# Patient Record
Sex: MALE | Race: WHITE | NOT HISPANIC OR LATINO | Employment: OTHER | URBAN - METROPOLITAN AREA
[De-identification: names, ages, dates, MRNs, and addresses within clinical notes are randomized per-mention and may not be internally consistent; named-entity substitution may affect disease eponyms.]

---

## 2022-05-30 ENCOUNTER — ANESTHESIA EVENT (OUTPATIENT)
Dept: ENDOSCOPY | Facility: HOSPITAL | Age: 87
DRG: 378 | End: 2022-05-30
Payer: MEDICARE

## 2022-05-30 ENCOUNTER — HOSPITAL ENCOUNTER (EMERGENCY)
Facility: HOSPITAL | Age: 87
Discharge: ANOTHER HOSPITAL | End: 2022-05-30
Attending: STUDENT IN AN ORGANIZED HEALTH CARE EDUCATION/TRAINING PROGRAM
Payer: MEDICARE

## 2022-05-30 ENCOUNTER — HOSPITAL ENCOUNTER (INPATIENT)
Facility: HOSPITAL | Age: 87
LOS: 4 days | Discharge: HOME HEALTH | DRG: 378 | End: 2022-06-03
Attending: INTERNAL MEDICINE | Admitting: INTERNAL MEDICINE
Payer: MEDICARE

## 2022-05-30 VITALS
WEIGHT: 190 LBS | HEIGHT: 70 IN | RESPIRATION RATE: 20 BRPM | HEART RATE: 85 BPM | OXYGEN SATURATION: 99 % | SYSTOLIC BLOOD PRESSURE: 118 MMHG | DIASTOLIC BLOOD PRESSURE: 71 MMHG | BODY MASS INDEX: 27.2 KG/M2 | TEMPERATURE: 98 F

## 2022-05-30 DIAGNOSIS — K92.2 UGIB (UPPER GASTROINTESTINAL BLEED): Primary | ICD-10-CM

## 2022-05-30 DIAGNOSIS — R00.0 TACHYCARDIA, UNSPECIFIED: ICD-10-CM

## 2022-05-30 DIAGNOSIS — D62 ACUTE BLOOD LOSS ANEMIA: ICD-10-CM

## 2022-05-30 DIAGNOSIS — K92.2 ACUTE GI BLEEDING: Primary | ICD-10-CM

## 2022-05-30 DIAGNOSIS — R55 SYNCOPE: ICD-10-CM

## 2022-05-30 DIAGNOSIS — K92.2 GI BLEED: ICD-10-CM

## 2022-05-30 DIAGNOSIS — R11.10 VOMITING: ICD-10-CM

## 2022-05-30 DIAGNOSIS — R00.0 TACHYCARDIA: ICD-10-CM

## 2022-05-30 PROBLEM — I10 HTN (HYPERTENSION), BENIGN: Status: ACTIVE | Noted: 2022-05-30

## 2022-05-30 LAB
ABO + RH BLD: NORMAL
ABO + RH BLD: NORMAL
ALBUMIN SERPL BCP-MCNC: 2.4 G/DL (ref 3.5–5.2)
ALP SERPL-CCNC: 84 U/L (ref 55–135)
ALT SERPL W/O P-5'-P-CCNC: 13 U/L (ref 10–44)
ANION GAP SERPL CALC-SCNC: 12 MMOL/L (ref 8–16)
APTT BLDCRRT: <21 SEC (ref 21–32)
AST SERPL-CCNC: 11 U/L (ref 10–40)
BASOPHILS # BLD AUTO: 0.05 K/UL (ref 0–0.2)
BASOPHILS # BLD AUTO: 0.06 K/UL (ref 0–0.2)
BASOPHILS NFR BLD: 0.5 % (ref 0–1.9)
BASOPHILS NFR BLD: 0.5 % (ref 0–1.9)
BILIRUB SERPL-MCNC: 0.9 MG/DL (ref 0.1–1)
BLD GP AB SCN CELLS X3 SERPL QL: NORMAL
BLD GP AB SCN CELLS X3 SERPL QL: NORMAL
BLD PROD TYP BPU: NORMAL
BLD PROD TYP BPU: NORMAL
BLOOD UNIT EXPIRATION DATE: NORMAL
BLOOD UNIT EXPIRATION DATE: NORMAL
BLOOD UNIT TYPE CODE: 6200
BLOOD UNIT TYPE CODE: 6200
BLOOD UNIT TYPE: NORMAL
BLOOD UNIT TYPE: NORMAL
BUN SERPL-MCNC: 42 MG/DL (ref 8–23)
CALCIUM SERPL-MCNC: 7.3 MG/DL (ref 8.7–10.5)
CHLORIDE SERPL-SCNC: 111 MMOL/L (ref 95–110)
CO2 SERPL-SCNC: 20 MMOL/L (ref 23–29)
CODING SYSTEM: NORMAL
CODING SYSTEM: NORMAL
CREAT SERPL-MCNC: 1.3 MG/DL (ref 0.5–1.4)
CTP QC/QA: YES
DIFFERENTIAL METHOD: ABNORMAL
DIFFERENTIAL METHOD: ABNORMAL
DISPENSE STATUS: NORMAL
DISPENSE STATUS: NORMAL
EOSINOPHIL # BLD AUTO: 0 K/UL (ref 0–0.5)
EOSINOPHIL # BLD AUTO: 0.1 K/UL (ref 0–0.5)
EOSINOPHIL NFR BLD: 0.4 % (ref 0–8)
EOSINOPHIL NFR BLD: 0.5 % (ref 0–8)
ERYTHROCYTE [DISTWIDTH] IN BLOOD BY AUTOMATED COUNT: 17.7 % (ref 11.5–14.5)
ERYTHROCYTE [DISTWIDTH] IN BLOOD BY AUTOMATED COUNT: 18.4 % (ref 11.5–14.5)
EST. GFR  (AFRICAN AMERICAN): 57.9 ML/MIN/1.73 M^2
EST. GFR  (NON AFRICAN AMERICAN): 50.1 ML/MIN/1.73 M^2
GLUCOSE SERPL-MCNC: 179 MG/DL (ref 70–110)
HCT VFR BLD AUTO: 23.2 % (ref 40–54)
HCT VFR BLD AUTO: 29.6 % (ref 40–54)
HGB BLD-MCNC: 6.4 G/DL (ref 14–18)
HGB BLD-MCNC: 8.8 G/DL (ref 14–18)
IMM GRANULOCYTES # BLD AUTO: 0.04 K/UL (ref 0–0.04)
IMM GRANULOCYTES # BLD AUTO: 0.05 K/UL (ref 0–0.04)
IMM GRANULOCYTES NFR BLD AUTO: 0.4 % (ref 0–0.5)
IMM GRANULOCYTES NFR BLD AUTO: 0.4 % (ref 0–0.5)
INR PPP: 1.1 (ref 0.8–1.2)
LYMPHOCYTES # BLD AUTO: 0.8 K/UL (ref 1–4.8)
LYMPHOCYTES # BLD AUTO: 1.2 K/UL (ref 1–4.8)
LYMPHOCYTES NFR BLD: 10.3 % (ref 18–48)
LYMPHOCYTES NFR BLD: 9 % (ref 18–48)
MCH RBC QN AUTO: 21.3 PG (ref 27–31)
MCH RBC QN AUTO: 23.8 PG (ref 27–31)
MCHC RBC AUTO-ENTMCNC: 27.6 G/DL (ref 32–36)
MCHC RBC AUTO-ENTMCNC: 29.7 G/DL (ref 32–36)
MCV RBC AUTO: 77 FL (ref 82–98)
MCV RBC AUTO: 80 FL (ref 82–98)
MONOCYTES # BLD AUTO: 0.3 K/UL (ref 0.3–1)
MONOCYTES # BLD AUTO: 0.6 K/UL (ref 0.3–1)
MONOCYTES NFR BLD: 3.3 % (ref 4–15)
MONOCYTES NFR BLD: 4.9 % (ref 4–15)
NEUTROPHILS # BLD AUTO: 7.9 K/UL (ref 1.8–7.7)
NEUTROPHILS # BLD AUTO: 9.5 K/UL (ref 1.8–7.7)
NEUTROPHILS NFR BLD: 83.4 % (ref 38–73)
NEUTROPHILS NFR BLD: 86.4 % (ref 38–73)
NRBC BLD-RTO: 0 /100 WBC
NRBC BLD-RTO: 0 /100 WBC
NT-PROBNP SERPL-MCNC: 106 PG/ML (ref 5–1800)
NUM UNITS TRANS PACKED RBC: NORMAL
NUM UNITS TRANS PACKED RBC: NORMAL
OB PNL STL: POSITIVE
PLATELET # BLD AUTO: 268 K/UL (ref 150–450)
PLATELET # BLD AUTO: 317 K/UL (ref 150–450)
PMV BLD AUTO: 10.1 FL (ref 9.2–12.9)
PMV BLD AUTO: 9.9 FL (ref 9.2–12.9)
POTASSIUM SERPL-SCNC: 4.5 MMOL/L (ref 3.5–5.1)
PROT SERPL-MCNC: 5.3 G/DL (ref 6–8.4)
PROTHROMBIN TIME: 11.5 SEC (ref 9–12.5)
RBC # BLD AUTO: 3.01 M/UL (ref 4.6–6.2)
RBC # BLD AUTO: 3.69 M/UL (ref 4.6–6.2)
SARS-COV-2 RDRP RESP QL NAA+PROBE: NEGATIVE
SODIUM SERPL-SCNC: 143 MMOL/L (ref 136–145)
TROPONIN I SERPL DL<=0.01 NG/ML-MCNC: 8.6 PG/ML (ref 0–60)
WBC # BLD AUTO: 11.41 K/UL (ref 3.9–12.7)
WBC # BLD AUTO: 9.11 K/UL (ref 3.9–12.7)

## 2022-05-30 PROCEDURE — 99291 CRITICAL CARE FIRST HOUR: CPT | Mod: 25

## 2022-05-30 PROCEDURE — 93010 EKG 12-LEAD: ICD-10-PCS | Mod: ,,, | Performed by: INTERNAL MEDICINE

## 2022-05-30 PROCEDURE — C9113 INJ PANTOPRAZOLE SODIUM, VIA: HCPCS | Performed by: INTERNAL MEDICINE

## 2022-05-30 PROCEDURE — 96374 THER/PROPH/DIAG INJ IV PUSH: CPT

## 2022-05-30 PROCEDURE — 93005 ELECTROCARDIOGRAM TRACING: CPT

## 2022-05-30 PROCEDURE — 99222 1ST HOSP IP/OBS MODERATE 55: CPT | Mod: ,,, | Performed by: INTERNAL MEDICINE

## 2022-05-30 PROCEDURE — 93010 ELECTROCARDIOGRAM REPORT: CPT | Mod: ,,, | Performed by: INTERNAL MEDICINE

## 2022-05-30 PROCEDURE — 86901 BLOOD TYPING SEROLOGIC RH(D): CPT | Performed by: STUDENT IN AN ORGANIZED HEALTH CARE EDUCATION/TRAINING PROGRAM

## 2022-05-30 PROCEDURE — 86850 RBC ANTIBODY SCREEN: CPT | Mod: 91 | Performed by: INTERNAL MEDICINE

## 2022-05-30 PROCEDURE — 85730 THROMBOPLASTIN TIME PARTIAL: CPT | Performed by: STUDENT IN AN ORGANIZED HEALTH CARE EDUCATION/TRAINING PROGRAM

## 2022-05-30 PROCEDURE — P9016 RBC LEUKOCYTES REDUCED: HCPCS | Performed by: STUDENT IN AN ORGANIZED HEALTH CARE EDUCATION/TRAINING PROGRAM

## 2022-05-30 PROCEDURE — 85025 COMPLETE CBC W/AUTO DIFF WBC: CPT | Performed by: STUDENT IN AN ORGANIZED HEALTH CARE EDUCATION/TRAINING PROGRAM

## 2022-05-30 PROCEDURE — 83880 ASSAY OF NATRIURETIC PEPTIDE: CPT | Performed by: STUDENT IN AN ORGANIZED HEALTH CARE EDUCATION/TRAINING PROGRAM

## 2022-05-30 PROCEDURE — C9113 INJ PANTOPRAZOLE SODIUM, VIA: HCPCS | Performed by: STUDENT IN AN ORGANIZED HEALTH CARE EDUCATION/TRAINING PROGRAM

## 2022-05-30 PROCEDURE — 63600175 PHARM REV CODE 636 W HCPCS: Performed by: STUDENT IN AN ORGANIZED HEALTH CARE EDUCATION/TRAINING PROGRAM

## 2022-05-30 PROCEDURE — U0002 COVID-19 LAB TEST NON-CDC: HCPCS | Performed by: STUDENT IN AN ORGANIZED HEALTH CARE EDUCATION/TRAINING PROGRAM

## 2022-05-30 PROCEDURE — 99222 PR INITIAL HOSPITAL CARE,LEVL II: ICD-10-PCS | Mod: ,,, | Performed by: INTERNAL MEDICINE

## 2022-05-30 PROCEDURE — 96361 HYDRATE IV INFUSION ADD-ON: CPT

## 2022-05-30 PROCEDURE — 82272 OCCULT BLD FECES 1-3 TESTS: CPT | Performed by: STUDENT IN AN ORGANIZED HEALTH CARE EDUCATION/TRAINING PROGRAM

## 2022-05-30 PROCEDURE — 80053 COMPREHEN METABOLIC PANEL: CPT | Performed by: STUDENT IN AN ORGANIZED HEALTH CARE EDUCATION/TRAINING PROGRAM

## 2022-05-30 PROCEDURE — 25000003 PHARM REV CODE 250: Performed by: INTERNAL MEDICINE

## 2022-05-30 PROCEDURE — 86920 COMPATIBILITY TEST SPIN: CPT | Performed by: INTERNAL MEDICINE

## 2022-05-30 PROCEDURE — 85610 PROTHROMBIN TIME: CPT | Performed by: STUDENT IN AN ORGANIZED HEALTH CARE EDUCATION/TRAINING PROGRAM

## 2022-05-30 PROCEDURE — 36415 COLL VENOUS BLD VENIPUNCTURE: CPT | Performed by: INTERNAL MEDICINE

## 2022-05-30 PROCEDURE — 85025 COMPLETE CBC W/AUTO DIFF WBC: CPT | Mod: 91 | Performed by: INTERNAL MEDICINE

## 2022-05-30 PROCEDURE — 84484 ASSAY OF TROPONIN QUANT: CPT | Performed by: STUDENT IN AN ORGANIZED HEALTH CARE EDUCATION/TRAINING PROGRAM

## 2022-05-30 PROCEDURE — 86920 COMPATIBILITY TEST SPIN: CPT | Performed by: STUDENT IN AN ORGANIZED HEALTH CARE EDUCATION/TRAINING PROGRAM

## 2022-05-30 PROCEDURE — 21400001 HC TELEMETRY ROOM

## 2022-05-30 PROCEDURE — 63600175 PHARM REV CODE 636 W HCPCS: Performed by: INTERNAL MEDICINE

## 2022-05-30 PROCEDURE — 36430 TRANSFUSION BLD/BLD COMPNT: CPT

## 2022-05-30 RX ORDER — PANTOPRAZOLE SODIUM 40 MG/10ML
80 INJECTION, POWDER, LYOPHILIZED, FOR SOLUTION INTRAVENOUS
Status: COMPLETED | OUTPATIENT
Start: 2022-05-30 | End: 2022-05-30

## 2022-05-30 RX ORDER — PANTOPRAZOLE SODIUM 40 MG/10ML
40 INJECTION, POWDER, LYOPHILIZED, FOR SOLUTION INTRAVENOUS 2 TIMES DAILY
Status: DISCONTINUED | OUTPATIENT
Start: 2022-05-30 | End: 2022-05-30

## 2022-05-30 RX ORDER — HYDROCODONE BITARTRATE AND ACETAMINOPHEN 500; 5 MG/1; MG/1
TABLET ORAL
Status: DISCONTINUED | OUTPATIENT
Start: 2022-05-30 | End: 2022-05-30 | Stop reason: HOSPADM

## 2022-05-30 RX ORDER — LORAZEPAM 2 MG/ML
1 INJECTION INTRAMUSCULAR ONCE
Status: COMPLETED | OUTPATIENT
Start: 2022-05-31 | End: 2022-05-30

## 2022-05-30 RX ORDER — SODIUM CHLORIDE 9 MG/ML
INJECTION, SOLUTION INTRAVENOUS CONTINUOUS
Status: DISCONTINUED | OUTPATIENT
Start: 2022-05-30 | End: 2022-06-01

## 2022-05-30 RX ADMIN — LORAZEPAM 1 MG: 2 INJECTION INTRAMUSCULAR; INTRAVENOUS at 11:05

## 2022-05-30 RX ADMIN — PANTOPRAZOLE SODIUM 80 MG: 40 INJECTION, POWDER, FOR SOLUTION INTRAVENOUS at 06:05

## 2022-05-30 RX ADMIN — SODIUM CHLORIDE, SODIUM LACTATE, POTASSIUM CHLORIDE, AND CALCIUM CHLORIDE 500 ML: .6; .31; .03; .02 INJECTION, SOLUTION INTRAVENOUS at 06:05

## 2022-05-30 RX ADMIN — PANTOPRAZOLE SODIUM 8 MG/HR: 40 INJECTION, POWDER, FOR SOLUTION INTRAVENOUS at 08:05

## 2022-05-30 RX ADMIN — SODIUM CHLORIDE: 0.9 INJECTION, SOLUTION INTRAVENOUS at 07:05

## 2022-05-30 RX ADMIN — PANTOPRAZOLE SODIUM 8 MG/HR: 40 INJECTION, POWDER, FOR SOLUTION INTRAVENOUS at 11:05

## 2022-05-30 NOTE — HPI
Mr. Duran is a 85 yo M who presents with a CC of vomiting black material for 2 days.  Patient presented to an ED in [place]- and was transferred here for evaluation of a likely upper GI bleed.  Patient found to have a Hgb of 6.4.  Patient takes Alkaseltser several times a week. No abdominal pain. No history of GI bleed.  Patient received 2 units of blood. Otherwise- he has no complaints. He is on no medications.  Only med issue is hypertension- but he states he does not take any meds for this. He is non-ill appearing and joking around     - David Masterson MD

## 2022-05-30 NOTE — ED NOTES
Transfer Center reports [facility] Room #334. Number for report is [phone]. Accepting MD is Dr. Masterson. Transfer center reports setting up [facility] transportation, routine. Primary Nurse, Uyen notified.

## 2022-05-30 NOTE — ASSESSMENT & PLAN NOTE
Likely upper gi bleed from NSAID use. GI consulted. Protonix infusion. S/p 2 units of blood. GI pathway initiated - CBC every 8 hours

## 2022-05-30 NOTE — NURSING
Received patient from the ED awake, alert and oriented. Respirations even non-labored. Telemetry monitoring placed on pateint. H/l to the rt wrist d/c'd it is bleeding. He still has a #18 to the rt ac. Abdomen rounded he denies any pain. States he was throwing up blood for 2 days but doesn't remember blood in his stool. Luke. Pt being held NPO until seen by GI

## 2022-05-30 NOTE — CONSULTS
[facility] [place]  Gastroenterology  Consult Note    Patient Name: Rayo Duran Sr.  MRN: [id number]  Admission Date: 5/30/2022  Hospital Length of Stay: 0 days  Code Status: Full Code   Attending Provider: Dr Masterson  Consulting Provider: Mario Escalera MD  Primary Care Physician: Caroline Florez MD  Principal Problem:Acute blood loss anemia    Inpatient consult to Gastroenterology  Consult performed by: Mario Escalera MD  Consult ordered by: David Masterson MD  Reason for consult: Upper GI bleeding  Assessment/Recommendations: 86-year-old gentleman with significant anemia secondary to upper GI bleeding that as hematemesis and melena.  Likely secondary to peptic ulcer disease.  Hemodynamically stable this evening after 2 units of PRBCs.  Recommendation.  Plan EGD in the morning        Subjective:     HPI:  86-year-old gentleman who is generally quite healthy and says he avoids the doctor in general.  History obtained from the patient and I also had a conference with the referring physician.  According to the patient he 1st vomited blood about 7 days ago, that was a small amount of blood.  And then to and 3 days ago he vomited a larger amount of coffee-ground dark material.  Yesterday he may have passed a dark red to black stool.  And yesterday's when he started feeling weak and lightheaded.  He has never had ends of GI bleeding before.  He told the referring doctor that he took aspirin several times a week but he denies to me ever taking any aspirin.  He further denies any NSAID intake and denies any alcohol intake.    He denies any significant abdominal pain but he does say that frequently does take Tracie-Donnelly because of vague indigestion after large meals.    He admits to a remote cholecystectomy when I pointed to a large midline incision in his upper abdomen he commented that that was due to a complicated kidney stone removal.  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  He specifically denied that to me.    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 No pertinent surgical history.    Review of patient's allergies indicates:  No Known Allergies  Family History    None       Tobacco Use    Smoking status: Not on file    Smokeless tobacco: Not on file   Substance and Sexual Activity    Alcohol use: Not on file    Drug use: Not on file    Sexual activity: Not on file     Review of Systems   Constitutional: Positive for fatigue. Negative for activity change, appetite change, chills, diaphoresis, fever and unexpected weight change.   HENT: Negative for congestion, ear pain, mouth sores, rhinorrhea, sinus pressure, sneezing, sore throat, trouble swallowing and voice change.    Eyes: Negative for pain.   Respiratory: Positive for shortness of breath. Negative for cough.    Cardiovascular: Negative for chest pain, palpitations and leg swelling.   Genitourinary: Negative for difficulty urinating and flank pain.   Musculoskeletal: Negative for arthralgias, back pain, gait problem, joint swelling, myalgias and neck pain.   Skin: Negative for rash.   Neurological: Positive for syncope. Negative for dizziness, tremors, numbness and headaches.   Hematological: Negative for adenopathy. Does not bruise/bleed easily.   Psychiatric/Behavioral: Negative for agitation, behavioral problems, confusion, decreased concentration and dysphoric mood.     Objective:     Vital Signs (Most Recent):  Temp: 99.3 °F (37.4 °C) (05/30/22 1700)  Pulse: 97 (05/30/22 1700)  Resp: 20 (05/30/22 1700)  BP: (!) 143/85 (05/30/22 1700)  SpO2: 100 % (05/30/22 1700) Vital Signs (24h Range):  Temp:  [97.7 °F (36.5 °C)-99.3 °F (37.4 °C)] 99.3 °F (37.4 °C)  Pulse:  [] 97  Resp:  [18-22] 20  SpO2:  [95 %-100 %] 100 %  BP: ()/(53-85) 143/85         There is no height or weight on file to calculate BMI.      Intake/Output Summary (Last 24 hours) at 5/30/2022 1746  Last data filed at 5/30/2022 1600  Gross per 24 hour   Intake --   Output 250 ml   Net -250 ml       Lines/Drains/Airways     Peripheral Intravenous Line  Duration                Peripheral IV - Single Lumen 05/30/22 0614 18 G Right Antecubital <1 day                Physical Exam  Constitutional:       General: He is not in acute distress.     Appearance: Normal appearance. He is well-developed. He is not diaphoretic.   HENT:      Right Ear: External ear normal.      Left Ear: External ear normal.      Nose: Nose normal.      Mouth/Throat:      Pharynx: No oropharyngeal exudate.   Eyes:      General: No scleral icterus.     Conjunctiva/sclera: Conjunctivae normal.      Pupils: Pupils are equal, round, and reactive to light.   Neck:      Thyroid: No thyromegaly.   Cardiovascular:      Rate and Rhythm: Normal rate.      Heart sounds: Normal heart sounds. No murmur heard.    No gallop.   Pulmonary:      Effort: Pulmonary effort is normal.      Breath sounds: Normal breath sounds. No wheezing.   Abdominal:      General: Bowel sounds are normal. There is no distension.      Palpations: Abdomen is soft. Abdomen is not rigid. There is no fluid wave or mass.      Tenderness: There is no abdominal tenderness. There is no guarding or rebound.   Genitourinary:     Comments: recent  Musculoskeletal:         General: No tenderness. Normal range of motion.      Cervical back: Normal range of motion and neck supple.   Lymphadenopathy:      Cervical: No cervical adenopathy.   Skin:     General: Skin is warm.      Findings: No rash.   Neurological:      Mental Status: He is alert and oriented to person, place, and time.      Cranial Nerves: No cranial nerve deficit.      Deep Tendon Reflexes: Reflexes are normal and symmetric.   Psychiatric:         Behavior: Behavior normal.         Thought Content: Thought  content normal.         Significant Labs:  Blood Culture: No results for input(s): LABBLOO in the last 48 hours.  CBC:   Recent Labs   Lab 05/30/22  0609   WBC 9.11   HGB 6.4*   HCT 23.2*        CMP:   Recent Labs   Lab 05/30/22  0609   *   CALCIUM 7.3*   ALBUMIN 2.4*   PROT 5.3*      K 4.5   CO2 20*   *   BUN 42*   CREATININE 1.3   ALKPHOS 84   ALT 13   AST 11   BILITOT 0.9       Significant Imaging:      Assessment/Plan:     Active Diagnoses:    Diagnosis Date Noted POA    PRINCIPAL PROBLEM:  Acute blood loss anemia [D62] 05/30/2022 Yes    HTN (hypertension), benign [I10] 05/30/2022 Yes      Problems Resolved During this Admission:       Assessment.  1. 86-year-old gentleman with no significant past medical history who presents with an upper GI bleed and significant subsequent anemia.  He takes Tracie-Whitney for indigestion and this may well be an aspirin induced ulcer of the stomach.  There are a couple things that do concern me.  The issue of whether he has had a possible stomach surgery in the past and also his low albumin now may reflect an underlying chronic process that we are not aware of.  He has received 2 units of blood and is hemodynamically stable after presenting with hypotension and tachycardia.  Follow-up hemoglobin has been ordered and he may well require another unit of blood.  I do not think he is actively bleeding at this time.  He has been started on a PPI IV.  Should be kept NPO after midnight and we will plan on doing upper endoscopy in the morning.  Assuming he has appropriately received any additional transfusion.        Thank you for your consult. I will follow-up with patient. Please contact us if you have any additional questions.    Mario Escalera MD  Gastroenterology  [facility]

## 2022-05-30 NOTE — ED PROVIDER NOTES
Encounter Date: 5/30/2022       History     Chief Complaint   Patient presents with    Loss of Consciousness     Pt family called 911 this morning for a syncopal episode while going to the bathroom. Pt did not fall or hit the floor. Pt was caught by family. Pt was vomiting dark blood last night and has reported dark tarry stools.      86-year-old male with no significant past medical history presents with multiple episodes of black emesis and black tarry stool starting yesterday.  Family got concerned because patient had a syncopal earlier this morning but did not fall and hit the ground.  Family was able to catch patient.  Patient has never had similar episode before does admit to taking aspirin twice a week.  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 No pertinent surgical history.  History reviewed. No pertinent family history.     Review of Systems   Constitutional: Positive for fatigue.   HENT: Negative.    Respiratory: Negative.    Cardiovascular: Negative.    Gastrointestinal: Positive for blood in stool, nausea and vomiting.   Genitourinary: Negative.    Musculoskeletal: Negative.    Skin: Negative.    Neurological: Positive for syncope and weakness.   Hematological: Bruises/bleeds easily.   Psychiatric/Behavioral: Negative.    All other systems reviewed and are negative.      Physical Exam     Initial Vitals [05/30/22 0558]   BP Pulse Resp Temp SpO2   (!) 97/56 92 (!) 22 97.7 °F (36.5 °C) 97 %      MAP       --         Physical Exam    Nursing note and vitals reviewed.  HENT:   Head: Normocephalic and atraumatic.   Dry vomitus in mouth   Eyes: Conjunctivae are normal.   pallor   Neck:   Normal range of motion.  Cardiovascular: Normal rate,  regular rhythm, normal heart sounds and intact distal pulses.   Pulmonary/Chest: Breath sounds normal. No respiratory distress. He has no wheezes.   Abdominal: Abdomen is soft. Bowel sounds are normal. He exhibits no distension. There is no abdominal tenderness. There is no rebound.   Musculoskeletal:         General: Normal range of motion.      Cervical back: Normal range of motion.     Neurological: GCS score is 15. GCS eye subscore is 4. GCS verbal subscore is 5. GCS motor subscore is 6.   Skin: Capillary refill takes 2 to 3 seconds.         ED Course   Critical Care    Date/Time: 5/30/2022 6:51 AM  Performed by: Sim Bauer MD  Authorized by: Sim Bauer MD   Direct patient critical care time: 10 minutes  Additional history critical care time: 5 minutes  Ordering / reviewing critical care time: 5 minutes  Documentation critical care time: 5 minutes  Consulting other physicians critical care time: 5 minutes  Other critical care time: 10 minutes  Total critical care time (exclusive of procedural time) : 40 minutes  Critical care time was exclusive of separately billable procedures and treating other patients.  Critical care was necessary to treat or prevent imminent or life-threatening deterioration of the following conditions: cardiac failure and respiratory failure.  Critical care was time spent personally by me on the following activities: examination of patient, evaluation of patient's response to treatment, obtaining history from patient or surrogate, ordering and performing treatments and interventions, ordering and review of radiographic studies, ordering and review of laboratory studies, pulse oximetry and re-evaluation of patient's condition.        Labs Reviewed   CBC W/ AUTO DIFFERENTIAL - Abnormal; Notable for the following components:       Result Value    RBC 3.01 (*)     Hemoglobin 6.4 (*)     Hematocrit 23.2 (*)     MCV 77 (*)     MCH 21.3 (*)     MCHC 27.6 (*)     RDW 18.4 (*)     Gran #  (ANC) 7.9 (*)     Lymph # 0.8 (*)     Gran % 86.4 (*)     Lymph % 9.0 (*)     Mono % 3.3 (*)     All other components within normal limits   COMPREHENSIVE METABOLIC PANEL - Abnormal; Notable for the following components:    Chloride 111 (*)     CO2 20 (*)     Glucose 179 (*)     BUN 42 (*)     Calcium 7.3 (*)     Total Protein 5.3 (*)     Albumin 2.4 (*)     eGFR if  57.9 (*)     eGFR if non  50.1 (*)     All other components within normal limits   OCCULT BLOOD X 1, STOOL - Abnormal; Notable for the following components:    Occult Blood Positive (*)     All other components within normal limits   PROTIME-INR   APTT   TROPONIN I HIGH SENSITIVITY   NT-PRO NATRIURETIC PEPTIDE   SARS-COV-2 RDRP GENE   TYPE & SCREEN   PREPARE RBC SOFT     EKG Readings: (Independently Interpreted)   Initial Reading: No STEMI. Rhythm: Normal Sinus Rhythm. Conduction: Normal.     ECG Results          EKG 12-lead (Final result)  Result time 05/30/22 10:55:54    Final result by Interface, Lab In [facility] (05/30/22 10:55:54)                 Narrative:    Test Reason : R55,    Vent. Rate : 098 BPM     Atrial Rate : 098 BPM     P-R Int : 130 ms          QRS Dur : 080 ms      QT Int : 350 ms       P-R-T Axes : 006 061 079 degrees     QTc Int : 446 ms    Normal sinus rhythm  Normal ECG  No previous ECGs available  Confirmed by CONSTANTINE CAPPS MD ([id number]) on 5/30/2022 10:55:38 AM    Referred By: [email or web address]   SELF           Confirmed By:CONSTANTINE CAPPS MD                            Imaging Results          X-Ray Chest 1 View (Final result)  Result time 05/30/22 08:28:54    Final result by Marian Godinez MD (05/30/22 08:28:54)                 Impression:      No acute findings      Electronically signed by: Marian Godinez MD  Date:    05/30/2022  Time:    08:28             Narrative:    EXAMINATION:  XR CHEST 1 VIEW    CLINICAL HISTORY:  Vomiting, unspecified    COMPARISON:  06/01/2017    FINDINGS:  Clear lungs.  No  signs of CHF.  Unremarkable cardiomediastinal silhouette.  Suspected COPD.  No significant change since the prior exam                                 Medications   pantoprazole injection 80 mg (80 mg Intravenous Given 5/30/22 0605)   lactated ringers bolus 500 mL (0 mLs Intravenous Stopped 5/30/22 0708)     Medical Decision Making:   Initial Assessment:   86-year-old male with no significant past medical history presents with multiple episodes of black emesis and black tarry stool starting yesterday.  Afebrile vitals stable.  Patient otherwise appear well.  Not on any medication.  Will get labs and imaging to rule out demand ischemia, need for transfusion, arrhythmia.  Protonix.  Plan to transfer patient for GI evaluation.  Clinical Tests:   Lab Tests: Ordered and Reviewed  The following lab test(s) were unremarkable: CBC, CMP, Troponin, BNP, PT and PTT  Radiological Study: Reviewed and Ordered  Medical Tests: Ordered and Reviewed             ED Course as of 05/30/22 2777   Mon May 30, 2022   0649 Patient continues to mentate well.  Will give another 500 cc of fluid.  Continue to closely monitor.  Patient has not had and active emesis or bowel movement.  Will give blood.  Planning to transfer for definitive treatment [HD]      ED Course User Index  [HD] Sim Bauer MD             Clinical Impression:   Final diagnoses:  [R55] Syncope  [R11.10] Vomiting  [K92.2] UGIB (upper gastrointestinal bleed) (Primary)          ED Disposition Condition    Transfer to Another Facility Stable              Sim Bauer MD  05/30/22 3345

## 2022-05-30 NOTE — ANESTHESIA PREPROCEDURE EVALUATION
05/31/2022  Rayo Duran Sr. is a 86 y.o., male transferred for vomiting black material for past two days.  On admission pt was found to have hgb of 6.4.    Pre-op Assessment    I have reviewed the Patient Summary Reports.     I have reviewed the Nursing Notes.       Review of Systems  Anesthesia Hx:  No problems with previous Anesthesia  Denies Family Hx of Anesthesia complications.   Denies Personal Hx of Anesthesia complications.   Social:  No Alcohol Use    Hematology/Oncology:         -- Anemia: Hematology Comments: No reactions to blood transfusion    Hgb ~8 after transfusion  --  Cancer in past history:    Cardiovascular:   Hypertension    Pulmonary:  Pulmonary Normal    Hepatic/GI:   PUD, Acute GI bleed   Neurological:   Acute delirium; not following commands; in restraints and on Precedex drip    CT head with no acute process   Endocrine:  Endocrine Normal    Psych:   Psychiatric History          Physical Exam  General: Frail; does not follow commands; does wince to pain  Dental:  Edentulous    Chest/Lungs:  Tachypnea    Heart:  Rate: Normal       Lab Results   Component Value Date    WBC 14.55 (H) 05/31/2022    HGB 6.4 (L) 05/31/2022    HCT 21.9 (L) 05/31/2022    MCV 80 (L) 05/31/2022     05/31/2022         Chemistry        Component Value Date/Time     05/31/2022 0218    K 4.9 05/31/2022 0218     (H) 05/31/2022 0218    CO2 18 (L) 05/31/2022 0218    BUN 52 (H) 05/31/2022 0218    CREATININE 1.1 05/31/2022 0218     (H) 05/31/2022 0218        Component Value Date/Time    CALCIUM 7.8 (L) 05/31/2022 0218    ALKPHOS 67 05/31/2022 0218    AST 15 05/31/2022 0218    ALT 9 (L) 05/31/2022 0218    BILITOT 2.2 (H) 05/31/2022 0218    ESTGFRAFRICA >60 05/31/2022 0218    EGFRNONAA >60 05/31/2022 0218                Anesthesia Plan  Type of Anesthesia, risks & benefits  "discussed:    Anesthesia Type: Gen ETT  Intra-op Monitoring Plan: Standard ASA Monitors  Post Op Pain Control Plan: multimodal analgesia and IV/PO Opioids PRN  Induction:  IV  Informed Consent: Informed consent signed with the Patient representative and all parties understand the risks and agree with anesthesia plan.  All questions answered.   ASA Score: 3  Day of Surgery Review of History & Physical: H&P Update referred to the surgeon/provider.  Anesthesia Plan Notes: Patient had acute delirium overnight; currently on Precedex drip and not following commands. H&P obtained from chart review and patient's wife and son. D/w family that patient may be even more confused/delirious after procedure, and they understand. Informed consent obtained from patient's wife, Jania, but she is "almost blind" and cannot see to sign. She elected to have patient's son, Rayo LlamasJr dexter. sign the consent.     Ready For Surgery From Anesthesia Perspective.     .      "

## 2022-05-30 NOTE — ED NOTES
Lab verbalizes 30 more minutes before cross match is ready for blood administration. MD made aware. Pt's vitals stable.

## 2022-05-30 NOTE — SUBJECTIVE & OBJECTIVE
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 No pertinent surgical history.    Review of patient's allergies indicates:  No Known Allergies    Current Facility-Administered Medications on File Prior to Encounter   Medication    [COMPLETED] lactated ringers bolus 500 mL    [COMPLETED] pantoprazole injection 80 mg    [DISCONTINUED] 0.9%  NaCl infusion (for blood administration)     Current Outpatient Medications on File Prior to Encounter   Medication Sig    rivaroxaban (XARELTO) 10 mg Tab Take 1 tablet (10 mg total) by mouth daily with dinner or evening meal. for 14 days     Family History    None       Tobacco Use    Smoking status: Not on file    Smokeless tobacco: Not on file   Substance and Sexual Activity    Alcohol use: Not on file    Drug use: Not on file    Sexual activity: Not on file     Review of Systems   Constitutional:  Negative for activity change, appetite change, diaphoresis, fatigue and fever.   HENT:  Negative for congestion and dental problem.    Eyes:  Negative for photophobia, pain, discharge, redness and itching.   Respiratory:  Negative for cough, choking, chest tightness and shortness of breath.    Cardiovascular:  Negative for chest pain and leg swelling.   Gastrointestinal:  Positive for vomiting. Negative for abdominal pain.   Endocrine: Negative for cold intolerance.   Genitourinary:  Negative for difficulty urinating and dysuria.   Musculoskeletal:  Positive for arthralgias. Negative for back pain.   Neurological:  Negative for dizziness, facial asymmetry and headaches.   Psychiatric/Behavioral:  Negative for agitation and behavioral problems.    Objective:     Vital Signs (Most Recent):    Vital Signs (24h Range):  Temp:  [97.7 °F (36.5 °C)-98.2 °F (36.8 °C)] 98.2 °F (36.8 °C)  Pulse:  [] 85  Resp:  [18-22] 20  SpO2:  [95 %-100 %] 99 %  BP: ()/(53-71) 118/71        There is no height or  weight on file to calculate BMI.    Physical Exam  Vitals and nursing note reviewed.   Constitutional:       General: He is not in acute distress.     Appearance: Normal appearance. He is normal weight. He is not ill-appearing, toxic-appearing or diaphoretic.   HENT:      Head: Normocephalic and atraumatic.   Eyes:      Conjunctiva/sclera: Conjunctivae normal.   Cardiovascular:      Rate and Rhythm: Normal rate and regular rhythm.      Heart sounds: No murmur heard.  Pulmonary:      Effort: Pulmonary effort is normal. No respiratory distress.      Breath sounds: No wheezing or rales.   Abdominal:      General: There is no distension.      Tenderness: There is no abdominal tenderness. There is no guarding.   Skin:     General: Skin is warm and dry.   Neurological:      Mental Status: He is alert and oriented to person, place, and time.   Psychiatric:         Mood and Affect: Mood normal.         Behavior: Behavior normal.         Thought Content: Thought content normal.           Significant Labs: All pertinent labs within the past 24 hours have been reviewed.  BMP:   Recent Labs   Lab 05/30/22  0609   *      K 4.5   *   CO2 20*   BUN 42*   CREATININE 1.3   CALCIUM 7.3*     CBC:   Recent Labs   Lab 05/30/22  0609   WBC 9.11   HGB 6.4*   HCT 23.2*          Significant Imaging:

## 2022-05-30 NOTE — PROVIDER TRANSFER
Outside Transfer Acceptance Note / [facility] Referral Center    Referring facility: [facility]   Referring provider: GENESIS LANG  Accepting facility: OTHER  Accepting provider: YASMANI TODD  Admitting provider: CONSTANTINE DON  Reason for transfer:  Need Gastroenterology  Transfer diagnosis: GI bleed  Transfer specialty requested: Hospital Medicine  Transfer specialty notified: yes  Transfer level: NUMBER 1-5: 2  Bed type requested: telemetry  Isolation status: No active isolations   Admission class or status: IP- Inpatient      Narrative     86-year-old male with history of hypertension (record mentions PUD and prior gastric tumor removal) presented to [facility] on May 30 with multiple episodes of emesis of black material along with black tarry stools that started the day prior to presentation.  Patient had a syncopal episode on the morning of presentation.  Family was able to catch him, and he did not hit the ground.  He has no NSAID use and no anticoagulation use, but he did report taking aspirin twice a week.  No emesis or rectal bleeding in the emergency department so far.  In the emergency department he received volume resuscitation and IV Protonix.  Packed red blood cells are ordered (on his 1st unit currently). He has two IVs in place.  Patient told the ED provider his last endoscopy was over 20 years ago.  Referring provider spoke with Gastroenterology at [facility].  Requesting transfer to Hospital Medicine for further treatment.    COVID negative, NT-proBNP 106, INR 1.1, troponin high sensitivity 8.6 (normal range 0-60), sodium 143, potassium 4.5, chloride 111, CO2 20, BUN 42, creatinine 1.3, glucose 179, AST 11, ALT 13, stool for occult blood positive, white blood cells 9.11, hemoglobin 6.4, hematocrit 23.2, platelets 317    Chest x-ray had no acute findings.    Objective     Vitals: Temp: 98.2 °F (36.8 °C) (05/30/22 1120)  Pulse: 87  (05/30/22 1120)  Resp: 20 (05/30/22 1120)  BP: 124/61 (05/30/22 1120)  SpO2: 100 % (05/30/22 1120)  Recent Labs:   CBC:   Recent Labs   Lab 05/30/22  0609   WBC 9.11   HGB 6.4*   HCT 23.2*        CMP:   Recent Labs   Lab 05/30/22  0609      K 4.5   *   CO2 20*   *   BUN 42*   CREATININE 1.3   CALCIUM 7.3*   PROT 5.3*   ALBUMIN 2.4*   BILITOT 0.9   ALKPHOS 84   AST 11   ALT 13   ANIONGAP 12   EGFRNONAA 50.1*     Recent imaging: see above   IV access:        Peripheral IV - Single Lumen 05/30/22 0545 18 G Anterior;Right Wrist (Active)   Site Assessment Clean;Dry;Intact 05/30/22 0557   Extremity Assessment Distal to IV No redness;No swelling 05/30/22 0557   Line Status Blood return noted 05/30/22 0557   Dressing Status Clean;Dry;Intact 05/30/22 0557   Dressing Intervention First dressing 05/30/22 0557            Peripheral IV - Single Lumen 05/30/22 0614 18 G Right Antecubital (Active)     Allergies: Review of patient's allergies indicates:  No Known Allergies   NPO: No      Anticoagulation:   Anticoagulants     None           Instructions    1. Admit to Hospital Medicine  2. Consult Gastroenterology    Upon patient arrival to floor, please contact Hospital Medicine on call.     RENALDO Murry MD  Hospital Medicine Staff  Cell: [phone]

## 2022-05-30 NOTE — H&P
[facility] Medicine  History & Physical    Patient Name: Rayo Duran Sr.  MRN: [id number]  Patient Class: IP- Inpatient  Admission Date: 5/30/2022  Attending Physician: David Masterson MD   Primary Care Provider: Caroline Florez MD         Patient information was obtained from patient and ER records.     Subjective:     Principal Problem:Acute blood loss anemia    Chief Complaint: No chief complaint on file.       HPI: Mr. Duran is a 87 yo M who presents with a CC of vomiting black material for 2 days.  Patient presented to an ED in [place]- and was transferred here for evaluation of a likely upper GI bleed.  Patient found to have a Hgb of 6.4.  Patient takes Alkaseltser several times a week. No abdominal pain. No history of GI bleed.  Patient received 2 units of blood. Otherwise- he has no complaints. He is on no medications.  Only med issue is hypertension- but he states he does not take any meds for this. 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 No pertinent surgical history.    Review of patient's allergies indicates:  No Known Allergies    Current Facility-Administered Medications on File Prior to Encounter   Medication    [COMPLETED] lactated ringers bolus 500 mL    [COMPLETED] pantoprazole injection 80 mg    [DISCONTINUED] 0.9%  NaCl infusion (for blood administration)     Current Outpatient Medications on File Prior to Encounter   Medication Sig    rivaroxaban (XARELTO) 10 mg Tab Take 1 tablet (10 mg total) by mouth daily with dinner or evening meal. for 14 days     Family History    None       Tobacco Use    Smoking status: Not on file    Smokeless tobacco: Not on file   Substance and Sexual Activity    Alcohol use: Not on file    Drug use: Not on file    Sexual activity: Not on file     Review of Systems   Constitutional:   Negative for activity change, appetite change, diaphoresis, fatigue and fever.   HENT:  Negative for congestion and dental problem.    Eyes:  Negative for photophobia, pain, discharge, redness and itching.   Respiratory:  Negative for cough, choking, chest tightness and shortness of breath.    Cardiovascular:  Negative for chest pain and leg swelling.   Gastrointestinal:  Positive for vomiting. Negative for abdominal pain.   Endocrine: Negative for cold intolerance.   Genitourinary:  Negative for difficulty urinating and dysuria.   Musculoskeletal:  Positive for arthralgias. Negative for back pain.   Neurological:  Negative for dizziness, facial asymmetry and headaches.   Psychiatric/Behavioral:  Negative for agitation and behavioral problems.    Objective:     Vital Signs (Most Recent):    Vital Signs (24h Range):  Temp:  [97.7 °F (36.5 °C)-98.2 °F (36.8 °C)] 98.2 °F (36.8 °C)  Pulse:  [] 85  Resp:  [18-22] 20  SpO2:  [95 %-100 %] 99 %  BP: ()/(53-71) 118/71        There is no height or weight on file to calculate BMI.    Physical Exam  Vitals and nursing note reviewed.   Constitutional:       General: He is not in acute distress.     Appearance: Normal appearance. He is normal weight. He is not ill-appearing, toxic-appearing or diaphoretic.   HENT:      Head: Normocephalic and atraumatic.   Eyes:      Conjunctiva/sclera: Conjunctivae normal.   Cardiovascular:      Rate and Rhythm: Normal rate and regular rhythm.      Heart sounds: No murmur heard.  Pulmonary:      Effort: Pulmonary effort is normal. No respiratory distress.      Breath sounds: No wheezing or rales.   Abdominal:      General: There is no distension.      Tenderness: There is no abdominal tenderness. There is no guarding.   Skin:     General: Skin is warm and dry.   Neurological:      Mental Status: He is alert and oriented to person, place, and time.   Psychiatric:         Mood and Affect: Mood normal.         Behavior: Behavior normal.          Thought Content: Thought content normal.           Significant Labs: All pertinent labs within the past 24 hours have been reviewed.  BMP:   Recent Labs   Lab 05/30/22  0609   *      K 4.5   *   CO2 20*   BUN 42*   CREATININE 1.3   CALCIUM 7.3*     CBC:   Recent Labs   Lab 05/30/22  0609   WBC 9.11   HGB 6.4*   HCT 23.2*          Significant Imaging:     Assessment/Plan:     * Acute blood loss anemia  Likely upper gi bleed from NSAID use. GI consulted. Protonix infusion. S/p 2 units of blood. GI pathway initiated - CBC every 8 hours       HTN (hypertension), benign  Does not take meds         VTE Risk Mitigation (From admission, onward)         Ordered     IP VTE HIGH RISK PATIENT  Once         05/30/22 1701     Place sequential compression device  Until discontinued         05/30/22 1701                   David Hamm MD  Department of Hospital Medicine   [facility] [place] - Telemetry

## 2022-05-31 ENCOUNTER — ANESTHESIA (OUTPATIENT)
Dept: ENDOSCOPY | Facility: HOSPITAL | Age: 87
DRG: 378 | End: 2022-05-31
Payer: MEDICARE

## 2022-05-31 PROBLEM — K92.2 GASTROINTESTINAL BLEED: Status: ACTIVE | Noted: 2022-05-31

## 2022-05-31 PROBLEM — R41.0 DELIRIUM: Status: ACTIVE | Noted: 2022-05-31

## 2022-05-31 PROBLEM — Z51.5 PALLIATIVE CARE ENCOUNTER: Status: ACTIVE | Noted: 2022-05-31

## 2022-05-31 LAB
ALBUMIN SERPL BCP-MCNC: 2.6 G/DL (ref 3.5–5.2)
ALLENS TEST: ABNORMAL
ALP SERPL-CCNC: 67 U/L (ref 55–135)
ALT SERPL W/O P-5'-P-CCNC: 9 U/L (ref 10–44)
AMMONIA PLAS-SCNC: 29 UMOL/L (ref 10–50)
ANION GAP SERPL CALC-SCNC: 7 MMOL/L (ref 8–16)
AST SERPL-CCNC: 15 U/L (ref 10–40)
BASOPHILS # BLD AUTO: 0.04 K/UL (ref 0–0.2)
BASOPHILS # BLD AUTO: 0.05 K/UL (ref 0–0.2)
BASOPHILS # BLD AUTO: 0.06 K/UL (ref 0–0.2)
BASOPHILS NFR BLD: 0.3 % (ref 0–1.9)
BASOPHILS NFR BLD: 0.4 % (ref 0–1.9)
BASOPHILS NFR BLD: 0.4 % (ref 0–1.9)
BILIRUB SERPL-MCNC: 2.2 MG/DL (ref 0.1–1)
BLD PROD TYP BPU: NORMAL
BLD PROD TYP BPU: NORMAL
BLOOD UNIT EXPIRATION DATE: NORMAL
BLOOD UNIT EXPIRATION DATE: NORMAL
BLOOD UNIT TYPE CODE: 6200
BLOOD UNIT TYPE CODE: 6200
BLOOD UNIT TYPE: NORMAL
BLOOD UNIT TYPE: NORMAL
BUN SERPL-MCNC: 52 MG/DL (ref 8–23)
CALCIUM SERPL-MCNC: 7.8 MG/DL (ref 8.7–10.5)
CHLORIDE SERPL-SCNC: 116 MMOL/L (ref 95–110)
CO2 SERPL-SCNC: 18 MMOL/L (ref 23–29)
CODING SYSTEM: NORMAL
CODING SYSTEM: NORMAL
CREAT SERPL-MCNC: 1.1 MG/DL (ref 0.5–1.4)
DIFFERENTIAL METHOD: ABNORMAL
DISPENSE STATUS: NORMAL
DISPENSE STATUS: NORMAL
EOSINOPHIL # BLD AUTO: 0 K/UL (ref 0–0.5)
EOSINOPHIL # BLD AUTO: 0 K/UL (ref 0–0.5)
EOSINOPHIL # BLD AUTO: 0.1 K/UL (ref 0–0.5)
EOSINOPHIL NFR BLD: 0.1 % (ref 0–8)
EOSINOPHIL NFR BLD: 0.3 % (ref 0–8)
EOSINOPHIL NFR BLD: 0.7 % (ref 0–8)
ERYTHROCYTE [DISTWIDTH] IN BLOOD BY AUTOMATED COUNT: 17.2 % (ref 11.5–14.5)
ERYTHROCYTE [DISTWIDTH] IN BLOOD BY AUTOMATED COUNT: 17.9 % (ref 11.5–14.5)
ERYTHROCYTE [DISTWIDTH] IN BLOOD BY AUTOMATED COUNT: 18.3 % (ref 11.5–14.5)
EST. GFR  (AFRICAN AMERICAN): >60 ML/MIN/1.73 M^2
EST. GFR  (NON AFRICAN AMERICAN): >60 ML/MIN/1.73 M^2
GLUCOSE SERPL-MCNC: 122 MG/DL (ref 70–110)
HCO3 UR-SCNC: 17.3 MMOL/L (ref 24–28)
HCT VFR BLD AUTO: 21.9 % (ref 40–54)
HCT VFR BLD AUTO: 23.2 % (ref 40–54)
HCT VFR BLD AUTO: 25.2 % (ref 40–54)
HGB BLD-MCNC: 6.4 G/DL (ref 14–18)
HGB BLD-MCNC: 6.9 G/DL (ref 14–18)
HGB BLD-MCNC: 7.8 G/DL (ref 14–18)
IMM GRANULOCYTES # BLD AUTO: 0.05 K/UL (ref 0–0.04)
IMM GRANULOCYTES # BLD AUTO: 0.1 K/UL (ref 0–0.04)
IMM GRANULOCYTES # BLD AUTO: 0.1 K/UL (ref 0–0.04)
IMM GRANULOCYTES NFR BLD AUTO: 0.5 % (ref 0–0.5)
IMM GRANULOCYTES NFR BLD AUTO: 0.7 % (ref 0–0.5)
IMM GRANULOCYTES NFR BLD AUTO: 0.7 % (ref 0–0.5)
LYMPHOCYTES # BLD AUTO: 0.6 K/UL (ref 1–4.8)
LYMPHOCYTES # BLD AUTO: 1.3 K/UL (ref 1–4.8)
LYMPHOCYTES # BLD AUTO: 1.3 K/UL (ref 1–4.8)
LYMPHOCYTES NFR BLD: 12.4 % (ref 18–48)
LYMPHOCYTES NFR BLD: 4.3 % (ref 18–48)
LYMPHOCYTES NFR BLD: 9 % (ref 18–48)
MCH RBC QN AUTO: 23.4 PG (ref 27–31)
MCH RBC QN AUTO: 23.9 PG (ref 27–31)
MCH RBC QN AUTO: 25.7 PG (ref 27–31)
MCHC RBC AUTO-ENTMCNC: 29.2 G/DL (ref 32–36)
MCHC RBC AUTO-ENTMCNC: 29.7 G/DL (ref 32–36)
MCHC RBC AUTO-ENTMCNC: 31 G/DL (ref 32–36)
MCV RBC AUTO: 80 FL (ref 82–98)
MCV RBC AUTO: 80 FL (ref 82–98)
MCV RBC AUTO: 83 FL (ref 82–98)
MONOCYTES # BLD AUTO: 0.7 K/UL (ref 0.3–1)
MONOCYTES # BLD AUTO: 0.8 K/UL (ref 0.3–1)
MONOCYTES # BLD AUTO: 0.9 K/UL (ref 0.3–1)
MONOCYTES NFR BLD: 4.7 % (ref 4–15)
MONOCYTES NFR BLD: 6.2 % (ref 4–15)
MONOCYTES NFR BLD: 7.9 % (ref 4–15)
NEUTROPHILS # BLD AUTO: 12.3 K/UL (ref 1.8–7.7)
NEUTROPHILS # BLD AUTO: 13.1 K/UL (ref 1.8–7.7)
NEUTROPHILS # BLD AUTO: 8.1 K/UL (ref 1.8–7.7)
NEUTROPHILS NFR BLD: 78.1 % (ref 38–73)
NEUTROPHILS NFR BLD: 83.4 % (ref 38–73)
NEUTROPHILS NFR BLD: 89.9 % (ref 38–73)
NRBC BLD-RTO: 0 /100 WBC
NUM UNITS TRANS PACKED RBC: NORMAL
NUM UNITS TRANS PACKED RBC: NORMAL
PCO2 BLDA: 26.8 MMHG (ref 35–45)
PH SMN: 7.42 [PH] (ref 7.35–7.45)
PLATELET # BLD AUTO: 183 K/UL (ref 150–450)
PLATELET # BLD AUTO: 272 K/UL (ref 150–450)
PLATELET # BLD AUTO: 274 K/UL (ref 150–450)
PMV BLD AUTO: 10.2 FL (ref 9.2–12.9)
PMV BLD AUTO: 9.9 FL (ref 9.2–12.9)
PMV BLD AUTO: 9.9 FL (ref 9.2–12.9)
PO2 BLDA: 75 MMHG (ref 80–100)
POC BE: -7 MMOL/L
POC SATURATED O2: 95 % (ref 95–100)
POC TCO2: 18 MMOL/L (ref 23–27)
POCT GLUCOSE: 114 MG/DL (ref 70–110)
POTASSIUM SERPL-SCNC: 4.9 MMOL/L (ref 3.5–5.1)
PROT SERPL-MCNC: 4.9 G/DL (ref 6–8.4)
RBC # BLD AUTO: 2.73 M/UL (ref 4.6–6.2)
RBC # BLD AUTO: 2.89 M/UL (ref 4.6–6.2)
RBC # BLD AUTO: 3.03 M/UL (ref 4.6–6.2)
SAMPLE: ABNORMAL
SITE: ABNORMAL
SODIUM SERPL-SCNC: 141 MMOL/L (ref 136–145)
WBC # BLD AUTO: 10.32 K/UL (ref 3.9–12.7)
WBC # BLD AUTO: 14.55 K/UL (ref 3.9–12.7)
WBC # BLD AUTO: 14.73 K/UL (ref 3.9–12.7)

## 2022-05-31 PROCEDURE — 43239 EGD BIOPSY SINGLE/MULTIPLE: CPT | Mod: ,,, | Performed by: INTERNAL MEDICINE

## 2022-05-31 PROCEDURE — 63600175 PHARM REV CODE 636 W HCPCS: Performed by: NURSE PRACTITIONER

## 2022-05-31 PROCEDURE — 27201012 HC FORCEPS, HOT/COLD, DISP: Performed by: INTERNAL MEDICINE

## 2022-05-31 PROCEDURE — 88341 PR IHC OR ICC EACH ADD'L SINGLE ANTIBODY  STAINPR: ICD-10-PCS | Mod: 26,,, | Performed by: PATHOLOGY

## 2022-05-31 PROCEDURE — 37000008 HC ANESTHESIA 1ST 15 MINUTES: Performed by: INTERNAL MEDICINE

## 2022-05-31 PROCEDURE — D9220A PRA ANESTHESIA: Mod: CRNA,,, | Performed by: NURSE ANESTHETIST, CERTIFIED REGISTERED

## 2022-05-31 PROCEDURE — 93010 ELECTROCARDIOGRAM REPORT: CPT | Mod: 76,,, | Performed by: INTERNAL MEDICINE

## 2022-05-31 PROCEDURE — C9113 INJ PANTOPRAZOLE SODIUM, VIA: HCPCS | Performed by: EMERGENCY MEDICINE

## 2022-05-31 PROCEDURE — 20000000 HC ICU ROOM

## 2022-05-31 PROCEDURE — 93005 ELECTROCARDIOGRAM TRACING: CPT

## 2022-05-31 PROCEDURE — 80053 COMPREHEN METABOLIC PANEL: CPT | Performed by: EMERGENCY MEDICINE

## 2022-05-31 PROCEDURE — 36430 TRANSFUSION BLD/BLD COMPNT: CPT

## 2022-05-31 PROCEDURE — 88305 TISSUE EXAM BY PATHOLOGIST: CPT | Performed by: PATHOLOGY

## 2022-05-31 PROCEDURE — D9220A PRA ANESTHESIA: ICD-10-PCS | Mod: CRNA,,, | Performed by: NURSE ANESTHETIST, CERTIFIED REGISTERED

## 2022-05-31 PROCEDURE — 43239 PR EGD, FLEX, W/BIOPSY, SGL/MULTI: ICD-10-PCS | Mod: ,,, | Performed by: INTERNAL MEDICINE

## 2022-05-31 PROCEDURE — 63600175 PHARM REV CODE 636 W HCPCS: Performed by: NURSE ANESTHETIST, CERTIFIED REGISTERED

## 2022-05-31 PROCEDURE — 36600 WITHDRAWAL OF ARTERIAL BLOOD: CPT

## 2022-05-31 PROCEDURE — 88342 IMHCHEM/IMCYTCHM 1ST ANTB: CPT | Mod: 26,,, | Performed by: PATHOLOGY

## 2022-05-31 PROCEDURE — 25000003 PHARM REV CODE 250: Performed by: NURSE ANESTHETIST, CERTIFIED REGISTERED

## 2022-05-31 PROCEDURE — 25000003 PHARM REV CODE 250: Performed by: EMERGENCY MEDICINE

## 2022-05-31 PROCEDURE — 99222 1ST HOSP IP/OBS MODERATE 55: CPT | Mod: ,,, | Performed by: NURSE PRACTITIONER

## 2022-05-31 PROCEDURE — 99222 PR INITIAL HOSPITAL CARE,LEVL II: ICD-10-PCS | Mod: ,,, | Performed by: NURSE PRACTITIONER

## 2022-05-31 PROCEDURE — 99231 SBSQ HOSP IP/OBS SF/LOW 25: CPT | Mod: 25,,, | Performed by: INTERNAL MEDICINE

## 2022-05-31 PROCEDURE — 99900035 HC TECH TIME PER 15 MIN (STAT)

## 2022-05-31 PROCEDURE — P9016 RBC LEUKOCYTES REDUCED: HCPCS | Performed by: INTERNAL MEDICINE

## 2022-05-31 PROCEDURE — 25000003 PHARM REV CODE 250: Performed by: INTERNAL MEDICINE

## 2022-05-31 PROCEDURE — 88341 IMHCHEM/IMCYTCHM EA ADD ANTB: CPT | Performed by: PATHOLOGY

## 2022-05-31 PROCEDURE — 93010 ELECTROCARDIOGRAM REPORT: CPT | Mod: ,,, | Performed by: INTERNAL MEDICINE

## 2022-05-31 PROCEDURE — 88342 CHG IMMUNOCYTOCHEMISTRY: ICD-10-PCS | Mod: 26,,, | Performed by: PATHOLOGY

## 2022-05-31 PROCEDURE — 43239 EGD BIOPSY SINGLE/MULTIPLE: CPT | Performed by: INTERNAL MEDICINE

## 2022-05-31 PROCEDURE — 88341 IMHCHEM/IMCYTCHM EA ADD ANTB: CPT | Mod: 26,,, | Performed by: PATHOLOGY

## 2022-05-31 PROCEDURE — 37000009 HC ANESTHESIA EA ADD 15 MINS: Performed by: INTERNAL MEDICINE

## 2022-05-31 PROCEDURE — 82803 BLOOD GASES ANY COMBINATION: CPT

## 2022-05-31 PROCEDURE — 27200997: Performed by: INTERNAL MEDICINE

## 2022-05-31 PROCEDURE — 88305 TISSUE EXAM BY PATHOLOGIST: CPT | Mod: 26,,, | Performed by: PATHOLOGY

## 2022-05-31 PROCEDURE — 85025 COMPLETE CBC W/AUTO DIFF WBC: CPT | Mod: 91 | Performed by: INTERNAL MEDICINE

## 2022-05-31 PROCEDURE — 43255 EGD CONTROL BLEEDING ANY: CPT | Mod: 59,,, | Performed by: INTERNAL MEDICINE

## 2022-05-31 PROCEDURE — 51702 INSERT TEMP BLADDER CATH: CPT

## 2022-05-31 PROCEDURE — D9220A PRA ANESTHESIA: ICD-10-PCS | Mod: ANES,,, | Performed by: ANESTHESIOLOGY

## 2022-05-31 PROCEDURE — 85025 COMPLETE CBC W/AUTO DIFF WBC: CPT | Performed by: EMERGENCY MEDICINE

## 2022-05-31 PROCEDURE — 88305 TISSUE EXAM BY PATHOLOGIST: ICD-10-PCS | Mod: 26,,, | Performed by: PATHOLOGY

## 2022-05-31 PROCEDURE — 43255 PR EGD, FLEX, W/CTRL BLEED, ANY METHOD: ICD-10-PCS | Mod: 59,,, | Performed by: INTERNAL MEDICINE

## 2022-05-31 PROCEDURE — 36415 COLL VENOUS BLD VENIPUNCTURE: CPT | Performed by: EMERGENCY MEDICINE

## 2022-05-31 PROCEDURE — 63600175 PHARM REV CODE 636 W HCPCS: Performed by: EMERGENCY MEDICINE

## 2022-05-31 PROCEDURE — 82140 ASSAY OF AMMONIA: CPT | Performed by: EMERGENCY MEDICINE

## 2022-05-31 PROCEDURE — 93010 EKG 12-LEAD: ICD-10-PCS | Mod: 76,,, | Performed by: INTERNAL MEDICINE

## 2022-05-31 PROCEDURE — 88342 IMHCHEM/IMCYTCHM 1ST ANTB: CPT | Performed by: PATHOLOGY

## 2022-05-31 PROCEDURE — 99231 PR SUBSEQUENT HOSPITAL CARE,LEVL I: ICD-10-PCS | Mod: 25,,, | Performed by: INTERNAL MEDICINE

## 2022-05-31 PROCEDURE — D9220A PRA ANESTHESIA: Mod: ANES,,, | Performed by: ANESTHESIOLOGY

## 2022-05-31 PROCEDURE — 43255 EGD CONTROL BLEEDING ANY: CPT | Mod: 59 | Performed by: INTERNAL MEDICINE

## 2022-05-31 PROCEDURE — S0166 INJ OLANZAPINE 2.5MG: HCPCS | Performed by: EMERGENCY MEDICINE

## 2022-05-31 RX ORDER — DEXMEDETOMIDINE HYDROCHLORIDE 4 UG/ML
0-1.4 INJECTION, SOLUTION INTRAVENOUS CONTINUOUS
Status: DISCONTINUED | OUTPATIENT
Start: 2022-05-31 | End: 2022-05-31

## 2022-05-31 RX ORDER — ETOMIDATE 2 MG/ML
INJECTION INTRAVENOUS
Status: DISCONTINUED | OUTPATIENT
Start: 2022-05-31 | End: 2022-05-31

## 2022-05-31 RX ORDER — HYDROCODONE BITARTRATE AND ACETAMINOPHEN 500; 5 MG/1; MG/1
TABLET ORAL
Status: DISCONTINUED | OUTPATIENT
Start: 2022-05-31 | End: 2022-06-03 | Stop reason: HOSPADM

## 2022-05-31 RX ORDER — DEXMEDETOMIDINE HYDROCHLORIDE 4 UG/ML
0-1.4 INJECTION, SOLUTION INTRAVENOUS CONTINUOUS
Status: DISCONTINUED | OUTPATIENT
Start: 2022-05-31 | End: 2022-06-01

## 2022-05-31 RX ORDER — ESMOLOL HYDROCHLORIDE 10 MG/ML
INJECTION INTRAVENOUS
Status: DISPENSED
Start: 2022-05-31 | End: 2022-06-01

## 2022-05-31 RX ORDER — DEXMEDETOMIDINE HYDROCHLORIDE 4 UG/ML
INJECTION, SOLUTION INTRAVENOUS
Status: DISPENSED
Start: 2022-05-31 | End: 2022-05-31

## 2022-05-31 RX ORDER — PROPOFOL 10 MG/ML
VIAL (ML) INTRAVENOUS
Status: DISCONTINUED | OUTPATIENT
Start: 2022-05-31 | End: 2022-05-31

## 2022-05-31 RX ORDER — MUPIROCIN 20 MG/G
OINTMENT TOPICAL 2 TIMES DAILY
Status: DISCONTINUED | OUTPATIENT
Start: 2022-05-31 | End: 2022-06-03 | Stop reason: HOSPADM

## 2022-05-31 RX ORDER — SUCCINYLCHOLINE CHLORIDE 20 MG/ML
INJECTION INTRAMUSCULAR; INTRAVENOUS
Status: DISCONTINUED | OUTPATIENT
Start: 2022-05-31 | End: 2022-05-31

## 2022-05-31 RX ORDER — ETOMIDATE 2 MG/ML
INJECTION INTRAVENOUS
Status: DISPENSED
Start: 2022-05-31 | End: 2022-06-01

## 2022-05-31 RX ORDER — SUCCINYLCHOLINE CHLORIDE 20 MG/ML
INJECTION INTRAMUSCULAR; INTRAVENOUS
Status: DISPENSED
Start: 2022-05-31 | End: 2022-06-01

## 2022-05-31 RX ORDER — PHENYLEPHRINE HCL IN 0.9% NACL 1 MG/10 ML
SYRINGE (ML) INTRAVENOUS
Status: DISPENSED
Start: 2022-05-31 | End: 2022-06-01

## 2022-05-31 RX ORDER — LIDOCAINE HYDROCHLORIDE 20 MG/ML
INJECTION INTRAVENOUS
Status: DISCONTINUED | OUTPATIENT
Start: 2022-05-31 | End: 2022-05-31

## 2022-05-31 RX ORDER — LIDOCAINE HYDROCHLORIDE 20 MG/ML
INJECTION, SOLUTION EPIDURAL; INFILTRATION; INTRACAUDAL; PERINEURAL
Status: DISPENSED
Start: 2022-05-31 | End: 2022-06-01

## 2022-05-31 RX ORDER — PROPOFOL 10 MG/ML
INJECTION, EMULSION INTRAVENOUS
Status: DISPENSED
Start: 2022-05-31 | End: 2022-06-01

## 2022-05-31 RX ORDER — PANTOPRAZOLE SODIUM 40 MG/10ML
40 INJECTION, POWDER, LYOPHILIZED, FOR SOLUTION INTRAVENOUS 2 TIMES DAILY
Status: DISCONTINUED | OUTPATIENT
Start: 2022-05-31 | End: 2022-06-03 | Stop reason: HOSPADM

## 2022-05-31 RX ORDER — OLANZAPINE 10 MG/2ML
5 INJECTION, POWDER, FOR SOLUTION INTRAMUSCULAR ONCE AS NEEDED
Status: COMPLETED | OUTPATIENT
Start: 2022-05-31 | End: 2022-05-31

## 2022-05-31 RX ORDER — PHENYLEPHRINE HYDROCHLORIDE 10 MG/ML
INJECTION INTRAVENOUS
Status: DISCONTINUED | OUTPATIENT
Start: 2022-05-31 | End: 2022-05-31

## 2022-05-31 RX ADMIN — OLANZAPINE 5 MG: 10 INJECTION, POWDER, FOR SOLUTION INTRAMUSCULAR at 02:05

## 2022-05-31 RX ADMIN — SUCCINYLCHOLINE CHLORIDE 100 MG: 20 INJECTION, SOLUTION INTRAMUSCULAR; INTRAVENOUS at 03:05

## 2022-05-31 RX ADMIN — ESMOLOL HYDROCHLORIDE 10 MG: 10 INJECTION INTRAVENOUS at 03:05

## 2022-05-31 RX ADMIN — LIDOCAINE HYDROCHLORIDE 100 MG: 20 INJECTION, SOLUTION INTRAVENOUS at 03:05

## 2022-05-31 RX ADMIN — DEXMEDETOMIDINE HYDROCHLORIDE 0.3 MCG/KG/HR: 4 INJECTION, SOLUTION INTRAVENOUS at 04:05

## 2022-05-31 RX ADMIN — MUPIROCIN: 20 OINTMENT TOPICAL at 08:05

## 2022-05-31 RX ADMIN — SODIUM CHLORIDE: 0.9 INJECTION, SOLUTION INTRAVENOUS at 08:05

## 2022-05-31 RX ADMIN — DEXMEDETOMIDINE HYDROCHLORIDE 0.3 MCG/KG/HR: 4 INJECTION, SOLUTION INTRAVENOUS at 08:05

## 2022-05-31 RX ADMIN — MUPIROCIN: 20 OINTMENT TOPICAL at 10:05

## 2022-05-31 RX ADMIN — PROPOFOL 20 MG: 10 INJECTION, EMULSION INTRAVENOUS at 03:05

## 2022-05-31 RX ADMIN — PHENYLEPHRINE HYDROCHLORIDE 100 MCG: 10 INJECTION INTRAVENOUS at 03:05

## 2022-05-31 RX ADMIN — PANTOPRAZOLE SODIUM 40 MG: 40 INJECTION, POWDER, FOR SOLUTION INTRAVENOUS at 08:05

## 2022-05-31 RX ADMIN — PANTOPRAZOLE SODIUM 40 MG: 40 INJECTION, POWDER, FOR SOLUTION INTRAVENOUS at 03:05

## 2022-05-31 RX ADMIN — SODIUM CHLORIDE 1000 ML: 0.9 INJECTION, SOLUTION INTRAVENOUS at 08:05

## 2022-05-31 RX ADMIN — SODIUM CHLORIDE: 0.9 INJECTION, SOLUTION INTRAVENOUS at 02:05

## 2022-05-31 RX ADMIN — ETOMIDATE 10 MG: 2 INJECTION, SOLUTION INTRAVENOUS at 03:05

## 2022-05-31 NOTE — PROGRESS NOTES
[facility] Medicine  Progress Note    Patient Name: Rayo Duran Sr.  MRN: [id number]  Patient Class: IP- Inpatient   Admission Date: 5/30/2022  Length of Stay: 1 days  Attending Physician: Tonie Rivera MD  Primary Care Provider: Caroline Florez MD        Subjective:     Principal Problem:Acute blood loss anemia        HPI:  Mr. Duran is a 85 yo M who presents with a CC of vomiting black material for 2 days.  Patient presented to an ED in [place]- and was transferred here for evaluation of a likely upper GI bleed.  Patient found to have a Hgb of 6.4.  Patient takes Alkaseltser several times a week. No abdominal pain. No history of GI bleed.  Patient received 2 units of blood. Otherwise- he has no complaints. He is on no medications.  Only med issue is hypertension- but he states he does not take any meds for this. He is non-ill appearing and joking around     - David Masterson MD      Overview/Hospital Course:  Mr Duran presented with acute blood loss anemia secondary to GI bleed. Developed hyperactive delirium. Received a dose of lorazepam overnight with paradoxical effect. Very delirious and hyperactive. Is in 4 point restraints. Also with bright red blood per rectum, per nursing report. Two units of blood ordered. Transferred to ICU for precedex infusion      Interval History: very delirious and hyperactive. Got 1 mg of lorazepam last night. Per family, he has not slept and has been very delirious. With active GI bleed. BP borderline low. On fluids.     Review of Systems   Unable to perform ROS: Mental status change   Objective:     Vital Signs (Most Recent):  Temp: 98 °F (36.7 °C) (05/31/22 1215)  Pulse: 86 (05/31/22 1215)  Resp: (!) 21 (05/31/22 1215)  BP: 103/61 (05/31/22 1215)  SpO2: 100 % (05/31/22 1215)   Vital Signs (24h Range):  Temp:  [97.6 °F (36.4 °C)-99.3 °F (37.4 °C)] 98 °F (36.7 °C)  Pulse:  [] 86  Resp:  [18-39] 21  SpO2:  [96 %-100 %] 100 %  BP:  (102-143)/(57-85) 103/61     Weight: 77.5 kg (170 lb 13.7 oz)  Body mass index is 24.52 kg/m².    Intake/Output Summary (Last 24 hours) at 5/31/2022 1222  Last data filed at 5/31/2022 1150  Gross per 24 hour   Intake 254.17 ml   Output 250 ml   Net 4.17 ml      Physical Exam  Vitals and nursing note reviewed.   Constitutional:       Appearance: He is ill-appearing.   Cardiovascular:      Rate and Rhythm: Regular rhythm. Tachycardia present.   Pulmonary:      Effort: Pulmonary effort is normal.      Breath sounds: No wheezing or rales.   Abdominal:      General: Bowel sounds are normal. There is no distension.      Palpations: Abdomen is soft.      Tenderness: There is no abdominal tenderness.   Neurological:      Mental Status: He is alert. He is disoriented.   Psychiatric:         Attention and Perception: He is inattentive.         Mood and Affect: Mood is anxious.         Behavior: Behavior is hyperactive.         Cognition and Memory: Cognition is impaired. Memory is impaired.       Significant Labs: All pertinent labs within the past 24 hours have been reviewed.    Significant Imaging: I have reviewed all pertinent imaging results/findings within the past 24 hours.  I have reviewed and interpreted all pertinent imaging results/findings within the past 24 hours.      Assessment/Plan:      * Acute blood loss anemia  Wife and son report he is not on anticoagulants or antiplatelets   Has rivaroxaban listed in his home med list. Not sure what for  Needs blood transfusion today. Two units ordered. Will recheck Hgb level after unit complete.  Does not need vasopressor support at this time  Agree with pantoprazole and IVFs.   Gastroenterology consulted.      Delirium  At high risk for falls and self injury  CT brain showed generalized volume loss but acute pathology or mass  Will transfer to ICU for precedex        Gastrointestinal bleed  As above  Hold off on antiplatelets and anticoagulants       HTN (hypertension),  benign  BP not high. Hold off on antihypertensives      VTE Risk Mitigation (From admission, onward)         Ordered     IP VTE HIGH RISK PATIENT  Once         05/30/22 1701     Place sequential compression device  Until discontinued         05/30/22 1701                Discharge Planning   PHILLIP:      Code Status: Full Code   Is the patient medically ready for discharge?:     Reason for patient still in hospital (select all that apply): Treatment         Discussed with son and wife at bedside.       Critical care time spent on the evaluation and treatment of severe organ dysfunction, review of pertinent labs and imaging studies, discussions with consulting providers and discussions with patient/family: > 35 minutes.      Tonie Van MD  Department of Hospital Medicine   [facility] - [place], [place] - Intensive Care

## 2022-05-31 NOTE — CARE UPDATE
Denies known Latex allergy or symptoms of Latex sensitivity.  Medications verified, no changes.  History   Smoking Status   • Former Smoker   Smokeless Tobacco   • Never Used     Comment:   quit 10 years ago     Drew Guerrero is a 78 year old male presenting with c/o urinary frequency and incontinence     RAPID RESPONSE NURSE PROACTIVE ROUNDING NOTE       Time of Visit: 730    Admit Date: 2022  LOS: 1  Code Status: Full Code   Date of Visit: 2022  : 1935  Age: 86 y.o.  Sex: male  Race: White  Bed: W267/W267 A:   MRN: [id number]  Was the patient discharged from an ICU this admission? Yes   Was the patient discharged from a PACU within last 24 hours? No   Did the patient receive conscious sedation/general anesthesia in last 24 hours? No   Was the patient in the ED within the past 24 hours? No   Was the patient on NIPPV within the past 24 hours? No   Attending Physician: Tonie Rivera MD  Primary Service: Hospitalist   Time spent at the bedside: 45 - 60 min    SITUATION    Notified by Epic patient alert  Reason for alert: -140s    Diagnosis: Acute blood loss anemia   has a past medical history of Hypertension, Stomach ulcer, and Tumor.    Last Vitals:  Temp: 98.3 °F (36.8 °C) (959)  Pulse: 115 (959)  Resp: 31 (959)  BP: 133/75 (959)  SpO2: 100 % (959)    24 Hour Vitals Range:  Temp:  [97.9 °F (36.6 °C)-99.3 °F (37.4 °C)]   Pulse:  []   Resp:  [18-39]   BP: (115-143)/(61-85)   SpO2:  [96 %-100 %]     Clinical Issues: Circulatory  ASSESSMENT/INTERVENTIONS    Patient HR noted 130-140s throughout the night on the telemetry monitor  and MEWS 4, up on assessment and 12 lead EKG HR remains 120-130s ST, -130s,Patient very restless and pleasantly confused  but easily redirectable, according to his wife pt vomited blood X 1 and haven't see him voiding, also his wife stated pt has restless leg syndrome, CBC collected H &  H 6.4 and 24,  notified , got order for NS 1L bolus, and PRBC 2 unit.      RECOMMENDATIONS  Hydration, blood transfusion    Discussed plan of care with Charge RNGeneva/ Marie     PROVIDER ESCALATION    Physician escalation: Yes    Orders received and case discussed with Dr. Van .    Disposition:Tx in ICU bed 267    FOLLOW  UP

## 2022-05-31 NOTE — HPI
HPI:  Per chart review Mr. Duran is a 87 yo M who presents with a CC of vomiting black material for 2 days.  Patient presented to an ED in [place]- and was transferred here for evaluation of a likely upper GI bleed.  Patient found to have a Hgb of 6.4.  Patient takes Alkaseltser several times a week. No abdominal pain. No history of GI bleed.  Patient received 2 units of blood. Otherwise- he has no complaints. He is on no medications.  Only med issue is hypertension- but he states he does not take any meds for this. He is non-ill appearing and joking around            Overview/Hospital Course:  Mr Duran presented with acute blood loss anemia secondary to GI bleed. Developed hyperactive delirium. Received a dose of lorazepam overnight with paradoxical effect. Very delirious and hyperactive. Is in 4 point restraints. Also with bright red blood per rectum, per nursing report. Two units of blood ordered. Transferred to ICU for precedex infusion        Interval History: very delirious and hyperactive. Got 1 mg of lorazepam last night. With active GI bleed. BP borderline low. On fluids.

## 2022-05-31 NOTE — PLAN OF CARE
Problem: Adult Inpatient Plan of Care  Goal: Plan of Care Review  Outcome: Ongoing, Progressing  Goal: Absence of Hospital-Acquired Illness or Injury  Outcome: Ongoing, Progressing     Problem: Fall Injury Risk  Goal: Absence of Fall and Fall-Related Injury  Outcome: Ongoing, Progressing     Problem: Adult Inpatient Plan of Care  Goal: Patient-Specific Goal (Individualized)  Outcome: Ongoing, Not Progressing  Goal: Optimal Comfort and Wellbeing  Outcome: Ongoing, Not Progressing  Goal: Readiness for Transition of Care  Outcome: Ongoing, Not Progressing     Problem: Adjustment to Illness (Gastrointestinal Bleeding)  Goal: Optimal Coping with Acute Illness  Outcome: Ongoing, Not Progressing     Problem: Bleeding (Gastrointestinal Bleeding)  Goal: Hemostasis  Outcome: Ongoing, Not Progressing     Problem: Restraint, Nonbehavioral (Nonviolent)  Goal: Absence of Harm or Injury  Outcome: Ongoing, Not Progressing    Pt continues to be confused along with hallucinations.

## 2022-05-31 NOTE — ANESTHESIA POSTPROCEDURE EVALUATION
Anesthesia Post Evaluation    Patient: Rayo Duran     Procedure(s) Performed: Procedure(s) (LRB):  EGD (ESOPHAGOGASTRODUODENOSCOPY) (N/A)    Final Anesthesia Type: general      Patient location during evaluation: ICU  Patient participation: No - Unable to Participate, Other Reason (see comments) (pt with altered mental status)  Level of consciousness: confused  Post-procedure vital signs: reviewed and stable  Pain management: adequate  Airway patency: patent    PONV status at discharge: No PONV  Anesthetic complications: no      Cardiovascular status: hemodynamically stable  Respiratory status: unassisted and spontaneous ventilation  Hydration status: euvolemic  Follow-up not needed.          Vitals Value Taken Time   /69 05/31/22 1631   Temp 36.5 °C (97.7 °F) 05/31/22 1615   Pulse 85 05/31/22 1726   Resp 21 05/31/22 1726   SpO2 100 % 05/31/22 1726   Vitals shown include unvalidated device data.      No case tracking events are documented in the log.      Pain/Olga Score: No data recorded

## 2022-05-31 NOTE — NURSING
Report given to nurse Anjel who will assume the patient care.he is resting comfortably. Completed chart check.

## 2022-05-31 NOTE — PROGRESS NOTES
[facility] [place] [facility]  Gastroenterology  Progress Note    Patient Name: Rayo Duran Sr.  MRN: [id number]  Admission Date: 5/30/2022  Hospital Length of Stay: 1 days  Code Status: Full Code   Attending Provider: Tonie Rivera MD  Consulting Provider: Mario Escalera MD  Primary Care Physician: Caroline Florez MD  Principal Problem: Acute blood loss anemia    Subjective:     Interval History:  Patient seen last night for GI bleeding.  At that time he had hemodynamic stability and was not having any signs of active or ongoing bleeding.  Ppi was started for presumed upper GI bleed.    Over the night he had 1 episode of hematemesis as reported by the wife and nursing staff.  He has had no melena.  He did have agitation.  He has had stable blood pressure but tachycardia which could be either due to the agitation or the blood loss.  Since admission his hemoglobin did fall to 6.4.  He is unable to give any history now because of the altered mental status and agitation.  Review of Systems  Objective:     Vital Signs (Most Recent):  Temp: 98 °F (36.7 °C) (05/31/22 0914)  Pulse: (!) 130 (05/31/22 0915)  Resp: (!) 24 (05/31/22 0915)  BP: 133/75 (05/31/22 0915)  SpO2: 99 % (05/31/22 0915) Vital Signs (24h Range):  Temp:  [97.9 °F (36.6 °C)-99.3 °F (37.4 °C)] 98 °F (36.7 °C)  Pulse:  [] 130  Resp:  [18-24] 24  SpO2:  [96 %-100 %] 99 %  BP: (115-143)/(61-85) 133/75     Weight: 86.2 kg (190 lb 0.6 oz) (05/31/22 0914)  Body mass index is 27.27 kg/m².      Intake/Output Summary (Last 24 hours) at 5/31/2022 0932  Last data filed at 5/30/2022 1600  Gross per 24 hour   Intake --   Output 250 ml   Net -250 ml       Lines/Drains/Airways     Peripheral Intravenous Line  Duration                Peripheral IV - Single Lumen 05/30/22 0614 18 G Right Antecubital 1 day                Physical Exam    Significant Labs:  CBC:   Recent Labs   Lab 05/30/22 1754 05/31/22  0218 05/31/22  0754   WBC 11.41 14.73* 14.55*   HGB 8.8*  6.9* 6.4*   HCT 29.6* 23.2* 21.9*    272 274         Significant Imaging:      Assessment/Plan:     Active Diagnoses:    Diagnosis Date Noted POA    PRINCIPAL PROBLEM:  Acute blood loss anemia [D62] 05/30/2022 Yes    HTN (hypertension), benign [I10] 05/30/2022 Yes      Problems Resolved During this Admission:       Assessment.  Upper GI bleeding, evidence of continued bleeding and fall in hemoglobin.  Will plan on EGD today after the initiation of blood transfusion.  Will have a conference with anesthesia regarding possible intubation for the procedure.  We will now need to get consent from the wife as I no longer think he is able to give consent    Thank you for your consult. I will follow-up with patient. Please contact us if you have any additional questions.    Mario Escalera MD  Gastroenterology  [facility] - Intensive Care

## 2022-05-31 NOTE — NURSING
Received report from nurse Anjel who states the patient did not sleep on last night. He was all over the bed after receiving Ativan ivp pt had to be put in 2 points restraints. Upon arriving this am patient was awake and oriented to self he was still restless and in 2 point restraints.

## 2022-05-31 NOTE — HOSPITAL COURSE
Mr Duran presented with acute blood loss anemia secondary to GI bleed. Developed hyperactive delirium. Received a dose of lorazepam overnight with paradoxical effect. Very delirious and hyperactive. Is in 4 point restraints. Also with bright red blood per rectum, per nursing report. Two units of blood ordered. Transferred to ICU for precedex infusion. Transfused 2 U of PRBCs. Underwent EGD:    - Normal esophagus.   - A Billroth II anastomosis was found, characterized by ulceration.   - Spurting gastric ulcer with a visible vessel. Clips were placed. Ulcerated mass as the bleeding cource. Biopsied.   - Clotted blood in the gastric body. Fluid aspiration performed.     Weaned off precedex and was AAO x 3. Able to participate in exam and felt well. BP on low end of normal. Got extra IVFs. PICC to be placed in case vasopressor support required. Started clear liquid diet. Did very well overall and no bleeding. Did not require vasopressors. Diet advanced. Stepped down to floor. Continued stable and requested discharge. Cleared by GI. Home health for PT/OT to be set up as recommended. Will f/u with GI for repeat EGD. Rx for pantoprazole 40 mg BID sent to pharmacy. Discussed with patient and son at bedside.

## 2022-05-31 NOTE — NURSING
Dr. Patiño notified via phone regarding Mr. Duran's attempting to get out of bed, becoming combative with staff, having hallucinations, and confusion post 1mg of Ativan per Carline Christiansen, MARILEE. Pt also threw a cup of soda and the pitcher of water on the floor. This nurse and other staff remained in the patient's room to clean up and monitor until Dr. Patiño came to evaluate. Dr. BARRIENTOS arrived and evaluated the patient. After reviewing the patient's chart, she ordered non-violent restraints. Pt has bilateral soft wrist restraints in place as of 0120. Pt is continuing to have restless moments along with kicking the side rails and hallucinating/confusion. This nurse is sitting at the door of the patient as an additional  safety measure. Pt wife remains at bedside.

## 2022-05-31 NOTE — SUBJECTIVE & OBJECTIVE
Interval History: very delirious and hyperactive. Got 1 mg of lorazepam last night. Per family, he has not slept and has been very delirious. With active GI bleed. BP borderline low. On fluids.     Review of Systems   Unable to perform ROS: Mental status change   Objective:     Vital Signs (Most Recent):  Temp: 98 °F (36.7 °C) (05/31/22 1215)  Pulse: 86 (05/31/22 1215)  Resp: (!) 21 (05/31/22 1215)  BP: 103/61 (05/31/22 1215)  SpO2: 100 % (05/31/22 1215)   Vital Signs (24h Range):  Temp:  [97.6 °F (36.4 °C)-99.3 °F (37.4 °C)] 98 °F (36.7 °C)  Pulse:  [] 86  Resp:  [18-39] 21  SpO2:  [96 %-100 %] 100 %  BP: (102-143)/(57-85) 103/61     Weight: 77.5 kg (170 lb 13.7 oz)  Body mass index is 24.52 kg/m².    Intake/Output Summary (Last 24 hours) at 5/31/2022 1222  Last data filed at 5/31/2022 1150  Gross per 24 hour   Intake 254.17 ml   Output 250 ml   Net 4.17 ml      Physical Exam  Vitals and nursing note reviewed.   Constitutional:       Appearance: He is ill-appearing.   Cardiovascular:      Rate and Rhythm: Regular rhythm. Tachycardia present.   Pulmonary:      Effort: Pulmonary effort is normal.      Breath sounds: No wheezing or rales.   Abdominal:      General: Bowel sounds are normal. There is no distension.      Palpations: Abdomen is soft.      Tenderness: There is no abdominal tenderness.   Neurological:      Mental Status: He is alert. He is disoriented.   Psychiatric:         Attention and Perception: He is inattentive.         Mood and Affect: Mood is anxious.         Behavior: Behavior is hyperactive.         Cognition and Memory: Cognition is impaired. Memory is impaired.       Significant Labs: All pertinent labs within the past 24 hours have been reviewed.    Significant Imaging: I have reviewed all pertinent imaging results/findings within the past 24 hours.  I have reviewed and interpreted all pertinent imaging results/findings within the past 24 hours.

## 2022-05-31 NOTE — PROGRESS NOTES
[facility] Progress NOTE      Admit Date: 2022  LOS: 1  Code Status: Full Code   Date of Consult: 2022  : 1935  Age: 86 y.o.  Weight:   Wt Readings from Last 1 Encounters:   22 86.2 kg (190 lb)     Sex: male  Bed: [place] [address] A:   MRN: [id number]  Attending Physician: David Masterson MD  Primary Service: Networked reference to record PCT              Called by RN for agitation.  Patient apparently turning in bed in a manner that his head is where his feet are and vice versa.  He had an episode of hematemesis. He will not keep telemetry monitor on.  Family states patient was angry with her earlier in the evening because she did not bring his tobacco.  Per wife, it is unusual for him to act this way.     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   Constitutional:       Appearance: He is not ill-appearing, toxic-appearing or diaphoretic.   Cardiovascular:      Rate and Rhythm: Regular rhythm. Tachycardia present.   Pulmonary:      Effort: Tachypnea present. No bradypnea, accessory muscle usage, prolonged expiration or retractions.      Breath sounds: Normal breath sounds. No decreased air movement or transmitted upper airway sounds. No decreased breath sounds, wheezing or rhonchi.   Neurological:      Mental Status: He is alert. He is  confused.      GCS: GCS eye subscore is 2. GCS verbal subscore is 4. GCS motor subscore is 5.      Motor: No weakness, tremor or atrophy.         Tests      Latest Reference Range & Units 12/11/20 05:00 05/30/22 06:09 05/30/22 17:54   Hemoglobin 14.0 - 18.0 g/dL 11.1 (L) 6.4 (L) 8.8 (L)        Latest Reference Range & Units 12/11/20 05:00 05/30/22 06:09   Sodium 136 - 145 mmol/L 136 143   BUN 8 - 23 mg/dL 19 42 (H)   Creatinine 0.5 - 1.4 mg/dL 1.20 1.3   Glucose 70 - 110 mg/dL 111 (H) 179 (H)      Latest Reference Range & Units 05/30/22 06:04   Occult Blood Negative  Positive !     EKG  No STEMI  NSR with HR 98  Nl conduction, nl intervals  Nl ST and T wave   No ectopy, Nl axis      ASSESSMENT:   1. AMS secondary to UTI, worsening dementia, sundowning, hypercapnia vs hyperammonemia. Unlikely ICH or medication side effect  2. GIB    PLAN:   Wife to stay with patient as much as possible. I spoke in a calm/quiet voice, active listening, attention to feelings and concerns, redirection/re-orientation, one-on-one verbal communication, decrease stimuli, provide the diversion, review medications, frequent observation and position changes for comfort, and relaxation techniques.  Above alternatives attempted prior to administering medication and restraint.    Check VBG, ammonia, CMP, UA.       GI consulted. Serial H/H  Provision of supplemental oxygen by nasal cannula, NPO, IV access  Nasogastric lavage is NOT a routine part of the management of acute upper GI bleeding.   - IV proton pump inhibitor (changed to BID vs gtt secondary to patient pulling on lines)    Initiate blood transfusion if the hemoglobin is <7 g/dL    Patient placed on continuous cardiac monitor, automatic blood pressure cuff and continuous pulse oximeter.    Will continue frequent clinical reassessment including vital signs

## 2022-05-31 NOTE — AI DETERIORATION ALERT
Artificial Intelligence Notification  [facility]  [place] [id number]  Phone: [phone]     This documentation was triggered by an Artificial Intelligence Notification.     Admit Date: 2022   LOS: 1  Code Status: Full Code  : 1935  Age: 86 y.o.  Weight:   Wt Readings from Last 1 Encounters:   22 86.2 kg (190 lb)        Sex: male  Bed: [address]  MRN: [id number]  Attending Physician: Tonie Rivera MD     Date of Alert: 2022  With hyperactive delirium. Will move to ICU for precedex.              Vitals:    22 0828   BP: 131/66   Pulse: (!) 132   Resp: 18   Temp: 98.1 °F (36.7 °C)             Patient Condition: critical

## 2022-05-31 NOTE — ASSESSMENT & PLAN NOTE
At high risk for falls and self injury  CT brain showed generalized volume loss but acute pathology or mass  Will transfer to ICU for precedex

## 2022-05-31 NOTE — ASSESSMENT & PLAN NOTE
Wife and son report he is not on anticoagulants or antiplatelets   Has rivaroxaban listed in his home med list. Not sure what for  Needs blood transfusion today. Two units ordered. Will recheck Hgb level after unit complete.  Does not need vasopressor support at this time  Agree with pantoprazole and IVFs.   Gastroenterology consulted.

## 2022-05-31 NOTE — NURSING
Rapid response nurse at the bedside patient's heart rate in the 130's. EKG done sinus tachycardia. Pt in 2 point restraints and still restless. o2 3l per n/c started for o2 sat 97%

## 2022-05-31 NOTE — TRANSFER OF CARE
"Anesthesia Transfer of Care Note    Patient: Rayo Duran Sr.    Procedure(s) Performed: Procedure(s) (LRB):  EGD (ESOPHAGOGASTRODUODENOSCOPY) (N/A)    Patient location: ICU    Anesthesia Type: general    Transport from OR: Transported from OR on 100% O2 by closed face mask with adequate spontaneous ventilation    Post pain: adequate analgesia    Post assessment: no apparent anesthetic complications and tolerated procedure well    Post vital signs: stable    Level of consciousness: awake and alert    Nausea/Vomiting: no nausea/vomiting    Complications: none    Transfer of care protocol was followed      Last vitals:   Visit Vitals  /63 (BP Location: Left arm, Patient Position: Lying)   Pulse 96   Temp 36.5 °C (97.7 °F) (Skin)   Resp 17   Ht 5' 10" (1.778 m)   Wt 77.5 kg (170 lb 13.7 oz)   SpO2 99%   BMI 24.52 kg/m²     "

## 2022-05-31 NOTE — NURSING
Notified Dr. Van concerning the patient's restlessness and trying to climb out of bed. He is in 2 point restraints he may need 4 pts.

## 2022-05-31 NOTE — ANESTHESIA PROCEDURE NOTES
Intubation    Date/Time: 5/31/2022 3:34 PM  Performed by: Kierra Beltre CRNA  Authorized by: Kierra Beltre CRNA     Intubation:     Induction:  Rapid sequence induction    Intubated:  Postinduction    Attempts:  1    Attempted By:  CRNA    Blade:  Olivo 2    Laryngeal View Grade: Grade I - full view of cords      Difficult Airway Encountered?: No      Complications:  None    Airway Device:  Oral endotracheal tube    Airway Device Size:  7.0    Style/Cuff Inflation:  Cuffed    Inflation Amount (mL):  5    Tube secured:  21    Secured at:  The lips    Placement Verified By:  Capnometry    Complicating Factors:  None    Findings Post-Intubation:  BS equal bilateral (edentureless)

## 2022-05-31 NOTE — CONSULTS
[facility] - Intensive Care  Palliative Medicine  Consult Note    Patient Name: Rayo Duran Sr.  MRN: [id number]  Admission Date: 5/30/2022  Hospital Length of Stay: 1 days  Code Status: Full Code   Attending Provider: Tonie Rivera MD  Consulting Provider: Janette Fitzpatrick NP  Primary Care Physician: Caroline Florez MD  Principal Problem:Acute blood loss anemia    Patient information was obtained from relative(s), ER records and primary team.      Inpatient consult to Palliative Care  Consult performed by: Janette Fitzpatrick NP  Consult ordered by: Tonie Rivera MD  Reason for consult: GOC        Assessment/Plan:   Palliative encounter:    -Palliative consult for GOC and to start conversations on a 85 y/o patient being transferred to ICU for mental status change and GI bleed requiring transfusion  -chart reviewed in detail  -At time of consult patient in ICU. He is restrained and very agitated moving all extremities. Precedex recently started after patient has not been able to calm or rest in last 24 hours.   Patient son in room. He states his mother stayed the night and did not rest so she is gone to sleep. He reports at baseline patient is alert and oriented, independent and still mows his grass.   -update given  -Engaged son in a conversation about ACP and he states he does not know what his father's wishes would be concerning life supportive measures and that we will need to address with his mother, or patient if he returns to baseline.   -Addressed all questions and concerns  -Emotional support provided  -I will f/u with spouse tomorrow as she has gone home to sleep.      Acute blood loss anemia  No anticoag therapy hx  Has rivaroxaban listed in his home med list.   Does not need vasopressor support at this time  Transfusions PRN  GI following      Delirium  At high risk for falls and self injury  CT brain showed generalized volume loss but acute pathology or mass  Transferred to ICU for  precedex  Remains in restraints and agitated;precedex recently started     Gastrointestinal bleed  As above  Hold off on antiplatelets and anticoagulants   GI following  Primary mgmt     HTN (hypertension), benign  Noted    Thank you for your consult. I will follow-up with patient. Please contact us if you have any additional questions.    Subjective:       HPI:  Per chart review Mr. Duran is a 85 yo M who presents with a CC of vomiting black material for 2 days.  Patient presented to an ED in [place]- and was transferred here for evaluation of a likely upper GI bleed.  Patient found to have a Hgb of 6.4.  Patient takes Alkaseltser several times a week. No abdominal pain. No history of GI bleed.  Patient received 2 units of blood. Otherwise- he has no complaints. He is on no medications.  Only med issue is hypertension- but he states he does not take any meds for this. He is non-ill appearing and joking around            Overview/Hospital Course:  Mr Duran presented with acute blood loss anemia secondary to GI bleed. Developed hyperactive delirium. Received a dose of lorazepam overnight with paradoxical effect. Very delirious and hyperactive. Is in 4 point restraints. Also with bright red blood per rectum, per nursing report. Two units of blood ordered. Transferred to ICU for precedex infusion        Interval History: very delirious and hyperactive. Got 1 mg of lorazepam last night. With active GI bleed. BP borderline low. On fluids.       Hospital Course:  No notes on file    Interval History: patient just transferred to ICU earlier today. Continues to be agitated, in restraints. 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 No pertinent surgical history.    Review of patient's allergies indicates:  No Known Allergies    Medications:  Continuous Infusions:   sodium chloride 0.9% 100 mL/hr at 05/30/22  1925    dexmedetomidine (PRECEDEX) infusion       Scheduled Meds:   mupirocin   Nasal BID    pantoprazole  40 mg Intravenous BID    sodium chloride 0.9%  1,000 mL Intravenous Once     PRN Meds:sodium chloride    Family History    None       Tobacco Use    Smoking status: Not on file    Smokeless tobacco: Not on file   Substance and Sexual Activity    Alcohol use: Not on file    Drug use: Not on file    Sexual activity: Not on file       Review of Systems   Unable to perform ROS: Acuity of condition   Objective:     Vital Signs (Most Recent):  Temp: 98 °F (36.7 °C) (05/31/22 1215)  Pulse: 86 (05/31/22 1215)  Resp: (!) 21 (05/31/22 1215)  BP: 103/61 (05/31/22 1215)  SpO2: 100 % (05/31/22 1215)   Vital Signs (24h Range):  Temp:  [97.6 °F (36.4 °C)-99.3 °F (37.4 °C)] 98 °F (36.7 °C)  Pulse:  [] 86  Resp:  [18-39] 21  SpO2:  [96 %-100 %] 100 %  BP: (102-143)/(57-85) 103/61     Weight: 77.5 kg (170 lb 13.7 oz)  Body mass index is 24.52 kg/m².    Physical Exam  Vitals and nursing note reviewed.   Constitutional:       General: He is in acute distress.      Appearance: He is ill-appearing. He is not toxic-appearing or diaphoretic.      Comments: In restraints, agitated   HENT:      Mouth/Throat:      Mouth: Mucous membranes are dry.   Cardiovascular:      Rate and Rhythm: Tachycardia present.   Pulmonary:      Effort: Pulmonary effort is normal.   Abdominal:      General: Abdomen is flat. Bowel sounds are normal. There is no distension.      Palpations: Abdomen is soft.   Musculoskeletal:      Right lower leg: No edema.      Left lower leg: No edema.   Skin:     General: Skin is warm and dry.   Neurological:      Comments: On sedation but remains agitated and moving extremities    Psychiatric:      Comments: Unable to assess due to mental status change and sedation        Review of Symptoms      Symptom Assessment (ESAS 0-10 Scale)  Unable to complete assessment due to Acuity of condition       Pain  Assessment in Advanced Demential Scale (PAINAD)   Breathing - Independent of vocalization:  0  Negative vocalization:  2  Facial expression:  2  Body language:  2  Consolability:  2  Total:  8    Advance Care Planning   Advance Care Planning       Significant Labs: All pertinent labs within the past 24 hours have been reviewed.  CBC:   Recent Labs   Lab 05/31/22  0754   WBC 14.55*   HGB 6.4*   HCT 21.9*   MCV 80*        BMP:  Recent Labs   Lab 05/31/22  0218   *      K 4.9   *   CO2 18*   BUN 52*   CREATININE 1.1   CALCIUM 7.8*     LFT:  Lab Results   Component Value Date    AST 15 05/31/2022    ALKPHOS 67 05/31/2022    BILITOT 2.2 (H) 05/31/2022     Albumin:   Albumin   Date Value Ref Range Status   05/31/2022 2.6 (L) 3.5 - 5.2 g/dL Final     Protein:   Total Protein   Date Value Ref Range Status   05/31/2022 4.9 (L) 6.0 - 8.4 g/dL Final     Lactic acid:   No results found for: LACTATE    Significant Imaging: I have reviewed all pertinent imaging results/findings within the past 24 hours. CT head        > 50% of 50 min visit spent in chart review, face to face discussion of goals of care,  symptom assessment, coordination of care and emotional support.    Janette Fitzpatrick NP  Palliative Medicine  [facility] - Intensive Care

## 2022-05-31 NOTE — SUBJECTIVE & OBJECTIVE
Interval History: patient just transferred to ICU earlier today. Continues to be agitated, in restraints. 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 No pertinent surgical history.    Review of patient's allergies indicates:  No Known Allergies    Medications:  Continuous Infusions:   sodium chloride 0.9% 100 mL/hr at 05/30/22 1925    dexmedetomidine (PRECEDEX) infusion       Scheduled Meds:   mupirocin   Nasal BID    pantoprazole  40 mg Intravenous BID    sodium chloride 0.9%  1,000 mL Intravenous Once     PRN Meds:sodium chloride    Family History    None       Tobacco Use    Smoking status: Not on file    Smokeless tobacco: Not on file   Substance and Sexual Activity    Alcohol use: Not on file    Drug use: Not on file    Sexual activity: Not on file       Review of Systems   Unable to perform ROS: Acuity of condition   Objective:     Vital Signs (Most Recent):  Temp: 98 °F (36.7 °C) (05/31/22 1215)  Pulse: 86 (05/31/22 1215)  Resp: (!) 21 (05/31/22 1215)  BP: 103/61 (05/31/22 1215)  SpO2: 100 % (05/31/22 1215)   Vital Signs (24h Range):  Temp:  [97.6 °F (36.4 °C)-99.3 °F (37.4 °C)] 98 °F (36.7 °C)  Pulse:  [] 86  Resp:  [18-39] 21  SpO2:  [96 %-100 %] 100 %  BP: (102-143)/(57-85) 103/61     Weight: 77.5 kg (170 lb 13.7 oz)  Body mass index is 24.52 kg/m².    Physical Exam  Vitals and nursing note reviewed.   Constitutional:       General: He is in acute distress.      Appearance: He is ill-appearing. He is not toxic-appearing or diaphoretic.      Comments: In restraints, agitated   HENT:      Mouth/Throat:      Mouth: Mucous membranes are dry.   Cardiovascular:      Rate and Rhythm: Tachycardia present.   Pulmonary:      Effort: Pulmonary effort is normal.   Abdominal:      General: Abdomen is flat. Bowel sounds are normal. There is no distension.      Palpations: Abdomen is soft.   Musculoskeletal:       Right lower leg: No edema.      Left lower leg: No edema.   Skin:     General: Skin is warm and dry.   Neurological:      Comments: On sedation but remains agitated and moving extremities    Psychiatric:      Comments: Unable to assess due to mental status change and sedation        Review of Symptoms      Symptom Assessment (ESAS 0-10 Scale)  Unable to complete assessment due to Acuity of condition       Pain Assessment in Advanced Demential Scale (PAINAD)   Breathing - Independent of vocalization:  0  Negative vocalization:  2  Facial expression:  2  Body language:  2  Consolability:  2  Total:  8    Advance Care Planning   Advance Care Planning       Significant Labs: All pertinent labs within the past 24 hours have been reviewed.  CBC:   Recent Labs   Lab 05/31/22  0754   WBC 14.55*   HGB 6.4*   HCT 21.9*   MCV 80*        BMP:  Recent Labs   Lab 05/31/22  0218   *      K 4.9   *   CO2 18*   BUN 52*   CREATININE 1.1   CALCIUM 7.8*     LFT:  Lab Results   Component Value Date    AST 15 05/31/2022    ALKPHOS 67 05/31/2022    BILITOT 2.2 (H) 05/31/2022     Albumin:   Albumin   Date Value Ref Range Status   05/31/2022 2.6 (L) 3.5 - 5.2 g/dL Final     Protein:   Total Protein   Date Value Ref Range Status   05/31/2022 4.9 (L) 6.0 - 8.4 g/dL Final     Lactic acid:   No results found for: LACTATE    Significant Imaging: I have reviewed all pertinent imaging results/findings within the past 24 hours. CT head

## 2022-05-31 NOTE — PROVATION PATIENT INSTRUCTIONS
Discharge Summary/Instructions after an Endoscopic Procedure  Patient Name: Rayo Duran  Patient MRN: [id number]  Patient YOB: 1935  Tuesday, May 31, 2022  Mario Esclaera MD  Dear patient,  As a result of recent federal legislation (The Federal Cures Act), you may   receive lab or pathology results from your procedure in your [facility]   account before your physician is able to contact you. Your physician or   their representative will relay the results to you with their   recommendations at their soonest availability.  Thank you,  RESTRICTIONS:  During your procedure today, you received medications for sedation.  These   medications may affect your judgment, balance and coordination.  Therefore,   for 24 hours, you have the following restrictions:   - DO NOT drive a car, operate machinery, make legal/financial decisions,   sign important papers or drink alcohol.    ACTIVITY:  Today: no heavy lifting, straining or running due to procedural   sedation/anesthesia.  The following day: return to full activity including work.  DIET:  Eat and drink normally unless instructed otherwise.     TREATMENT FOR COMMON SIDE EFFECTS:  - Mild abdominal pain, nausea, belching, bloating or excessive gas:  rest,   eat lightly and use a heating pad.  - Sore Throat: treat with throat lozenges and/or gargle with warm salt   water.  - Because air was used during the procedure, expelling large amounts of air   from your rectum or belching is normal.  - If a bowel prep was taken, you may not have a bowel movement for 1-3 days.    This is normal.  SYMPTOMS TO WATCH FOR AND REPORT TO YOUR PHYSICIAN:  1. Abdominal pain or bloating, other than gas cramps.  2. Chest pain.  3. Back pain.  4. Signs of infection such as: chills or fever occurring within 24 hours   after the procedure.  5. Rectal bleeding, which would show as bright red, maroon, or black stools.   (A tablespoon of blood from the rectum is not serious, especially if    hemorrhoids are present.)  6. Vomiting.  7. Weakness or dizziness.  GO DIRECTLY TO THE NEAREST EMERGENCY ROOM IF YOU HAVE ANY OF THE FOLLOWING:      Difficulty breathing              Chills and/or fever over 101 F   Persistent vomiting and/or vomiting blood   Severe abdominal pain   Severe chest pain   Black, tarry stools   Bleeding- more than one tablespoon   Any other symptom or condition that you feel may need urgent attention  Your doctor recommends these additional instructions:  If any biopsies were taken, your doctors clinic will contact you in 1 to 2   weeks with any results.  - Return patient to hospital leong for ongoing care.   - Clear liquid diet.   - Continue present medications.   - Await pathology results.   - Repeat upper endoscopy in 4 weeks to check healing.  For questions, problems or results please call your physician - Mario Escalera MD at Work:  [phone].  [facility] can be reached at [phone]     IF A COMPLICATION OR EMERGENCY SITUATION ARISES AND YOU ARE UNABLE TO REACH   YOUR PHYSICIAN - GO DIRECTLY TO THE EMERGENCY ROOM.  Mario Escalera MD  5/31/2022 3:57:39 PM  This report has been verified and signed electronically.  Dear patient,  As a result of recent federal legislation (The Federal Cures Act), you may   receive lab or pathology results from your procedure in your [facility]   account before your physician is able to contact you. Your physician or   their representative will relay the results to you with their   recommendations at their soonest availability.  Thank you,  PROVATION

## 2022-05-31 NOTE — NURSING
Patients heart rate noted to be 130-145 on monitor. Rapid response nurseRonnie at bedside to access patient. Stat EKG obtained. Orders received by Dr. Van for 1 L NS bolus. Bolus initiated. Patient restless, in 2 point restraints. Placed patient on 3L N/C for comfort. Calming voice utilized to attempt to re-direct patient. Family at bedside. Patient continued to kick feet, hit foot on the foot of the bed and attempt to exit the bed despite myself, rapid nurse and family at bedside. Patient placed in 4 point restraints to prevent injury to self.     New orders received to transfer patient to ICU. Patient prepared for transport.

## 2022-05-31 NOTE — PLAN OF CARE
Pt transported to ICU via bed with TRAM Lozada. Monitors attached, pt on Non-rebreather no distress noted.  Bedside report given to Jolie ICU nurse. Pts family at bedside.

## 2022-05-31 NOTE — RESPIRATORY THERAPY
Latest Reference Range & Units 05/31/22 03:22   POC PH 7.35 - 7.45  7.417   POC PCO2 35 - 45 mmHg 26.8 (LL)   POC PO2 80 - 100 mmHg 75 (L)   POC BE -2 to 2 mmol/L -7   POC HCO3 24 - 28 mmol/L 17.3 (L)   POC SATURATED O2 95 - 100 % 95   POC TCO2 23 - 27 mmol/L 18 (L)   Sample  ARTERIAL   Allens Test  Pass   Site  RR   (L): Data is abnormally low  (LL): Data is critically low      Results reported to Dr Patiño.

## 2022-06-01 PROBLEM — Z98.890 HISTORY OF ABDOMINAL SURGERY: Status: ACTIVE | Noted: 2022-06-01

## 2022-06-01 LAB
ALBUMIN SERPL BCP-MCNC: 2.3 G/DL (ref 3.5–5.2)
ALP SERPL-CCNC: 61 U/L (ref 55–135)
ALT SERPL W/O P-5'-P-CCNC: 15 U/L (ref 10–44)
ANION GAP SERPL CALC-SCNC: 5 MMOL/L (ref 8–16)
AST SERPL-CCNC: 31 U/L (ref 10–40)
BACTERIA #/AREA URNS HPF: ABNORMAL /HPF
BILIRUB SERPL-MCNC: 2.4 MG/DL (ref 0.1–1)
BILIRUB UR QL STRIP: NEGATIVE
BUN SERPL-MCNC: 48 MG/DL (ref 8–23)
CALCIUM SERPL-MCNC: 7.5 MG/DL (ref 8.7–10.5)
CHLORIDE SERPL-SCNC: 121 MMOL/L (ref 95–110)
CLARITY UR: CLEAR
CO2 SERPL-SCNC: 20 MMOL/L (ref 23–29)
COLOR UR: YELLOW
CREAT SERPL-MCNC: 1 MG/DL (ref 0.5–1.4)
ERYTHROCYTE [DISTWIDTH] IN BLOOD BY AUTOMATED COUNT: 17.2 % (ref 11.5–14.5)
EST. GFR  (AFRICAN AMERICAN): >60 ML/MIN/1.73 M^2
EST. GFR  (NON AFRICAN AMERICAN): >60 ML/MIN/1.73 M^2
GLUCOSE SERPL-MCNC: 87 MG/DL (ref 70–110)
GLUCOSE UR QL STRIP: NEGATIVE
HCT VFR BLD AUTO: 26 % (ref 40–54)
HGB BLD-MCNC: 7.7 G/DL (ref 14–18)
HGB UR QL STRIP: ABNORMAL
KETONES UR QL STRIP: NEGATIVE
LEUKOCYTE ESTERASE UR QL STRIP: ABNORMAL
MAGNESIUM SERPL-MCNC: 2 MG/DL (ref 1.6–2.6)
MCH RBC QN AUTO: 25.5 PG (ref 27–31)
MCHC RBC AUTO-ENTMCNC: 29.6 G/DL (ref 32–36)
MCV RBC AUTO: 86 FL (ref 82–98)
MICROSCOPIC COMMENT: ABNORMAL
NITRITE UR QL STRIP: NEGATIVE
PH UR STRIP: 5 [PH] (ref 5–8)
PHOSPHATE SERPL-MCNC: 2.4 MG/DL (ref 2.7–4.5)
PLATELET # BLD AUTO: 185 K/UL (ref 150–450)
PMV BLD AUTO: 10.7 FL (ref 9.2–12.9)
POCT GLUCOSE: 101 MG/DL (ref 70–110)
POCT GLUCOSE: 116 MG/DL (ref 70–110)
POCT GLUCOSE: 86 MG/DL (ref 70–110)
POTASSIUM SERPL-SCNC: 4.1 MMOL/L (ref 3.5–5.1)
PROT SERPL-MCNC: 4.4 G/DL (ref 6–8.4)
PROT UR QL STRIP: NEGATIVE
RBC # BLD AUTO: 3.02 M/UL (ref 4.6–6.2)
RBC #/AREA URNS HPF: 14 /HPF (ref 0–4)
SODIUM SERPL-SCNC: 146 MMOL/L (ref 136–145)
SP GR UR STRIP: 1.02 (ref 1–1.03)
URN SPEC COLLECT METH UR: ABNORMAL
UROBILINOGEN UR STRIP-ACNC: NEGATIVE EU/DL
WBC # BLD AUTO: 8.45 K/UL (ref 3.9–12.7)
WBC #/AREA URNS HPF: 2 /HPF (ref 0–5)

## 2022-06-01 PROCEDURE — 80053 COMPREHEN METABOLIC PANEL: CPT | Performed by: INTERNAL MEDICINE

## 2022-06-01 PROCEDURE — 83735 ASSAY OF MAGNESIUM: CPT | Performed by: INTERNAL MEDICINE

## 2022-06-01 PROCEDURE — 99233 PR SUBSEQUENT HOSPITAL CARE,LEVL III: ICD-10-PCS | Mod: ,,, | Performed by: NURSE PRACTITIONER

## 2022-06-01 PROCEDURE — 85027 COMPLETE CBC AUTOMATED: CPT | Performed by: SURGERY

## 2022-06-01 PROCEDURE — 25000003 PHARM REV CODE 250: Performed by: INTERNAL MEDICINE

## 2022-06-01 PROCEDURE — 25000003 PHARM REV CODE 250: Performed by: SURGERY

## 2022-06-01 PROCEDURE — 63600175 PHARM REV CODE 636 W HCPCS: Performed by: EMERGENCY MEDICINE

## 2022-06-01 PROCEDURE — 99233 SBSQ HOSP IP/OBS HIGH 50: CPT | Mod: ,,, | Performed by: NURSE PRACTITIONER

## 2022-06-01 PROCEDURE — 99232 PR SUBSEQUENT HOSPITAL CARE,LEVL II: ICD-10-PCS | Mod: ,,, | Performed by: INTERNAL MEDICINE

## 2022-06-01 PROCEDURE — C9113 INJ PANTOPRAZOLE SODIUM, VIA: HCPCS | Performed by: EMERGENCY MEDICINE

## 2022-06-01 PROCEDURE — 36415 COLL VENOUS BLD VENIPUNCTURE: CPT | Performed by: INTERNAL MEDICINE

## 2022-06-01 PROCEDURE — 20000000 HC ICU ROOM

## 2022-06-01 PROCEDURE — 99232 SBSQ HOSP IP/OBS MODERATE 35: CPT | Mod: ,,, | Performed by: INTERNAL MEDICINE

## 2022-06-01 PROCEDURE — 84100 ASSAY OF PHOSPHORUS: CPT | Performed by: INTERNAL MEDICINE

## 2022-06-01 PROCEDURE — 99497 ADVNCD CARE PLAN 30 MIN: CPT | Mod: ,,, | Performed by: NURSE PRACTITIONER

## 2022-06-01 PROCEDURE — 81000 URINALYSIS NONAUTO W/SCOPE: CPT | Performed by: EMERGENCY MEDICINE

## 2022-06-01 PROCEDURE — 99497 PR ADVNCD CARE PLAN 30 MIN: ICD-10-PCS | Mod: ,,, | Performed by: NURSE PRACTITIONER

## 2022-06-01 PROCEDURE — 94761 N-INVAS EAR/PLS OXIMETRY MLT: CPT

## 2022-06-01 RX ORDER — NOREPINEPHRINE BITARTRATE/D5W 4MG/250ML
0-3 PLASTIC BAG, INJECTION (ML) INTRAVENOUS
Status: DISCONTINUED | OUTPATIENT
Start: 2022-06-01 | End: 2022-06-02

## 2022-06-01 RX ORDER — DEXTROSE MONOHYDRATE 50 MG/ML
INJECTION, SOLUTION INTRAVENOUS CONTINUOUS
Status: DISCONTINUED | OUTPATIENT
Start: 2022-06-01 | End: 2022-06-01

## 2022-06-01 RX ORDER — DEXTROSE MONOHYDRATE 50 MG/ML
INJECTION, SOLUTION INTRAVENOUS CONTINUOUS
Status: ACTIVE | OUTPATIENT
Start: 2022-06-01 | End: 2022-06-01

## 2022-06-01 RX ADMIN — PANTOPRAZOLE SODIUM 40 MG: 40 INJECTION, POWDER, FOR SOLUTION INTRAVENOUS at 09:06

## 2022-06-01 RX ADMIN — DEXTROSE: 5 SOLUTION INTRAVENOUS at 09:06

## 2022-06-01 RX ADMIN — MUPIROCIN: 20 OINTMENT TOPICAL at 09:06

## 2022-06-01 RX ADMIN — DEXTROSE: 5 SOLUTION INTRAVENOUS at 07:06

## 2022-06-01 RX ADMIN — SODIUM CHLORIDE 500 ML: 0.9 INJECTION, SOLUTION INTRAVENOUS at 05:06

## 2022-06-01 NOTE — PLAN OF CARE
Problem: Adult Inpatient Plan of Care  Goal: Plan of Care Review  Outcome: Ongoing, Not Progressing  Goal: Patient-Specific Goal (Individualized)  Outcome: Ongoing, Not Progressing  Goal: Absence of Hospital-Acquired Illness or Injury  Outcome: Ongoing, Not Progressing  Goal: Optimal Comfort and Wellbeing  Outcome: Ongoing, Not Progressing  Goal: Readiness for Transition of Care  Outcome: Ongoing, Not Progressing     Problem: Fall Injury Risk  Goal: Absence of Fall and Fall-Related Injury  Outcome: Ongoing, Not Progressing     Problem: Adjustment to Illness (Gastrointestinal Bleeding)  Goal: Optimal Coping with Acute Illness  Outcome: Ongoing, Not Progressing     Problem: Bleeding (Gastrointestinal Bleeding)  Goal: Hemostasis  Outcome: Ongoing, Not Progressing     Problem: Restraint, Nonbehavioral (Nonviolent)  Goal: Absence of Harm or Injury  Outcome: Ongoing, Not Progressing     Problem: Infection  Goal: Absence of Infection Signs and Symptoms  Outcome: Ongoing, Not Progressing     Problem: Coping Ineffective  Goal: Effective Coping  Outcome: Ongoing, Not Progressing     Problem: Skin Injury Risk Increased  Goal: Skin Health and Integrity  Outcome: Ongoing, Not Progressing

## 2022-06-01 NOTE — ASSESSMENT & PLAN NOTE
Resolved. Delirium precautions.   I opened curtains. Family is at bedside.  PT/OT in AM if no need for vasopressors. Will remove morrow

## 2022-06-01 NOTE — PLAN OF CARE
[facility]ensive [facility]  Initial Discharge Assessment       Primary Care Provider: Caroline Florez MD    Admission Diagnosis: GI bleed [K92.2]    Admission Date: 5/30/2022  Expected Discharge Date:     Discharge Barriers Identified: None    Payor: MEDICARE / Plan: MEDICARE PART A & B / Product Type: Government /     Extended Emergency Contact Information  Primary Emergency Contact: JANIA REYNA  Mobile Phone: [phone]  Relation: Spouse  Preferred language: English    Discharge Plan A: Home Health  Discharge Plan B: Home with family      [address] - [address] [facility]  [address] [facility] [address]  Phone: [phone] Fax: [phone]      Initial Assessment (most recent)       Adult Discharge Assessment - 05/31/22 1942          Discharge Assessment    Assessment Type Discharge Planning Assessment     Confirmed/corrected address, phone number and insurance Yes     Confirmed Demographics Correct on Facesheet     Source of Information family     If unable to respond/provide information was family/caregiver contacted? Yes     Contact Name/Number Jania Duran;  spouse;  [phone]     When was your last doctors appointment? 03/10/22     Communicated PHILLIP with patient/caregiver Date not available/Unable to determine     Reason For Admission GI Bleed     Lives With spouse     Facility Arrived From: Transferred from [facility] in [place]     Do you expect to return to your current living situation? Yes     Do you have help at home or someone to help you manage your care at home? Yes     Who are your caregiver(s) and their phone number(s)? wife; Jania Duran  [phone]     Prior to hospitilization cognitive status: Alert/Oriented     Current cognitive status: Unable to Assess   Asleep    Walking or Climbing Stairs Difficulty none     Dressing/Bathing Difficulty none     Equipment Currently Used at Home none     Readmission within 30 days? No     Patient currently  being followed by outpatient case management? No     Do you currently have service(s) that help you manage your care at home? No     Do you take prescription medications? Yes     Do you have prescription coverage? Yes     Coverage Medicare     Do you have any problems affording any of your prescribed medications? No     Is the patient taking medications as prescribed? yes     Who is going to help you get home at discharge? TBD     How do you get to doctors appointments? car, drives self     Are you on dialysis? No     Do you take coumadin? No     Discharge Plan A Home Health     Discharge Plan B Home with family     DME Needed Upon Discharge  other (see comments)   TBD    Discharge Plan discussed with: Spouse/sig other     Name(s) and Number(s) Jania Duran; spouse;  [phone]     Discharge Barriers Identified None        Relationship/Environment    Name(s) of Who Lives With Patient spouse;  Jania Duran                     Lives in [place], [place].  Lives with spouse and was independent prior to admit.  No DME or OP services.  Will need PCP fu.  Might benefit from home health.

## 2022-06-01 NOTE — EICU
eICU Physician Virtual/Remote Brief Evaluation Note      Message from RN  Blood pressure trending down.  88/51 (64).  Precedex drip off since 20/3 100  Chart reviewed, patient observed, discussed with RN  /58 (74), P 73, RR 25, O2 sat 96  No CBC drawn this morning  Also phos 2.4  Lungs clear  Bolus 500 mL normal saline  Does not meet criteria for phosphate replacement  Stat CBC      JACEY Stahl MD  [facility] Attending  [phone]    This report has been created through the use of M-Interview[employer] dictation software. Typographical and content errors may occur with this process. While efforts are made to detect and correct such errors, in some cases errors will persist. For this reason, wording in this document should be considered in the proper context and not strictly verbatim

## 2022-06-01 NOTE — CARE UPDATE
[facility] Intensive [facility]  ICU Shift Summary  Date: 5/31/2022      Prehospitalization: Home  Admit Date / LOS : 5/30/2022/ 1 days    Diagnosis: Acute blood loss anemia    Consults:        Active: GI       Needed: N/A     Code Status: Full Code   Advanced Directive: <no information>    LDA:  Lines/Drains/Airways     Drain  Duration                Urethral Catheter 05/31/22 1012 16 Fr. <1 day          Peripheral Intravenous Line  Duration                Peripheral IV - Single Lumen 05/30/22 0614 18 G Right Antecubital 1 day         Peripheral IV - Single Lumen 05/31/22 1010 20 G Left;Posterior Hand <1 day         Peripheral IV - Single Lumen 05/31/22 1011 20 G Anterior;Left Forearm <1 day              Central Lines/Site/Justification:Multiple GTTS  Urinary Cath/Order/Justification:Critically Ill in the ICU and requiring intensive monitoring    Vasopressors/Infusions:    sodium chloride 0.9% 100 mL/hr at 05/31/22 1741    dexmedetomidine (PRECEDEX) infusion 0.3 mcg/kg/hr (05/31/22 1624)          GOALS: Volume/ Hemodynamic: N/A                     RASS: +4 combative, violent, danger to staff    Pain Management: none       Pain Controlled: not applicable     Rhythm: NSR    Respiratory Device: Nasal Cannula                  Most Recent SBT/ SAT: N/A       MOVE Screen: PASS  ICU Liberation: not applicable    VTE Prophylaxis: Mechanical  Mobility: Bedrest  Stress Ulcer Prophylaxis: No    Isolation: No active isolations    Dietary:   Current Diet Order   Procedures    Diet NPO      Tolerance: not applicable  Advancement: no    I & O (24h):    Intake/Output Summary (Last 24 hours) at 5/31/2022 1918  Last data filed at 5/31/2022 1800  Gross per 24 hour   Intake 2666.1 ml   Output 1100 ml   Net 1566.1 ml        Restraints: Yes    Significant Dates:  Post Op Date: N/A  Rescue Date: N/A  Imaging/ Diagnostics: N/A    Noteworthy Labs:  none    COVID Test: (--)  CBC/Anemia Labs: Coags:    Recent Labs   Lab 05/30/22  0604  05/30/22  0609 05/31/22  0754 05/31/22  1456   WBC  --    < > 14.55* 10.32   HGB  --    < > 6.4* 7.8*   HCT  --    < > 21.9* 25.2*   PLT  --    < > 274 183   MCV  --    < > 80* 83   RDW  --    < > 18.3* 17.2*   OCCULTBLOOD Positive*  --   --   --     < > = values in this interval not displayed.    Recent Labs   Lab 05/30/22  0610   INR 1.1   APTT <21.0        Chemistries:   Recent Labs   Lab 05/30/22  0609 05/31/22  0218    141   K 4.5 4.9   * 116*   CO2 20* 18*   BUN 42* 52*   CREATININE 1.3 1.1   CALCIUM 7.3* 7.8*   PROT 5.3* 4.9*   BILITOT 0.9 2.2*   ALKPHOS 84 67   ALT 13 9*   AST 11 15        Cardiac Enzymes: Ejection Fractions:    No results for input(s): CPK, CPKMB, MB, TROPONINI in the last 72 hours. No results found for: EF     POCT Glucose: HbA1c:    Recent Labs   Lab 05/31/22  0005   POCTGLUCOSE 114*    No results found for: HGBA1C        ICU LOS 10h  Level of Care: Critical Care    Chart Check: 12 HR Done  Shift Summary/Plan for the shift:     Pt remains in ICU on Precedex and 3L NC. VS WNL. Wife at bedside and  updated on plan of care. No falls, injuries, or further skin breakdown.

## 2022-06-01 NOTE — NURSING
[facility] Intensive [facility]  ICU Shift Summary  Date: 6/1/2022      Prehospitalization: Home  Admit Date / LOS : 5/30/2022/ 2 days    Diagnosis: Acute blood loss anemia    Consults:        Active: GI and Palliative       Needed: N/A     Code Status: Full Code   Advanced Directive: <no information>    LDA:  Lines/Drains/Airways     Peripheral Intravenous Line  Duration                Peripheral IV - Single Lumen 05/30/22 0614 18 G Right Antecubital 2 days         Peripheral IV - Single Lumen 05/31/22 1010 20 G Left;Posterior Hand 1 day         Peripheral IV - Single Lumen 05/31/22 1011 20 G Anterior;Left Forearm 1 day              Central Lines/Site/Justification:Patient Does Not Have Central Line  Urinary Cath/Order/Justification:Patient Does Not Have Urinary Catheter    Vasopressors/Infusions:    dextrose 5 % 100 mL/hr at 06/01/22 1600          GOALS: Volume/ Hemodynamic: N/A                     RASS: 0  alert and calm    Pain Management: none       Pain Controlled: not applicable     Rhythm: NSR    Respiratory Device: Room Air                  Most Recent SBT/ SAT: N/A       MOVE Screen: PASS  ICU Liberation: not applicable    VTE Prophylaxis: Pharm  Mobility: Bedrest  Stress Ulcer Prophylaxis: Yes    Isolation: No active isolations    Dietary:   Current Diet Order   Procedures    Diet Adult Regular (IDDSI Level 7)      Tolerance: not applicable  Advancement: no    I & O (24h):    Intake/Output Summary (Last 24 hours) at 6/1/2022 1654  Last data filed at 6/1/2022 1600  Gross per 24 hour   Intake 2748.54 ml   Output 1375 ml   Net 1373.54 ml        Restraints: No    Significant Dates:  Post Op Date: N/A  Rescue Date: N/A  Imaging/ Diagnostics: N/A    Noteworthy Labs:  See labs    COVID Test: (--)  CBC/Anemia Labs: Coags:    Recent Labs   Lab 05/30/22  0604 05/30/22  0609 05/31/22  1456 06/01/22  0413   WBC  --    < > 10.32 8.45   HGB  --    < > 7.8* 7.7*   HCT  --    < > 25.2* 26.0*   PLT  --    < > 183 185   MCV   --    < > 83 86   RDW  --    < > 17.2* 17.2*   OCCULTBLOOD Positive*  --   --   --     < > = values in this interval not displayed.    Recent Labs   Lab 05/30/22  0610   INR 1.1   APTT <21.0        Chemistries:   Recent Labs   Lab 05/31/22  0218 06/01/22  0231    146*   K 4.9 4.1   * 121*   CO2 18* 20*   BUN 52* 48*   CREATININE 1.1 1.0   CALCIUM 7.8* 7.5*   PROT 4.9* 4.4*   BILITOT 2.2* 2.4*   ALKPHOS 67 61   ALT 9* 15   AST 15 31   MG  --  2.0   PHOS  --  2.4*        Cardiac Enzymes: Ejection Fractions:    No results for input(s): CPK, CPKMB, MB, TROPONINI in the last 72 hours. No results found for: EF     POCT Glucose: HbA1c:    Recent Labs   Lab 05/31/22  0005 06/01/22  0741 06/01/22  1137   POCTGLUCOSE 114* 86 101    No results found for: HGBA1C        ICU LOS 1d 7h  Level of Care: OK to Transfer    Chart Check: 24 HR Done  Shift Summary/Plan for the shift: see care plan note

## 2022-06-01 NOTE — PROGRESS NOTES
[facility] Medicine  Progress Note    Patient Name: Rayo Duran Sr.  MRN: [id number]  Patient Class: IP- Inpatient   Admission Date: 5/30/2022  Length of Stay: 2 days  Attending Physician: Tonie Rivera MD  Primary Care Provider: Caroline Florez MD        Subjective:     Principal Problem:Acute blood loss anemia        HPI:  Mr. Duran is a 85 yo M who presents with a CC of vomiting black material for 2 days.  Patient presented to an ED in [place]- and was transferred here for evaluation of a likely upper GI bleed.  Patient found to have a Hgb of 6.4.  Patient takes Alkaseltser several times a week. No abdominal pain. No history of GI bleed.  Patient received 2 units of blood. Otherwise- he has no complaints. He is on no medications.  Only med issue is hypertension- but he states he does not take any meds for this. He is non-ill appearing and joking around     - David Masterson MD      Overview/Hospital Course:  Mr Duran presented with acute blood loss anemia secondary to GI bleed. Developed hyperactive delirium. Received a dose of lorazepam overnight with paradoxical effect. Very delirious and hyperactive. Is in 4 point restraints. Also with bright red blood per rectum, per nursing report. Two units of blood ordered. Transferred to ICU for precedex infusion. Transfused 2 U of PRBCs. Underwent EGD:    - Normal esophagus.   - A Billroth II anastomosis was found, characterized by ulceration.   - Spurting gastric ulcer with a visible vessel. Clips were placed. Ulcerated mass as the bleeding cource. Biopsied.   - Clotted blood in the gastric body. Fluid aspiration performed.     Weaned off precedex and was AAO x 3. Able to participate in exam and felt well. BP on low end of normal. Got extra IVFs. PICC to be placed in case vasopressor support required. Started clear liquid diet.       Interval History: is AAO x 3 today. Hgb has been stable in the last two draws. BP lower side.  Improved with fluids but still on lower end of normal. Patient has no complaints currently    Review of Systems   Constitutional: Negative.    Respiratory: Negative.     Cardiovascular: Negative.    Gastrointestinal: Negative.    Objective:     Vital Signs (Most Recent):  Temp: 96.4 °F (35.8 °C) (06/01/22 0745)  Pulse: 82 (06/01/22 0800)  Resp: 19 (06/01/22 0800)  BP: 111/60 (06/01/22 0800)  SpO2: 99 % (06/01/22 0800) Vital Signs (24h Range):  Temp:  [96.4 °F (35.8 °C)-98.9 °F (37.2 °C)] 96.4 °F (35.8 °C)  Pulse:  [] 82  Resp:  [16-39] 19  SpO2:  [70 %-100 %] 99 %  BP: ()/(50-75) 111/60     Weight: 77.5 kg (170 lb 13.7 oz)  Body mass index is 24.52 kg/m².    Intake/Output Summary (Last 24 hours) at 6/1/2022 0848  Last data filed at 6/1/2022 0800  Gross per 24 hour   Intake 4615.95 ml   Output 1750 ml   Net 2865.95 ml      Physical Exam  Vitals and nursing note reviewed.   Constitutional:       General: He is not in acute distress.     Appearance: He is not toxic-appearing.   Cardiovascular:      Rate and Rhythm: Normal rate and regular rhythm.   Pulmonary:      Effort: Pulmonary effort is normal.      Breath sounds: No wheezing or rales.   Abdominal:      General: Bowel sounds are normal. There is no distension.      Palpations: Abdomen is soft.      Tenderness: There is no abdominal tenderness.   Musculoskeletal:      Right lower leg: No edema.      Left lower leg: No edema.   Skin:     Capillary Refill: Capillary refill takes less than 2 seconds.      Comments: Healed midline scar to abdomen    Neurological:      Mental Status: He is alert and oriented to person, place, and time.   Psychiatric:         Mood and Affect: Mood normal.         Behavior: Behavior normal.       Significant Labs: All pertinent labs within the past 24 hours have been reviewed.    Significant Imaging: I have reviewed all pertinent imaging results/findings within the past 24 hours.  I have reviewed and interpreted all pertinent  "imaging results/findings within the past 24 hours.      Assessment/Plan:      * Acute blood loss anemia  Wife and son report he is not on anticoagulants or antiplatelets   Has rivaroxaban listed in his home med list. Not sure what for  Patient clarified that he had "stomach surgery" 40 yrs ago for a mass.  S/p 2 U of blood yest. Hgb low but stable and does not require a transfusion now  Will recheck every 8 hours for now. Give extra IVFs for low BP. Vasopressor support PRN.   PICC consulted for central access  Pantoprazole IV BID for gastric ulcer with bleeding and ulcerated mass seen on EGD.   Gastroenterology following. Start clear liquid diet      History of abdominal surgery  States he has "stomach surgery" about 40 yrs ago for a mass      Delirium  Resolved. Delirium precautions.   I opened curtains. Family is at bedside.  PT/OT in AM if no need for vasopressors. Will remove morrow        Gastrointestinal bleed  As above  Hold off on antiplatelets and anticoagulants       HTN (hypertension), benign  BP not high. Hold off on antihypertensives      VTE Risk Mitigation (From admission, onward)         Ordered     IP VTE HIGH RISK PATIENT  Once         05/30/22 1701     Place sequential compression device  Until discontinued         05/30/22 1701                Discharge Planning   PHILLIP:      Code Status: Full Code   Is the patient medically ready for discharge?:     Reason for patient still in hospital (select all that apply): Treatment  Discharge Plan A: Home Health        Discussed with patient and son at bedside. Questions answered.     Critical care time spent on the evaluation and treatment of severe organ dysfunction, review of pertinent labs and imaging studies, discussions with consulting providers and discussions with patient/family: > 35 minutes.      Tonie Van MD  Department of Hospital Medicine   [facility] - Intensive Care    "

## 2022-06-01 NOTE — NURSING
Non-violent restraints discontinued safely. Pt and wife verbalized understanding of removal of restraints.

## 2022-06-01 NOTE — SUBJECTIVE & OBJECTIVE
Interval History: is AAO x 3 today. Hgb has been stable in the last two draws. BP lower side. Improved with fluids but still on lower end of normal. Patient has no complaints currently    Review of Systems   Constitutional: Negative.    Respiratory: Negative.     Cardiovascular: Negative.    Gastrointestinal: Negative.    Objective:     Vital Signs (Most Recent):  Temp: 96.4 °F (35.8 °C) (06/01/22 0745)  Pulse: 82 (06/01/22 0800)  Resp: 19 (06/01/22 0800)  BP: 111/60 (06/01/22 0800)  SpO2: 99 % (06/01/22 0800) Vital Signs (24h Range):  Temp:  [96.4 °F (35.8 °C)-98.9 °F (37.2 °C)] 96.4 °F (35.8 °C)  Pulse:  [] 82  Resp:  [16-39] 19  SpO2:  [70 %-100 %] 99 %  BP: ()/(50-75) 111/60     Weight: 77.5 kg (170 lb 13.7 oz)  Body mass index is 24.52 kg/m².    Intake/Output Summary (Last 24 hours) at 6/1/2022 0848  Last data filed at 6/1/2022 0800  Gross per 24 hour   Intake 4615.95 ml   Output 1750 ml   Net 2865.95 ml      Physical Exam  Vitals and nursing note reviewed.   Constitutional:       General: He is not in acute distress.     Appearance: He is not toxic-appearing.   Cardiovascular:      Rate and Rhythm: Normal rate and regular rhythm.   Pulmonary:      Effort: Pulmonary effort is normal.      Breath sounds: No wheezing or rales.   Abdominal:      General: Bowel sounds are normal. There is no distension.      Palpations: Abdomen is soft.      Tenderness: There is no abdominal tenderness.   Musculoskeletal:      Right lower leg: No edema.      Left lower leg: No edema.   Skin:     Capillary Refill: Capillary refill takes less than 2 seconds.      Comments: Healed midline scar to abdomen    Neurological:      Mental Status: He is alert and oriented to person, place, and time.   Psychiatric:         Mood and Affect: Mood normal.         Behavior: Behavior normal.       Significant Labs: All pertinent labs within the past 24 hours have been reviewed.    Significant Imaging: I have reviewed all pertinent  imaging results/findings within the past 24 hours.  I have reviewed and interpreted all pertinent imaging results/findings within the past 24 hours.

## 2022-06-01 NOTE — PLAN OF CARE
Pt AAO x4. BP low normal, MAP >65, vitals otherwise stable. No episodes of emesis or stools this shift. Tolerating clear liquid without difficulty. No fall, injury, or skin breakdown this shift.    Problem: Adult Inpatient Plan of Care  Goal: Plan of Care Review  Outcome: Ongoing, Progressing  Goal: Patient-Specific Goal (Individualized)  Outcome: Ongoing, Progressing  Goal: Absence of Hospital-Acquired Illness or Injury  Outcome: Ongoing, Progressing  Goal: Optimal Comfort and Wellbeing  Outcome: Ongoing, Progressing  Goal: Readiness for Transition of Care  Outcome: Ongoing, Progressing     Problem: Fall Injury Risk  Goal: Absence of Fall and Fall-Related Injury  Outcome: Ongoing, Progressing     Problem: Adjustment to Illness (Gastrointestinal Bleeding)  Goal: Optimal Coping with Acute Illness  Outcome: Ongoing, Progressing     Problem: Bleeding (Gastrointestinal Bleeding)  Goal: Hemostasis  Outcome: Ongoing, Progressing     Problem: Restraint, Nonbehavioral (Nonviolent)  Goal: Absence of Harm or Injury  Outcome: Ongoing, Progressing     Problem: Infection  Goal: Absence of Infection Signs and Symptoms  Outcome: Ongoing, Progressing     Problem: Coping Ineffective  Goal: Effective Coping  Outcome: Ongoing, Progressing     Problem: Skin Injury Risk Increased  Goal: Skin Health and Integrity  Outcome: Ongoing, Progressing

## 2022-06-01 NOTE — ASSESSMENT & PLAN NOTE
"Wife and son report he is not on anticoagulants or antiplatelets   Has rivaroxaban listed in his home med list. Not sure what for  Patient clarified that he had "stomach surgery" 40 yrs ago for a mass.  S/p 2 U of blood yest. Hgb low but stable and does not require a transfusion now  Will recheck every 8 hours for now. Give extra IVFs for low BP. Vasopressor support PRN.   PICC consulted for central access  Pantoprazole IV BID for gastric ulcer with bleeding and ulcerated mass seen on EGD.   Gastroenterology following. Start clear liquid diet    "

## 2022-06-01 NOTE — PROGRESS NOTES
[facility] [place] [facility]  Gastroenterology  Progress Note    Patient Name: Rayo Duran Sr.  MRN: [id number]  Admission Date: 5/30/2022  Hospital Length of Stay: 2 days  Code Status: Full Code   Attending Provider: Tonie Rivera MD  Consulting Provider: Mario Escalera MD  Primary Care Physician: Caroline Florez MD  Principal Problem: Acute blood loss anemia    Subjective:     Interval History:  86-year-old gentleman admitted yesterday with upper GI bleed.  EGD revealed active bleeding from an anastomotic ulcer.  Multiple clips were placed and there was initial hemostasis observed.    Overnight the patient has done well.  Last evening immediately when he arrived in the unit from the procedure he passed a bloody stool as anticipated.  He has had no further bowel movements since that time.  He tolerated clear liquid breakfast this morning without any difficulty.  He feels fine.  Denies any lightheadedness or weakness.  No nausea or vomiting.  And again no melena since perhaps 6:00 p.m.    Review of Systems  Objective:     Vital Signs (Most Recent):  Temp: 96.4 °F (35.8 °C) (06/01/22 0745)  Pulse: 82 (06/01/22 0800)  Resp: 19 (06/01/22 0800)  BP: 111/60 (06/01/22 0800)  SpO2: 99 % (06/01/22 0800) Vital Signs (24h Range):  Temp:  [96.4 °F (35.8 °C)-98.9 °F (37.2 °C)] 96.4 °F (35.8 °C)  Pulse:  [] 82  Resp:  [16-39] 19  SpO2:  [70 %-100 %] 99 %  BP: ()/(50-75) 111/60     Weight: 77.5 kg (170 lb 13.7 oz) (05/31/22 1005)  Body mass index is 24.52 kg/m².      Intake/Output Summary (Last 24 hours) at 6/1/2022 0858  Last data filed at 6/1/2022 0800  Gross per 24 hour   Intake 4615.95 ml   Output 1750 ml   Net 2865.95 ml       Lines/Drains/Airways     Drain  Duration                Urethral Catheter 05/31/22 1012 16 Fr. <1 day          Peripheral Intravenous Line  Duration                Peripheral IV - Single Lumen 05/30/22 0614 18 G Right Antecubital 2 days         Peripheral IV - Single Lumen 05/31/22  1010 20 G Left;Posterior Hand <1 day         Peripheral IV - Single Lumen 05/31/22 1011 20 G Anterior;Left Forearm <1 day                Physical Exam    Significant Labs:  CBC:   Recent Labs   Lab 05/31/22  0754 05/31/22  1456 06/01/22  0413   WBC 14.55* 10.32 8.45   HGB 6.4* 7.8* 7.7*   HCT 21.9* 25.2* 26.0*    183 185     Stool C. diff: No results for input(s): CDIFFICILEAN, CDIFFTOX in the last 48 hours.      Significant Imaging:      Assessment/Plan:     Active Diagnoses:    Diagnosis Date Noted POA    PRINCIPAL PROBLEM:  Acute blood loss anemia [D62] 05/30/2022 Yes    Gastrointestinal bleed [K92.2] 05/31/2022 Yes    Delirium [R41.0] 05/31/2022 No    Palliative care encounter [Z51.5] 05/31/2022 Not Applicable    HTN (hypertension), benign [I10] 05/30/2022 Yes      Problems Resolved During this Admission:       Assessment.  Major upper GI bleeding from anastomotic ulcer.  I explained to the patient and the son that it is my expectation that the procedure yesterday resulted in control of the bleeding.  I did explain that that is not always the case and bleeding can persist or recur.  I am pleased that the hemoglobin is stable this morning and that he feels well and there has been no overt sign of continued bleeding.  I also explained that I am concerned about the appearance of the ulcer and that there is at least the possibility that this is a neoplasm and he will need follow-up scope from more biopsies later on.    Recommendation.  I would keep in the unit this morning, give clear liquids only for lunch and then re-evaluate.  If he continues to do well then I think later on this afternoon we can advance his diet and hopefully move him out of the intensive care unit.  I did tell him that in general I would like to watch patients who had a major GI bleeding event for 3 days in the hospital for that is the time.  Where rebleeding can occur    Thank you for your consult. I will follow-up with patient.  Please contact us if you have any additional questions.    Mario Escalera MD  Gastroenterology  [facility] - [place] - Intensive Care

## 2022-06-01 NOTE — PROGRESS NOTES
"[facility] - Intensive Care  Palliative Medicine  Progress Note    Patient Name: Rayo Duran Sr.  MRN: [id number]  Admission Date: 2022  Hospital Length of Stay: 2 days  Code Status: Full Code   Attending Provider: Tonie Rivera MD  Consulting Provider: Janette Fitzpatrick NP  Primary Care Physician: Caroline Florez MD  Principal Problem:Acute blood loss anemia      Assessment/Plan:   Palliative encounter:    -Discussed in ICU IDT rounds this am  -Interval chart reviewed  -F/U with patient who is back to baseline. He is alert and oriented x 3. Son in room also. Patient states he feels good after the rest he had last night form being sedated. He is no longer agitated. He has not had another bloody BM since yesterday   -Updated on current clinical conditionI and let him know about the last 24 hours and his transfer to ICU and that there was some concern at the time he may have needed intubation for airway protection which led to our discussion of intubation, life supportive measures and CPR. Patient states he is glad he did not have to be on a machine. He states he  Doesn't think he would ever want this but he doesn't think he has not been in a situation where it was considered.  He states he is very active and he cuts the grass still and his QOL is good. He tells me he has been  for 63 years.   I asked him if he  while mowing his grass would he want CPR and he said "no let me go."   -We discussed CPR in more detail and his wishes should his heart and breathing stop and he does not think he would want CPR. I let him know he needs to have a discussion with his spouse to let her know his wishes.  Patient's son witnessed conversation  We are not going to change his code status today as he needs to have discussion with his spouse. He will remain full code for now  -Addressed all questions and concerns  -Emotional support provided    Acute blood loss anemia  No anticoag therapy hx  Has rivaroxaban " listed in his home med list.   Does not need vasopressor support at this time  Transfusions PRN  GI following          Delirium  At high risk for falls and self injury  CT brain showed generalized volume loss but acute pathology or mass  Transferred to ICU for precedex  Remains in restraints and agitated;precedex recently started  Patient is back to baseline; alert and oriented     Gastrointestinal bleed  As above  Hold off on antiplatelets and anticoagulants   GI following  Primary mgmt  No more bloody BMs     HTN (hypertension), benign  Noted         Palliative encounter 5/31/22    -Palliative consult for GOC and to start conversations on a 87 y/o patient being transferred to ICU for mental status change and GI bleed requiring transfusion  -chart reviewed in detail  -At time of consult patient in ICU. He is restrained and very agitated moving all extremities. Precedex recently started after patient has not been able to calm or rest in last 24 hours.   Patient son in room. He states his mother stayed the night and did not rest so she is gone to sleep. He reports at baseline patient is alert and oriented, independent and still mows his grass.   -update given  -Engaged son in a conversation about ACP and he states he does not know what his father's wishes would be concerning life supportive measures and that we will need to address with his mother, or patient if he returns to baseline.   -Addressed all questions and concerns  -Emotional support provided  -I will f/u with spouse tomorrow as she has gone home to sleep.             Subjective:         HPI:  Per chart review Mr. Duran is a 85 yo M who presents with a CC of vomiting black material for 2 days.  Patient presented to an ED in [place]- and was transferred here for evaluation of a likely upper GI bleed.  Patient found to have a Hgb of 6.4.  Patient takes Alkaseltser several times a week. No abdominal pain. No history of GI bleed.  Patient received 2 units  of blood. Otherwise- he has no complaints. He is on no medications.  Only med issue is hypertension- but he states he does not take any meds for this. He is non-ill appearing and joking around            Overview/Hospital Course:  Mr Duran presented with acute blood loss anemia secondary to GI bleed. Developed hyperactive delirium. Received a dose of lorazepam overnight with paradoxical effect. Very delirious and hyperactive. Is in 4 point restraints. Also with bright red blood per rectum, per nursing report. Two units of blood ordered. Transferred to ICU for precedex infusion        Interval History: very delirious and hyperactive. Got 1 mg of lorazepam last night. With active GI bleed. BP borderline low. On fluids.       Hospital Course:  No notes on file    Interval History: patient alert and oriented x 3. Back to baseline. States he feels better after being able to sleep last night    Medications:  Continuous Infusions:   dextrose 5 % 100 mL/hr at 06/01/22 0950     Scheduled Meds:   mupirocin   Nasal BID    pantoprazole  40 mg Intravenous BID    sodium chloride 0.9%  1,000 mL Intravenous Once     PRN Meds:sodium chloride, NORepinephrine bitartrate-D5W    Objective:     Vital Signs (Most Recent):  Temp: 96.4 °F (35.8 °C) (06/01/22 0745)  Pulse: 78 (06/01/22 0900)  Resp: 18 (06/01/22 0900)  BP: (!) 107/58 (06/01/22 0900)  SpO2: 98 % (06/01/22 0900)   Vital Signs (24h Range):  Temp:  [96.4 °F (35.8 °C)-98 °F (36.7 °C)] 96.4 °F (35.8 °C)  Pulse:  [] 78  Resp:  [16-35] 18  SpO2:  [70 %-100 %] 98 %  BP: ()/(50-69) 107/58     Weight: 77.5 kg (170 lb 13.7 oz)  Body mass index is 24.52 kg/m².    Physical Exam  Vitals and nursing note reviewed.   Constitutional:       General: He is not in acute distress.     Appearance: He is ill-appearing. He is not toxic-appearing or diaphoretic.   HENT:      Head: Normocephalic and atraumatic.      Right Ear: External ear normal.      Left Ear: External ear normal.       Nose: Nose normal.      Mouth/Throat:      Mouth: Mucous membranes are moist.   Eyes:      Extraocular Movements: Extraocular movements intact.      Pupils: Pupils are equal, round, and reactive to light.   Cardiovascular:      Rate and Rhythm: Normal rate and regular rhythm.   Pulmonary:      Effort: Pulmonary effort is normal.   Abdominal:      General: Abdomen is flat. Bowel sounds are normal. There is no distension.      Palpations: Abdomen is soft.   Musculoskeletal:      Right lower leg: No edema.      Left lower leg: No edema.   Skin:     General: Skin is warm and dry.   Neurological:      Mental Status: He is alert and oriented to person, place, and time. Mental status is at baseline.   Psychiatric:         Mood and Affect: Mood normal.         Behavior: Behavior normal.       Review of Symptoms      Symptom Assessment (ESAS 0-10 Scale)  Pain:  0  Dyspnea:  0  Anxiety:  0  Nausea:  0  Depression:  0  Anorexia:  0  Fatigue:  0  Insomnia:  0  Restlessness:  0  Agitation:  0         Advance Care Planning   Advance Directives:   Living Will: No    LaPOST: No    Do Not Resuscitate Status: No    Medical Power of : spouse by default.      Decision Making:  Patient answered questions       Significant Labs: All pertinent labs within the past 24 hours have been reviewed.  CBC:   Recent Labs   Lab 06/01/22  0413   WBC 8.45   HGB 7.7*   HCT 26.0*   MCV 86        BMP:  Recent Labs   Lab 06/01/22  0231   GLU 87   *   K 4.1   *   CO2 20*   BUN 48*   CREATININE 1.0   CALCIUM 7.5*   MG 2.0     LFT:  Lab Results   Component Value Date    AST 31 06/01/2022    ALKPHOS 61 06/01/2022    BILITOT 2.4 (H) 06/01/2022     Albumin:   Albumin   Date Value Ref Range Status   06/01/2022 2.3 (L) 3.5 - 5.2 g/dL Final     Protein:   Total Protein   Date Value Ref Range Status   06/01/2022 4.4 (L) 6.0 - 8.4 g/dL Final     Lactic acid:   No results found for: LACTATE    Significant Imaging: I have reviewed all  pertinent imaging results/findings within the past 24 hours. CT Head      Janette Fitzpatrick NP  Palliative Medicine  [facility] - [place] - Intensive Care       17 min ACP time spent in goals of care, emotional support, formulating and communicating prognosis, exploring burden/benefit of various approaches of treatment.      50% of 20 mins spent in chart review, face to face discussion, symptom assessment, coordination of care with other specialists and d/c planning.    Total time 37 mins

## 2022-06-01 NOTE — SUBJECTIVE & OBJECTIVE
Interval History: patient alert and oriented x 3. Back to baseline. States he feels better after being able to sleep last night    Medications:  Continuous Infusions:   dextrose 5 % 100 mL/hr at 06/01/22 0950     Scheduled Meds:   mupirocin   Nasal BID    pantoprazole  40 mg Intravenous BID    sodium chloride 0.9%  1,000 mL Intravenous Once     PRN Meds:sodium chloride, NORepinephrine bitartrate-D5W    Objective:     Vital Signs (Most Recent):  Temp: 96.4 °F (35.8 °C) (06/01/22 0745)  Pulse: 78 (06/01/22 0900)  Resp: 18 (06/01/22 0900)  BP: (!) 107/58 (06/01/22 0900)  SpO2: 98 % (06/01/22 0900)   Vital Signs (24h Range):  Temp:  [96.4 °F (35.8 °C)-98 °F (36.7 °C)] 96.4 °F (35.8 °C)  Pulse:  [] 78  Resp:  [16-35] 18  SpO2:  [70 %-100 %] 98 %  BP: ()/(50-69) 107/58     Weight: 77.5 kg (170 lb 13.7 oz)  Body mass index is 24.52 kg/m².    Physical Exam  Vitals and nursing note reviewed.   Constitutional:       General: He is not in acute distress.     Appearance: He is ill-appearing. He is not toxic-appearing or diaphoretic.   HENT:      Head: Normocephalic and atraumatic.      Right Ear: External ear normal.      Left Ear: External ear normal.      Nose: Nose normal.      Mouth/Throat:      Mouth: Mucous membranes are moist.   Eyes:      Extraocular Movements: Extraocular movements intact.      Pupils: Pupils are equal, round, and reactive to light.   Cardiovascular:      Rate and Rhythm: Normal rate and regular rhythm.   Pulmonary:      Effort: Pulmonary effort is normal.   Abdominal:      General: Abdomen is flat. Bowel sounds are normal. There is no distension.      Palpations: Abdomen is soft.   Musculoskeletal:      Right lower leg: No edema.      Left lower leg: No edema.   Skin:     General: Skin is warm and dry.   Neurological:      Mental Status: He is alert and oriented to person, place, and time. Mental status is at baseline.   Psychiatric:         Mood and Affect: Mood normal.         Behavior:  Behavior normal.       Review of Symptoms      Symptom Assessment (ESAS 0-10 Scale)  Pain:  0  Dyspnea:  0  Anxiety:  0  Nausea:  0  Depression:  0  Anorexia:  0  Fatigue:  0  Insomnia:  0  Restlessness:  0  Agitation:  0         Advance Care Planning   Advance Directives:   Living Will: No    LaPOST: No    Do Not Resuscitate Status: No    Medical Power of : spouse by default.      Decision Making:  Patient answered questions       Significant Labs: All pertinent labs within the past 24 hours have been reviewed.  CBC:   Recent Labs   Lab 06/01/22  0413   WBC 8.45   HGB 7.7*   HCT 26.0*   MCV 86        BMP:  Recent Labs   Lab 06/01/22  0231   GLU 87   *   K 4.1   *   CO2 20*   BUN 48*   CREATININE 1.0   CALCIUM 7.5*   MG 2.0     LFT:  Lab Results   Component Value Date    AST 31 06/01/2022    ALKPHOS 61 06/01/2022    BILITOT 2.4 (H) 06/01/2022     Albumin:   Albumin   Date Value Ref Range Status   06/01/2022 2.3 (L) 3.5 - 5.2 g/dL Final     Protein:   Total Protein   Date Value Ref Range Status   06/01/2022 4.4 (L) 6.0 - 8.4 g/dL Final     Lactic acid:   No results found for: LACTATE    Significant Imaging: I have reviewed all pertinent imaging results/findings within the past 24 hours. CT Head

## 2022-06-01 NOTE — NURSING
Bladder scan with 341 mL of urine after pt had not voided six hours after morrow removal. Encouraged to use urinal, 300 mL of urine output per urinal

## 2022-06-02 LAB
ANION GAP SERPL CALC-SCNC: 7 MMOL/L (ref 8–16)
BASOPHILS # BLD AUTO: 0.03 K/UL (ref 0–0.2)
BASOPHILS NFR BLD: 0.4 % (ref 0–1.9)
BUN SERPL-MCNC: 22 MG/DL (ref 8–23)
CALCIUM SERPL-MCNC: 7.5 MG/DL (ref 8.7–10.5)
CHLORIDE SERPL-SCNC: 113 MMOL/L (ref 95–110)
CO2 SERPL-SCNC: 20 MMOL/L (ref 23–29)
CREAT SERPL-MCNC: 0.8 MG/DL (ref 0.5–1.4)
DIFFERENTIAL METHOD: ABNORMAL
EOSINOPHIL # BLD AUTO: 0.3 K/UL (ref 0–0.5)
EOSINOPHIL NFR BLD: 4.8 % (ref 0–8)
ERYTHROCYTE [DISTWIDTH] IN BLOOD BY AUTOMATED COUNT: 18.2 % (ref 11.5–14.5)
EST. GFR  (AFRICAN AMERICAN): >60 ML/MIN/1.73 M^2
EST. GFR  (NON AFRICAN AMERICAN): >60 ML/MIN/1.73 M^2
GLUCOSE SERPL-MCNC: 92 MG/DL (ref 70–110)
HCT VFR BLD AUTO: 25.5 % (ref 40–54)
HGB BLD-MCNC: 7.8 G/DL (ref 14–18)
IMM GRANULOCYTES # BLD AUTO: 0.03 K/UL (ref 0–0.04)
IMM GRANULOCYTES NFR BLD AUTO: 0.4 % (ref 0–0.5)
LYMPHOCYTES # BLD AUTO: 1.1 K/UL (ref 1–4.8)
LYMPHOCYTES NFR BLD: 16.5 % (ref 18–48)
MAGNESIUM SERPL-MCNC: 1.9 MG/DL (ref 1.6–2.6)
MCH RBC QN AUTO: 25.9 PG (ref 27–31)
MCHC RBC AUTO-ENTMCNC: 30.6 G/DL (ref 32–36)
MCV RBC AUTO: 85 FL (ref 82–98)
MONOCYTES # BLD AUTO: 0.5 K/UL (ref 0.3–1)
MONOCYTES NFR BLD: 7.6 % (ref 4–15)
NEUTROPHILS # BLD AUTO: 4.8 K/UL (ref 1.8–7.7)
NEUTROPHILS NFR BLD: 70.3 % (ref 38–73)
NRBC BLD-RTO: 0 /100 WBC
PLATELET # BLD AUTO: 174 K/UL (ref 150–450)
PMV BLD AUTO: 9.7 FL (ref 9.2–12.9)
POTASSIUM SERPL-SCNC: 3.7 MMOL/L (ref 3.5–5.1)
RBC # BLD AUTO: 3.01 M/UL (ref 4.6–6.2)
SODIUM SERPL-SCNC: 140 MMOL/L (ref 136–145)
WBC # BLD AUTO: 6.86 K/UL (ref 3.9–12.7)

## 2022-06-02 PROCEDURE — 63600175 PHARM REV CODE 636 W HCPCS: Performed by: EMERGENCY MEDICINE

## 2022-06-02 PROCEDURE — 99231 SBSQ HOSP IP/OBS SF/LOW 25: CPT | Mod: ,,, | Performed by: INTERNAL MEDICINE

## 2022-06-02 PROCEDURE — 83735 ASSAY OF MAGNESIUM: CPT | Performed by: INTERNAL MEDICINE

## 2022-06-02 PROCEDURE — C1751 CATH, INF, PER/CENT/MIDLINE: HCPCS

## 2022-06-02 PROCEDURE — 97165 OT EVAL LOW COMPLEX 30 MIN: CPT

## 2022-06-02 PROCEDURE — 99231 PR SUBSEQUENT HOSPITAL CARE,LEVL I: ICD-10-PCS | Mod: ,,, | Performed by: INTERNAL MEDICINE

## 2022-06-02 PROCEDURE — C9113 INJ PANTOPRAZOLE SODIUM, VIA: HCPCS | Performed by: EMERGENCY MEDICINE

## 2022-06-02 PROCEDURE — 36415 COLL VENOUS BLD VENIPUNCTURE: CPT | Performed by: INTERNAL MEDICINE

## 2022-06-02 PROCEDURE — 94760 N-INVAS EAR/PLS OXIMETRY 1: CPT

## 2022-06-02 PROCEDURE — 36410 VNPNXR 3YR/> PHY/QHP DX/THER: CPT

## 2022-06-02 PROCEDURE — 80048 BASIC METABOLIC PNL TOTAL CA: CPT | Performed by: INTERNAL MEDICINE

## 2022-06-02 PROCEDURE — 21400001 HC TELEMETRY ROOM

## 2022-06-02 PROCEDURE — 85025 COMPLETE CBC W/AUTO DIFF WBC: CPT | Performed by: INTERNAL MEDICINE

## 2022-06-02 RX ADMIN — PANTOPRAZOLE SODIUM 40 MG: 40 INJECTION, POWDER, FOR SOLUTION INTRAVENOUS at 08:06

## 2022-06-02 RX ADMIN — MUPIROCIN: 20 OINTMENT TOPICAL at 08:06

## 2022-06-02 NOTE — SUBJECTIVE & OBJECTIVE
Interval History: very stable. No evidence of re-bleed  Review of Systems   Constitutional: Negative.    Respiratory: Negative.     Cardiovascular: Negative.    Gastrointestinal: Negative.    Objective:     Vital Signs (Most Recent):  Temp: 98.6 °F (37 °C) (06/02/22 1100)  Pulse: 85 (06/02/22 1400)  Resp: (!) 51 (06/02/22 1400)  BP: 126/83 (06/02/22 1400)  SpO2: (!) 93 % (06/02/22 1400) Vital Signs (24h Range):  Temp:  [96.4 °F (35.8 °C)-98.6 °F (37 °C)] 98.6 °F (37 °C)  Pulse:  [72-95] 85  Resp:  [18-51] 51  SpO2:  [91 %-99 %] 93 %  BP: ()/() 126/83     Weight: 77.5 kg (170 lb 13.7 oz)  Body mass index is 24.52 kg/m².    Intake/Output Summary (Last 24 hours) at 6/2/2022 1427  Last data filed at 6/2/2022 1400  Gross per 24 hour   Intake 845.37 ml   Output 950 ml   Net -104.63 ml        Physical Exam  Vitals and nursing note reviewed.   Constitutional:       General: He is not in acute distress.     Appearance: He is not toxic-appearing.   Cardiovascular:      Rate and Rhythm: Normal rate and regular rhythm.   Pulmonary:      Effort: Pulmonary effort is normal.      Breath sounds: No wheezing or rales.   Abdominal:      General: Bowel sounds are normal. There is no distension.      Palpations: Abdomen is soft.      Tenderness: There is no abdominal tenderness.   Musculoskeletal:      Right lower leg: No edema.      Left lower leg: No edema.   Skin:     Capillary Refill: Capillary refill takes less than 2 seconds.      Comments: Healed midline scar to abdomen    Neurological:      Mental Status: He is alert and oriented to person, place, and time.   Psychiatric:         Mood and Affect: Mood normal.         Behavior: Behavior normal.       Significant Labs: All pertinent labs within the past 24 hours have been reviewed.    Significant Imaging: I have reviewed all pertinent imaging results/findings within the past 24 hours.  I have reviewed and interpreted all pertinent imaging results/findings within the  past 24 hours.

## 2022-06-02 NOTE — ASSESSMENT & PLAN NOTE
Wife and son report he is not on anticoagulants or antiplatelets   Patient states he takes no medicines at home  Stable. No evidence of re-bleed thus far. Out of bed today  Pantoprazole IV BID for gastric ulcer with bleeding and ulcerated mass seen on EGD.   Gastroenterology following. Advance diet

## 2022-06-02 NOTE — PLAN OF CARE
Problem: Occupational Therapy  Goal: Occupational Therapy Goal  Description: Goals to be met by: 06/16/22     Patient will increase functional independence with ADLs by performing:    LE Dressing with Modified [place].  Grooming while standing at sink with Modified [place].  Toileting from toilet with Modified [place] for hygiene and clothing management.   Supine to sit with Modified [place].  Step transfer with Modified [place]  Toilet transfer to toilet with Modified [place].  Upper extremity exercise program x15 reps per handout, with independence.    Outcome: Ongoing, Progressing     OT rec HHOT with family supervision at d/c in order to increase safety and independence with ADLs and all aspects of functional mobility.

## 2022-06-02 NOTE — NURSING
Report called will transfer with belongings per wheelchair and cardiac monitoring   Nurse to travel with the patient   Report called to Shazia

## 2022-06-02 NOTE — NURSING
Both saline locks not functioning - attempted ultrasound guided iv and patient moving and hollering unsuccessful - MD notified - possible midline ?

## 2022-06-02 NOTE — PROGRESS NOTES
[facility] - [place] Intensive Care  Gastroenterology  Progress Note    Patient Name: Rayo Duran Sr.  MRN: [id number]  Admission Date: 5/30/2022  Hospital Length of Stay: 3 days  Code Status: Full Code   Attending Provider: Tonie Rivera MD  Consulting Provider: Mario Escalera MD  Primary Care Physician: Caroline Florez MD  Principal Problem: Acute blood loss anemia    Subjective:     Interval History: Pt doing well except for hip pain. Did have brief run of VT last night on the monitor, though no symptoms  HGB stable, no bm's in two days, tolerating diet    Review of Systems  Objective:     Vital Signs (Most Recent):  Temp: 98 °F (36.7 °C) (06/02/22 0700)  Pulse: 90 (06/02/22 0800)  Resp: (!) 35 (06/02/22 0800)  BP: 138/73 (06/02/22 0800)  SpO2: 98 % (06/02/22 0800) Vital Signs (24h Range):  Temp:  [96.4 °F (35.8 °C)-98 °F (36.7 °C)] 98 °F (36.7 °C)  Pulse:  [67-90] 90  Resp:  [18-35] 35  SpO2:  [95 %-99 %] 98 %  BP: ()/(46-74) 138/73     Weight: 77.5 kg (170 lb 13.7 oz) (05/31/22 1005)  Body mass index is 24.52 kg/m².      Intake/Output Summary (Last 24 hours) at 6/2/2022 0907  Last data filed at 6/2/2022 0800  Gross per 24 hour   Intake 1224.07 ml   Output 450 ml   Net 774.07 ml       Lines/Drains/Airways     Peripheral Intravenous Line  Duration                Peripheral IV - Single Lumen 05/31/22 1010 20 G Left;Posterior Hand 1 day         Peripheral IV - Single Lumen 05/31/22 1011 20 G Anterior;Left Forearm 1 day                Physical Exam    Significant Labs:  CBC:   Recent Labs   Lab 05/31/22  1456 06/01/22  0413 06/02/22  0400   WBC 10.32 8.45 6.86   HGB 7.8* 7.7* 7.8*   HCT 25.2* 26.0* 25.5*    185 174         Significant Imaging:      Assessment/Plan:     Active Diagnoses:    Diagnosis Date Noted POA    PRINCIPAL PROBLEM:  Acute blood loss anemia [D62] 05/30/2022 Yes    History of abdominal surgery [Z98.890] 06/01/2022 Not Applicable    Gastrointestinal bleed [K92.2] 05/31/2022 Yes     Delirium [R41.0] 05/31/2022 No    Palliative care encounter [Z51.5] 05/31/2022 Not Applicable    HTN (hypertension), benign [I10] 05/30/2022 Yes      Problems Resolved During this Admission:        Assessment.  Major GI bleeding from an anastomotic ulcer.  Still awaiting biopsy results as I do have some concern about the appearance of this ulcer.  But looks like bleeding control has been achieved with the clips that were placed.  From my perspective he can be moved to the floor to be watch for 1 more day.  And then hopefully home.  He will need follow-up EGD to document ulcer healing.  He can have a regular diet now    Thank you for your consult. I will follow-up with patient. Please contact us if you have any additional questions.    Mario Escalera MD  Gastroenterology  [facility] - Intensive Care

## 2022-06-02 NOTE — NURSING
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

## 2022-06-02 NOTE — PLAN OF CARE
Pt remains in icu. No acute events overnight. One episode of v-tach (6 beats). No s/s of sob or chest pain. Potassium was 3.7. h/H 7.8/ 25.5. Safety maintained. Call light in remains in reach. All needs met.   Problem: Adult Inpatient Plan of Care  Goal: Plan of Care Review  Outcome: Ongoing, Progressing  Goal: Patient-Specific Goal (Individualized)  Outcome: Ongoing, Progressing  Goal: Absence of Hospital-Acquired Illness or Injury  Outcome: Ongoing, Progressing  Goal: Optimal Comfort and Wellbeing  Outcome: Ongoing, Progressing  Goal: Readiness for Transition of Care  Outcome: Ongoing, Progressing     Problem: Coping Ineffective  Goal: Effective Coping  Outcome: Ongoing, Progressing     Problem: Skin Injury Risk Increased  Goal: Skin Health and Integrity  Outcome: Ongoing, Progressing

## 2022-06-02 NOTE — PROGRESS NOTES
[facility] Medicine  Progress Note    Patient Name: Rayo Duran Sr.  MRN: [id number]  Patient Class: IP- Inpatient   Admission Date: 5/30/2022  Length of Stay: 3 days  Attending Physician: Tonie Rivera MD  Primary Care Provider: Caroline Florez MD        Subjective:     Principal Problem:Acute blood loss anemia        HPI:  Mr. Duran is a 85 yo M who presents with a CC of vomiting black material for 2 days.  Patient presented to an ED in [place]- and was transferred here for evaluation of a likely upper GI bleed.  Patient found to have a Hgb of 6.4.  Patient takes Alkaseltser several times a week. No abdominal pain. No history of GI bleed.  Patient received 2 units of blood. Otherwise- he has no complaints. He is on no medications.  Only med issue is hypertension- but he states he does not take any meds for this. He is non-ill appearing and joking around     - David Masterson MD      Overview/Hospital Course:  Mr Duran presented with acute blood loss anemia secondary to GI bleed. Developed hyperactive delirium. Received a dose of lorazepam overnight with paradoxical effect. Very delirious and hyperactive. Is in 4 point restraints. Also with bright red blood per rectum, per nursing report. Two units of blood ordered. Transferred to ICU for precedex infusion. Transfused 2 U of PRBCs. Underwent EGD:    - Normal esophagus.   - A Billroth II anastomosis was found, characterized by ulceration.   - Spurting gastric ulcer with a visible vessel. Clips were placed. Ulcerated mass as the bleeding cource. Biopsied.   - Clotted blood in the gastric body. Fluid aspiration performed.     Weaned off precedex and was AAO x 3. Able to participate in exam and felt well. BP on low end of normal. Got extra IVFs. PICC to be placed in case vasopressor support required. Started clear liquid diet. Did very well overall and no bleeding. Did not require vasopressors. Diet advanced. Stepped down to  floor.       Interval History: very stable. No evidence of re-bleed  Review of Systems   Constitutional: Negative.    Respiratory: Negative.     Cardiovascular: Negative.    Gastrointestinal: Negative.    Objective:     Vital Signs (Most Recent):  Temp: 98.6 °F (37 °C) (06/02/22 1100)  Pulse: 85 (06/02/22 1400)  Resp: (!) 51 (06/02/22 1400)  BP: 126/83 (06/02/22 1400)  SpO2: (!) 93 % (06/02/22 1400) Vital Signs (24h Range):  Temp:  [96.4 °F (35.8 °C)-98.6 °F (37 °C)] 98.6 °F (37 °C)  Pulse:  [72-95] 85  Resp:  [18-51] 51  SpO2:  [91 %-99 %] 93 %  BP: ()/() 126/83     Weight: 77.5 kg (170 lb 13.7 oz)  Body mass index is 24.52 kg/m².    Intake/Output Summary (Last 24 hours) at 6/2/2022 1427  Last data filed at 6/2/2022 1400  Gross per 24 hour   Intake 845.37 ml   Output 950 ml   Net -104.63 ml        Physical Exam  Vitals and nursing note reviewed.   Constitutional:       General: He is not in acute distress.     Appearance: He is not toxic-appearing.   Cardiovascular:      Rate and Rhythm: Normal rate and regular rhythm.   Pulmonary:      Effort: Pulmonary effort is normal.      Breath sounds: No wheezing or rales.   Abdominal:      General: Bowel sounds are normal. There is no distension.      Palpations: Abdomen is soft.      Tenderness: There is no abdominal tenderness.   Musculoskeletal:      Right lower leg: No edema.      Left lower leg: No edema.   Skin:     Capillary Refill: Capillary refill takes less than 2 seconds.      Comments: Healed midline scar to abdomen    Neurological:      Mental Status: He is alert and oriented to person, place, and time.   Psychiatric:         Mood and Affect: Mood normal.         Behavior: Behavior normal.       Significant Labs: All pertinent labs within the past 24 hours have been reviewed.    Significant Imaging: I have reviewed all pertinent imaging results/findings within the past 24 hours.  I have reviewed and interpreted all pertinent imaging results/findings  "within the past 24 hours.      Assessment/Plan:      * Acute blood loss anemia  Wife and son report he is not on anticoagulants or antiplatelets   Patient states he takes no medicines at home  Stable. No evidence of re-bleed thus far. Out of bed today  Pantoprazole IV BID for gastric ulcer with bleeding and ulcerated mass seen on EGD.   Gastroenterology following. Advance diet      History of abdominal surgery  States he has "stomach surgery" about 40 yrs ago for a mass      Delirium  Resolved. Delirium precautions.   I opened curtains. Family is at bedside.          Gastrointestinal bleed  As above  Hold off on antiplatelets and anticoagulants       HTN (hypertension), benign  BP not high. Hold off on antihypertensives      VTE Risk Mitigation (From admission, onward)         Ordered     IP VTE HIGH RISK PATIENT  Once         05/30/22 1701     Place sequential compression device  Until discontinued         05/30/22 1701                Discharge Planning   PHILLIP:      Code Status: Full Code   Is the patient medically ready for discharge?:     Reason for patient still in hospital (select all that apply): Treatment  Discharge Plan A: Home Health        Discussed with patient, wife and son at bedside. PT/OT. Out of bed. Tentative discharge tomorrow if remains clinically stable    Critical care time spent on the evaluation and treatment of severe organ dysfunction, review of pertinent labs and imaging studies, discussions with consulting providers and discussions with patient/family: > 35 minutes.      Tonie Van MD  Department of Hospital Medicine   [facility] [place] - Intensive Care    "

## 2022-06-02 NOTE — NURSING
Notified Dr. Krause of pt having 6 beat run of v-tach. Pt is asymptomatic. No c/o of chest pain or sob. New orders to be placed by Dr. Krause.

## 2022-06-02 NOTE — NURSING
"Patient up to the bedside chair - independently and steady on his feet - did feel a " little dizzy " no change in vitals   Family at the bedside   In no acute distress   Tolerating diet no bowel movement today- abdomen is soft and non tender   Left upper forearm with noted redness - possible infiltrate although no fluids going through the site- Iv site removed from the left antecub - will elevate- radial pulses present - will continue to monitor   "

## 2022-06-02 NOTE — PT/OT/SLP EVAL
"Occupational Therapy   Evaluation    Name: Rayo Duran Sr.  MRN: [id number]  Admitting Diagnosis:  Acute blood loss anemia  Recent Surgery: Procedure(s) (LRB):  EGD (ESOPHAGOGASTRODUODENOSCOPY) (N/A) 2 Days Post-Op    Recommendations:     Discharge Recommendations: home health OT (with family assist/supervision)  Discharge Equipment Recommendations:  none  Barriers to discharge:  None    Assessment:     Rayo Duran Sr. is a 86 y.o. male with a medical diagnosis of Acute blood loss anemia. Performance deficits affecting function: weakness, impaired functional mobilty, impaired self care skills, impaired endurance, pain.      Mild dizziness with standing. BP seated: 140/73, 85 bpm. BP standing after 1 min: 142/64, 104 bpm. After ambulation (~84 ft): sO2 99%, 94 bpm.      Rehab Prognosis: Good; patient would benefit from acute skilled OT services to address these deficits and reach maximum level of function.       Plan:     Patient to be seen  (3-5x/week) to address the above listed problems via self-care/home management, therapeutic activities, therapeutic exercises  · Plan of Care Expires: 06/16/22  · Plan of Care Reviewed with: patient, spouse, son    Subjective     Chief Complaint: some hip pain in the bed  Patient/Family Comments/goals:  Declined sitting EOB after session; pt felt better as he was moving     Occupational Profile:  Living Environment: Pt lives with his wife in a 2 SH with no steps at entry and a chair lift to the second floor. Pt's bed/bath are on the first level: removable shower head, grab bars, and bath bench; grab bars by the toilet.   Previous level of function: independent   Roles and Routines: likes to bet on horse races, watch football, and mow the yard   Equipment Used at Home:  grab bar, bath bench  Assistance upon Discharge: wife, son - both present     Pain/Comfort:  · Pain Rating 1:  (B/L hip pain "comes and goes")  · Pain Addressed 1: Reposition    Patients cultural, spiritual, " Scientologist conflicts given the current situation: no    Objective:     Communicated with: nurseShazia, prior to session.  Patient found HOB elevated with peripheral IV, telemetry, PICC line upon OT entry to room.    General Precautions: Standard, fall, hearing impaired   Orthopedic Precautions:N/A   Braces: N/A  Respiratory Status: Room air    Occupational Performance:    Bed Mobility:    · Patient completed Scooting with supervision  · Patient completed Supine to Sit with supervision  · Patient completed Sit to Supine with supervision    Functional Mobility/Transfers:  · Patient completed Sit <> Stand Transfer with stand by assistance  with  no assistive device   · Functional Mobility: pt completed in-room functional mobility initially with CGA d/t mild dizziness then SBA as it subsided. After, pt completed x~88 ft with CGA-SBA using no AD. Edu on turning directions slower.     Activities of Daily Living:  · Upper Body Dressing: modified independence donning back gown and face mask while seated unsupported at EOB  · Lower Body Dressing: supervision seated EOB to don B/L socks   · Grooming: pt declined grooming at the sink at this time     Cognitive/Visual Perceptual:  Cognitive/Psychosocial Skills:     -       Follows Commands/attention:follows simple commands  -       Communication: clear/fluent  -       Memory: No Deficits noted  -       Safety awareness/insight to disability: mildly impaired   -       Mood/Affect/Coping skills/emotional control: Cooperative, Happy and Pleasant  Visual/Perceptual:      -Intact  R/L discrimination      Physical Exam:  Balance:    -       seated: MOD I; standing: SBA-CGA  Postural examination/scapula alignment:    -       Rounded shoulders  Skin integrity: Visible skin intact  Edema:  no BUE edema noted  Sensation:    -       Intact  light/touch BUE  Dominant hand:    -       Right  Upper Extremity Range of Motion:     -       Right Upper Extremity: WFL  -       Left Upper  Extremity: WFL  Upper Extremity Strength:    -       Right Upper Extremity: WFL  -       Left Upper Extremity: WFL   Strength:    -       Right Upper Extremity: WFL  -       Left Upper Extremity: WFL  Fine Motor Coordination:    -       Intact  Left hand, manipulation of objects and Right hand, manipulation of objects  Gross motor coordination:   WFL    AMPAC 6 Click ADL:  AMPAC Total Score: 22    Treatment & Education:  · Pt and family educated on OT role/POC.   · Importance of OOB activity with staff assistance. Encouraged OOB to the toilet/chair with nursing daily or to sit EOB for meals.   · Safety during functional t/f and mobility    · Multiple self-care tasks/functional mobility completed- assistance level noted above   · All questions/concerns answered within OT scope of practice     Education:    Patient left HOB elevated with all lines intact, call button in reach, bed alarm on, nurse, Shazia, notified, wife and son present and all needs met/within reach    GOALS:   Multidisciplinary Problems     Occupational Therapy Goals        Problem: Occupational Therapy    Goal Priority Disciplines Outcome Interventions   Occupational Therapy Goal     OT, PT/OT Ongoing, Progressing    Description: Goals to be met by: 06/16/22     Patient will increase functional independence with ADLs by performing:    LE Dressing with Modified [place].  Grooming while standing at sink with Modified [place].  Toileting from toilet with Modified [place] for hygiene and clothing management.   Supine to sit with Modified [place].  Step transfer with Modified [place]  Toilet transfer to toilet with Modified [place].  Upper extremity exercise program x15 reps per handout, with independence.                     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

## 2022-06-02 NOTE — PLAN OF CARE
06/02/22 1540   Discharge Reassessment   Assessment Type Discharge Planning Reassessment   Did the patient's condition or plan change since previous assessment? Yes   Discharge Plan discussed with: Spouse/sig other   Communicated PHILLIP with patient/caregiver Date not available/Unable to determine   Discharge Plan A Home Health   Discharge Plan B Home with family   DME Needed Upon Discharge    (tbd)   Why the patient remains in the hospital Requires continued medical care   Patient remain in the ICU.  DC plan on going.  CM to assist as needed.

## 2022-06-03 ENCOUNTER — PATIENT OUTREACH (OUTPATIENT)
Dept: ADMINISTRATIVE | Facility: CLINIC | Age: 87
End: 2022-06-03
Payer: MEDICARE

## 2022-06-03 ENCOUNTER — PATIENT MESSAGE (OUTPATIENT)
Dept: ADMINISTRATIVE | Facility: CLINIC | Age: 87
End: 2022-06-03
Payer: MEDICARE

## 2022-06-03 VITALS
TEMPERATURE: 98 F | BODY MASS INDEX: 24.46 KG/M2 | OXYGEN SATURATION: 96 % | SYSTOLIC BLOOD PRESSURE: 131 MMHG | RESPIRATION RATE: 18 BRPM | HEART RATE: 81 BPM | DIASTOLIC BLOOD PRESSURE: 71 MMHG | WEIGHT: 170.88 LBS | HEIGHT: 70 IN

## 2022-06-03 DIAGNOSIS — K28.9 ANASTOMOTIC ULCER: Primary | ICD-10-CM

## 2022-06-03 PROBLEM — D62 ACUTE BLOOD LOSS ANEMIA: Status: RESOLVED | Noted: 2022-05-30 | Resolved: 2022-06-03

## 2022-06-03 PROBLEM — R41.0 DELIRIUM: Status: RESOLVED | Noted: 2022-05-31 | Resolved: 2022-06-03

## 2022-06-03 PROBLEM — K92.2 GASTROINTESTINAL BLEED: Status: RESOLVED | Noted: 2022-05-31 | Resolved: 2022-06-03

## 2022-06-03 PROCEDURE — C9113 INJ PANTOPRAZOLE SODIUM, VIA: HCPCS | Performed by: EMERGENCY MEDICINE

## 2022-06-03 PROCEDURE — 97161 PT EVAL LOW COMPLEX 20 MIN: CPT

## 2022-06-03 PROCEDURE — 99232 PR SUBSEQUENT HOSPITAL CARE,LEVL II: ICD-10-PCS | Mod: 95,,, | Performed by: NURSE PRACTITIONER

## 2022-06-03 PROCEDURE — 63600175 PHARM REV CODE 636 W HCPCS: Performed by: EMERGENCY MEDICINE

## 2022-06-03 PROCEDURE — 99231 PR SUBSEQUENT HOSPITAL CARE,LEVL I: ICD-10-PCS | Mod: ,,, | Performed by: STUDENT IN AN ORGANIZED HEALTH CARE EDUCATION/TRAINING PROGRAM

## 2022-06-03 PROCEDURE — 99231 SBSQ HOSP IP/OBS SF/LOW 25: CPT | Mod: ,,, | Performed by: STUDENT IN AN ORGANIZED HEALTH CARE EDUCATION/TRAINING PROGRAM

## 2022-06-03 PROCEDURE — 99232 SBSQ HOSP IP/OBS MODERATE 35: CPT | Mod: 95,,, | Performed by: NURSE PRACTITIONER

## 2022-06-03 RX ORDER — PANTOPRAZOLE SODIUM 40 MG/1
40 TABLET, DELAYED RELEASE ORAL 2 TIMES DAILY
Qty: 60 TABLET | Refills: 2 | Status: SHIPPED | OUTPATIENT
Start: 2022-06-03 | End: 2022-07-06 | Stop reason: SDUPTHER

## 2022-06-03 RX ADMIN — PANTOPRAZOLE SODIUM 40 MG: 40 INJECTION, POWDER, FOR SOLUTION INTRAVENOUS at 08:06

## 2022-06-03 RX ADMIN — MUPIROCIN: 20 OINTMENT TOPICAL at 08:06

## 2022-06-03 NOTE — NURSING
Midline removed tip intact 2x2 gauze and tape applied.  Discharge instructions went over with the patient, patient verbalizes an understanding with no questions.  Wife and son at bedside during instructions. Waiting on transport

## 2022-06-03 NOTE — PLAN OF CARE
06/03/22 1406   Medicare Message   Important Message from Medicare regarding Discharge Appeal Rights Explained to patient/caregiver;Other (comments)   Date IMM was signed 06/03/22   Time IMM was signed 1024   CM called patient's wife this am no answer, 2nd attempt no answer.  Copy will be sent certified mail:  [phone].

## 2022-06-03 NOTE — PROGRESS NOTES
C3 nurse attempted to contact Rayo Duran Sr.  for a TCC post hospital discharge follow up call. No answer. Left voicemail with callback information. The patient does not have a scheduled HOSFU appointment. Non [facility] PCP

## 2022-06-03 NOTE — PLAN OF CARE
Problem: Fall Injury Risk  Goal: Absence of Fall and Fall-Related Injury  Outcome: Ongoing, Progressing     Problem: Adjustment to Illness (Gastrointestinal Bleeding)  Goal: Optimal Coping with Acute Illness  Outcome: Ongoing, Progressing     Problem: Restraint, Nonbehavioral (Nonviolent)  Goal: Absence of Harm or Injury  Outcome: Ongoing, Progressing

## 2022-06-03 NOTE — SUBJECTIVE & OBJECTIVE
Interval History: patient sitting on side of bed waiting for transport and d/c.  C/o weakness but no other complaints      Medications:  Continuous Infusions:  Scheduled Meds:   mupirocin   Nasal BID    pantoprazole  40 mg Intravenous BID    sodium chloride 0.9%  1,000 mL Intravenous Once     PRN Meds:sodium chloride    Objective:     Vital Signs (Most Recent):  Temp: 98 °F (36.7 °C) (06/03/22 1105)  Pulse: 81 (06/03/22 1105)  Resp: 18 (06/03/22 1105)  BP: 131/71 (06/03/22 1105)  SpO2: 96 % (06/03/22 1105) Vital Signs (24h Range):  Temp:  [98 °F (36.7 °C)-98.6 °F (37 °C)] 98 °F (36.7 °C)  Pulse:  [75-86] 81  Resp:  [18-51] 18  SpO2:  [93 %-100 %] 96 %  BP: (111-146)/(69-83) 131/71     Weight: 77.5 kg (170 lb 13.7 oz)  Body mass index is 24.52 kg/m².    Physical Exam  Constitutional:       General: He is not in acute distress.     Appearance: He is not ill-appearing or toxic-appearing.   Cardiovascular:      Rate and Rhythm: Normal rate and regular rhythm.      Pulses: Normal pulses.   Pulmonary:      Effort: Pulmonary effort is normal.      Breath sounds: Normal breath sounds.   Abdominal:      General: Abdomen is flat. Bowel sounds are normal.      Palpations: Abdomen is soft.   Musculoskeletal:      Right lower leg: No edema.      Left lower leg: No edema.   Skin:     General: Skin is warm and dry.   Neurological:      Mental Status: He is alert and oriented to person, place, and time.   Psychiatric:         Mood and Affect: Mood normal.         Behavior: Behavior normal.       Palliative Exam    Advance Care Planning   Advance Care Planning       Significant Labs: All pertinent labs within the past 24 hours have been reviewed.  CBC:   Recent Labs   Lab 06/02/22  0400   WBC 6.86   HGB 7.8*   HCT 25.5*   MCV 85        BMP:  No results for input(s): GLU, NA, K, CL, CO2, BUN, CREATININE, CALCIUM, MG in the last 24 hours.  LFT:  Lab Results   Component Value Date    AST 31 06/01/2022    ALKPHOS 61 06/01/2022     BILITOT 2.4 (H) 06/01/2022     Albumin:   Albumin   Date Value Ref Range Status   06/01/2022 2.3 (L) 3.5 - 5.2 g/dL Final     Protein:   Total Protein   Date Value Ref Range Status   06/01/2022 4.4 (L) 6.0 - 8.4 g/dL Final     Lactic acid:   No results found for: LACTATE    Significant Imaging:  none in last 24 hours

## 2022-06-03 NOTE — NURSING
Bedside shift report received from Kellee ISAAC.  Patient denies any needs or wants at this time. Call light within reach.  Will continue to monitor.

## 2022-06-03 NOTE — PT/OT/SLP EVAL
Physical Therapy Evaluation and Discharge Note    Patient Name:  Rayo Duran Sr.   MRN:  [id number]    Recommendations:     Discharge Recommendations:  home health PT   Discharge Equipment Recommendations: none   Barriers to discharge: None    Assessment:     Rayo Duran Sr. is a 86 y.o. male admitted with a medical diagnosis of Acute blood loss anemia.     Recent Surgery: Procedure(s) (LRB):  EGD (ESOPHAGOGASTRODUODENOSCOPY) (N/A) 3 Days Post-Op; Pt spent some time in ICU during this admit and did received blood transfusions.     Plan:     Pt to be D/C'ed home today.      Subjective     Chief Complaint: N/A  Patient/Family Comments/goals: Pt ready to go home today.   Pain/Comfort:  · Pain Rating 1: 0/10    Living Environment:  Pt lives with spouse, son, and dtr in a 2SH with no concerns at entry.  There is a chair lift to the 2nd floor.  Pt's bedroom/bathroom are on the 1st floor.   Prior to admission, patients level of function was independent and driving.  Equipment at home: bath bench, grab bar.  Upon discharge, patient will have assistance from family.    Objective:     Patient found HOB elevated with peripheral IV, PICC line, telemetry upon PT entry to room.    General Precautions: Standard, fall, hearing impaired   Orthopedic Precautions:N/A   Braces: N/A   Respiratory Status: Room air    Exams:  · Cognitive Exam:  Patient was able to follow 2 step commands 2* Nikolai.   · Gross Motor Coordination:  WFL  · Postural Exam:  Patient presented with the following abnormalities:    · -       No postural abnormalities identified  · Sensation:    · -       Intact  light/touch BLE  · Skin Integrity/Edema:      · -       Skin integrity: Visible skin intact  · -       Edema: None noted BLE  · BLE ROM: WFL  · BLE Strength: WFL    Functional Mobility:  · Bed Mobility:     · Scooting: modified independence  · Supine to Sit: modified independence with HOB elevated   · Transfers:     · Sit to Stand:  stand by assistance with  no AD  · Gait: Pt ambulated ~250 ft with SBA using no AD.  Pt with mild unsteadiness, slight decreased safety awareness, decreased step length, and decreased jen.   · Balance: Pt with fair dynamic standing balance.     AM-PAC 6 CLICK MOBILITY  Total Score:22       Therapeutic Activities and Exercises:  Pt/family issued/educated on HEP for seated/supine LE therex, encouraged to perform 2x/day ~10-15 reps.  Family interested in  services.  Pt/family verbalized good understanding.       Patient left sitting edge of bed with all lines intact, call button in reach and spouse and son present.    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

## 2022-06-03 NOTE — PROGRESS NOTES
GI Progress Note - 6/3/2022   See GI consult note for full H&P      Subjective:   Patient seen and examined at bedside. Vitals stable. Most recent Hg is from yesterday and is stable at 7.8. Tolerating oral intake.     Objective:    Vital signs in last 24 hours:  Temp:  [98.5 °F (36.9 °C)-98.6 °F (37 °C)] 98.5 °F (36.9 °C)  Pulse:  [75-95] 75  Resp:  [18-51] 18  SpO2:  [91 %-100 %] 97 %  BP: (111-195)/() 146/81    Intake/Output last 3 shifts:  I/O last 3 completed shifts:  In: 460 [P.O.:460]  Out: 650 [Urine:650]  Intake/Output this shift:  No intake/output data recorded.    Gen: no apparent distress, appears stated age  Heart: RRR, no murmurs, rub or gallops appreciated  Lung: No w/r/c. CTA bilaterally   Abdomen: NTND, BS present, soft   Ext: 2+ pulses, no lower ext. edema  Neuro: A&O X 3       Recent Labs   Lab 05/31/22  1456 06/01/22  0413 06/02/22  0400   WBC 10.32 8.45 6.86   HGB 7.8* 7.7* 7.8*   HCT 25.2* 26.0* 25.5*    185 174   MCV 83 86 85      Recent Labs   Lab 05/31/22  0218 06/01/22  0231 06/02/22  0400    146* 140   K 4.9 4.1 3.7   * 121* 113*   CO2 18* 20* 20*   BUN 52* 48* 22   CALCIUM 7.8* 7.5* 7.5*   PHOS  --  2.4*  --      Recent Labs   Lab 05/30/22  0609 05/30/22  0610 05/31/22  0218 06/01/22  0231   AST 11  --  15 31   ALT 13  --  9* 15   ALKPHOS 84  --  67 61   BILITOT 0.9  --  2.2* 2.4*   INR  --  1.1  --   --        Scheduled Meds:   mupirocin   Nasal BID    pantoprazole  40 mg Intravenous BID    sodium chloride 0.9%  1,000 mL Intravenous Once     Continuous Infusions:      Assessment:  1. Anastomotic ulcer   2. Upper GI bleeding     Patient stable without further evidence of ongoing bleeding. Hg is stable. Tolerating diet. Plan for outpatient EGD to follow up ulcer healing.     Plan:    - Continue PPI BID on discharge   - Will follow pathology from recent EGD bx   - Plan for EGD outpatient to follow up ulcer healing   - No further inpatient GI recs, will sign off      Kaylyn Garcia   Gastroenterology   [facility]

## 2022-06-03 NOTE — PROGRESS NOTES
"[facility]  Palliative Medicine  Progress Note    Patient Name: Rayo Duran Sr.  MRN: [id number]  Admission Date: 2022  Hospital Length of Stay: 4 days  Code Status: Full Code   Attending Provider: No att. providers found  Consulting Provider: Janette Fitzpatrick NP  Primary Care Physician: Caroline Florez MD  Principal Problem:Acute blood loss anemia        Assessment/Plan:   Palliative encounter:    -Interval chart reviewed  -Patient back to baseline and plans for discharge today.  Spouse in room who I had not met before. Introduced myself.  -Let her know the discussion I had with the patient about his wishes of no life supportive measures but they need to continue discussion at home. She agreed and was supportive  -Addressed all questions  -Let them know if he is in the hospital again we will f/u with him    Palliative encounter: Date 22     -Discussed in ICU IDT rounds this am  -Interval chart reviewed  -F/U with patient who is back to baseline. He is alert and oriented x 3. Son in room also. Patient states he feels good after the rest he had last night form being sedated. He is no longer agitated. He has not had another bloody BM since yesterday   -Updated on current clinical conditionI and let him know about the last 24 hours and his transfer to ICU and that there was some concern at the time he may have needed intubation for airway protection which led to our discussion of intubation, life supportive measures and CPR. Patient states he is glad he did not have to be on a machine. He states he  Doesn't think he would ever want this but he doesn't think he has not been in a situation where it was considered.  He states he is very active and he cuts the grass still and his QOL is good. He tells me he has been  for 63 years.   I asked him if he  while mowing his grass would he want CPR and he said "no let me go."   -We discussed CPR in more detail and his wishes should his heart " and breathing stop and he does not think he would want CPR. I let him know he needs to have a discussion with his spouse to let her know his wishes.  Patient's son witnessed conversation  We are not going to change his code status today as he needs to have discussion with his spouse. He will remain full code for now  -Addressed all questions and concerns  -Emotional support provided       Subjective:     Chief Complaint: No chief complaint on file.      HPI:  Per chart review Mr. Duran is a 87 yo M who presents with a CC of vomiting black material for 2 days.  Patient presented to an ED in [place]- and was transferred here for evaluation of a likely upper GI bleed.  Patient found to have a Hgb of 6.4.  Patient takes Alkaseltser several times a week. No abdominal pain. No history of GI bleed.  Patient received 2 units of blood. Otherwise- he has no complaints. He is on no medications.  Only med issue is hypertension- but he states he does not take any meds for this. He is non-ill appearing and joking around            Overview/Hospital Course:  Mr Duran presented with acute blood loss anemia secondary to GI bleed. Developed hyperactive delirium. Received a dose of lorazepam overnight with paradoxical effect. Very delirious and hyperactive. Is in 4 point restraints. Also with bright red blood per rectum, per nursing report. Two units of blood ordered. Transferred to ICU for precedex infusion        Interval History: very delirious and hyperactive. Got 1 mg of lorazepam last night. With active GI bleed. BP borderline low. On fluids.       Hospital Course:  No notes on file    Interval History: patient sitting on side of bed waiting for transport and d/c.  C/o weakness but no other complaints      Medications:  Continuous Infusions:  Scheduled Meds:   mupirocin   Nasal BID    pantoprazole  40 mg Intravenous BID    sodium chloride 0.9%  1,000 mL Intravenous Once     PRN Meds:sodium chloride    Objective:     Vital  Signs (Most Recent):  Temp: 98 °F (36.7 °C) (06/03/22 1105)  Pulse: 81 (06/03/22 1105)  Resp: 18 (06/03/22 1105)  BP: 131/71 (06/03/22 1105)  SpO2: 96 % (06/03/22 1105) Vital Signs (24h Range):  Temp:  [98 °F (36.7 °C)-98.6 °F (37 °C)] 98 °F (36.7 °C)  Pulse:  [75-86] 81  Resp:  [18-51] 18  SpO2:  [93 %-100 %] 96 %  BP: (111-146)/(69-83) 131/71     Weight: 77.5 kg (170 lb 13.7 oz)  Body mass index is 24.52 kg/m².    Physical Exam  Constitutional:       General: He is not in acute distress.     Appearance: He is not ill-appearing or toxic-appearing.   Cardiovascular:      Rate and Rhythm: Normal rate and regular rhythm.      Pulses: Normal pulses.   Pulmonary:      Effort: Pulmonary effort is normal.      Breath sounds: Normal breath sounds.   Abdominal:      General: Abdomen is flat. Bowel sounds are normal.      Palpations: Abdomen is soft.   Musculoskeletal:      Right lower leg: No edema.      Left lower leg: No edema.   Skin:     General: Skin is warm and dry.   Neurological:      Mental Status: He is alert and oriented to person, place, and time.   Psychiatric:         Mood and Affect: Mood normal.         Behavior: Behavior normal.       Palliative Exam    Advance Care Planning   Advance Care Planning       Significant Labs: All pertinent labs within the past 24 hours have been reviewed.  CBC:   Recent Labs   Lab 06/02/22  0400   WBC 6.86   HGB 7.8*   HCT 25.5*   MCV 85        BMP:  No results for input(s): GLU, NA, K, CL, CO2, BUN, CREATININE, CALCIUM, MG in the last 24 hours.  LFT:  Lab Results   Component Value Date    AST 31 06/01/2022    ALKPHOS 61 06/01/2022    BILITOT 2.4 (H) 06/01/2022     Albumin:   Albumin   Date Value Ref Range Status   06/01/2022 2.3 (L) 3.5 - 5.2 g/dL Final     Protein:   Total Protein   Date Value Ref Range Status   06/01/2022 4.4 (L) 6.0 - 8.4 g/dL Final     Lactic acid:   No results found for: LACTATE    Significant Imaging:  none in last 24 hours      Janette Fitzpatrick,  NP  Palliative Medicine  [facility] [place] - Telemetry      > 50% of 25 min visit spent in chart review, face to face discussion of goals of care,  symptom assessment, coordination of care and emotional support.

## 2022-06-03 NOTE — DISCHARGE SUMMARY
[facility] Medicine  Discharge Summary      Patient Name: Rayo Duran Sr.  MRN: [id number]  Patient Class: IP- Inpatient  Admission Date: 5/30/2022  Hospital Length of Stay: 4 days  Discharge Date and Time:  06/03/2022 9:22 AM  Attending Physician: Tonie Rivera MD   Discharging Provider: Tonie Van MD  Primary Care Provider: Caroline Florez MD      HPI:   Mr. Duran is a 85 yo M who presents with a CC of vomiting black material for 2 days.  Patient presented to an ED in [place]- and was transferred here for evaluation of a likely upper GI bleed.  Patient found to have a Hgb of 6.4.  Patient takes Alkaseltser several times a week. No abdominal pain. No history of GI bleed.  Patient received 2 units of blood. Otherwise- he has no complaints. He is on no medications.  Only med issue is hypertension- but he states he does not take any meds for this. He is non-ill appearing and joking around     - David Masterson MD      Procedure(s) (LRB):  EGD (ESOPHAGOGASTRODUODENOSCOPY) (N/A)      Hospital Course:   Mr Duran presented with acute blood loss anemia secondary to GI bleed. Developed hyperactive delirium. Received a dose of lorazepam overnight with paradoxical effect. Very delirious and hyperactive. Is in 4 point restraints. Also with bright red blood per rectum, per nursing report. Two units of blood ordered. Transferred to ICU for precedex infusion. Transfused 2 U of PRBCs. Underwent EGD:    - Normal esophagus.   - A Billroth II anastomosis was found, characterized by ulceration.   - Spurting gastric ulcer with a visible vessel. Clips were placed. Ulcerated mass as the bleeding cource. Biopsied.   - Clotted blood in the gastric body. Fluid aspiration performed.     Weaned off precedex and was AAO x 3. Able to participate in exam and felt well. BP on low end of normal. Got extra IVFs. PICC to be placed in case vasopressor support required. Started clear liquid diet. Did very well  overall and no bleeding. Did not require vasopressors. Diet advanced. Stepped down to floor. Continued stable and requested discharge. Cleared by GI. Home health for PT/OT to be set up as recommended. Will f/u with GI for repeat EGD. Rx for pantoprazole 40 mg BID sent to pharmacy. Discussed with patient and son at bedside.        Goals of Care Treatment Preferences:  Code Status: Full Code      Consults:   Consults (From admission, onward)        Status Ordering Provider     Inpatient consult to PICC team ([facility])  Once        Provider:  (Not yet assigned)    Acknowledged KIAH FONTANA     Inpatient consult to Palliative Care  Once        Provider:  Janette Fitzpatrick, NP    Completed KIAH FONTANA     Inpatient consult to Gastroenterology  Once        Provider:  (Not yet assigned)    Completed CONSTANTINE DON        Final Active Diagnoses:    Diagnosis Date Noted POA    History of abdominal surgery [Z98.890] 06/01/2022 Not Applicable    Palliative care encounter [Z51.5] 05/31/2022 Not Applicable    HTN (hypertension), benign [I10] 05/30/2022 Yes      Problems Resolved During this Admission:    Diagnosis Date Noted Date Resolved POA    PRINCIPAL PROBLEM:  Acute blood loss anemia [D62] 05/30/2022 06/03/2022 Yes    Gastrointestinal bleed [K92.2] 05/31/2022 06/03/2022 Yes    Delirium [R41.0] 05/31/2022 06/03/2022 No       Discharged Condition: stable    Disposition: Home-Health Care [facility]      Pending Diagnostic Studies:     Procedure Component Value Units Date/Time    Specimen to Pathology, Surgery Gastrointestinal tract [[id number]] Collected: 05/31/22 1551    Order Status: Sent Lab Status: In process Updated: 06/01/22 0802    Specimen: Tissue          Medications:  Reconciled Home Medications:      Medication List      START taking these medications    pantoprazole 40 MG tablet  Commonly known as: PROTONIX  Take 1 tablet (40 mg total) by mouth 2 (two) times daily.        STOP taking these medications     rivaroxaban 10 mg Tab  Commonly known as: XARELTO  Patient was not taking this medication          Indwelling Lines/Drains at time of discharge:   Lines/Drains/Airways     None                 Time spent on the discharge of patient: > 35 minutes         Tonie Van MD  Department of Hospital Medicine  [facility] Tele[facility]

## 2022-06-03 NOTE — PLAN OF CARE
[facility] [place] Telemetry      HOME HEALTH ORDERS  FACE TO FACE ENCOUNTER    Patient Name: Rayo Duran Sr.  YOB: 1935    PCP: Caroline Florez MD   PCP Address: [address] [place] [address]  PCP Phone Number: [phone]  PCP Fax: [phone]    Encounter Date: 5/30/22    Admit to Home Health    Diagnoses:  Active Hospital Problems    Diagnosis  POA    *Acute blood loss anemia [D62]  Yes    History of abdominal surgery [Z98.890]  Not Applicable    Gastrointestinal bleed [K92.2]  Yes    Delirium [R41.0]  No    Palliative care encounter [Z51.5]  Not Applicable    HTN (hypertension), benign [I10]  Yes      Resolved Hospital Problems   No resolved problems to display.       Follow Up Appointments:  Future Appointments   Date Time Provider Department Center   9/13/2022 11:15 AM Caroline Florez MD [facility]       Allergies:  Review of patient's allergies indicates:   Allergen Reactions    Ativan [lorazepam] Anxiety     Patient had paradoxical effect after ativan IV and was so agitated he had to be restrained and transferred to ICU for precedex.       Medications: Review discharge medications with patient and family and provide education.      Current Discharge Medication List      START taking these medications    Details   pantoprazole (PROTONIX) 40 MG tablet Take 1 tablet (40 mg total) by mouth 2 (two) times daily.  Qty: 60 tablet, Refills: 2         STOP taking these medications       rivaroxaban (XARELTO) 10 mg Tab Comments:   Reason for Stopping:                 I have seen and examined this patient within the last 30 days. My clinical findings that support the need for the home health skilled services and home bound status are the following:no   Medical restrictions requiring assistance of another human to leave home due to  weakness .     Diet:   regular diet    Referrals/ Consults  Physical Therapy to evaluate and treat. Evaluate for home safety and equipment needs;  Establish/upgrade home exercise program. Perform / instruct on therapeutic exercises, gait training, transfer training, and Range of Motion.  Occupational Therapy to evaluate and treat. Evaluate home environment for safety and equipment needs. Perform/Instruct on transfers, ADL training, ROM, and therapeutic exercises.    Activities:   activity as tolerated    Nursing:   Agency to admit patient within 24 hours of hospital discharge unless specified on physician order or at patient request    SN to complete comprehensive assessment including routine vital signs. Instruct on disease process and s/s of complications to report to MD. Review/verify medication list sent home with the patient at time of discharge  and instruct patient/caregiver as needed. Frequency may be adjusted depending on start of care date.     Skilled nurse to perform up to 3 visits PRN for symptoms related to diagnosis    Notify MD if SBP > 160 or < 90; DBP > 90 or < 50; HR > 120 or < 50; Temp > 101; O2 < 88%    Ok to schedule additional visits based on staff availability and patient request on consecutive days within the home health episode.      Home Health Aide:  Physical Therapy Three times weekly and Occupational Therapy Three times weekly    I certify that this patient is confined to his home and needs physical therapy and occupational therapy.

## 2022-06-03 NOTE — PLAN OF CARE
Problem: Physical Therapy  Goal: Physical Therapy Goal  Outcome: Ongoing, Progressing     Pt ambulated in the hallway with SBA using no AD and mild unsteadiness.  Pt to be D/C'ed home today with HHPT services and family support.

## 2022-06-03 NOTE — PLAN OF CARE
Pt resting quietly over night.  No distress noted.  Call bell within reach.  Bed locked and in lowest position.  Pt reminded to call for any assistance/needs/pain.        Problem: Adult Inpatient Plan of Care  Goal: Plan of Care Review  Outcome: Ongoing, Progressing  Goal: Patient-Specific Goal (Individualized)  Outcome: Ongoing, Progressing  Goal: Absence of Hospital-Acquired Illness or Injury  Outcome: Ongoing, Progressing  Goal: Optimal Comfort and Wellbeing  Outcome: Ongoing, Progressing  Goal: Readiness for Transition of Care  Outcome: Ongoing, Progressing     Problem: Fall Injury Risk  Goal: Absence of Fall and Fall-Related Injury  Outcome: Ongoing, Progressing     Problem: Adjustment to Illness (Gastrointestinal Bleeding)  Goal: Optimal Coping with Acute Illness  Outcome: Ongoing, Progressing     Problem: Bleeding (Gastrointestinal Bleeding)  Goal: Hemostasis  Outcome: Ongoing, Progressing     Problem: Restraint, Nonbehavioral (Nonviolent)  Goal: Absence of Harm or Injury  Outcome: Ongoing, Progressing     Problem: Infection  Goal: Absence of Infection Signs and Symptoms  Outcome: Ongoing, Progressing     Problem: Coping Ineffective  Goal: Effective Coping  Outcome: Ongoing, Progressing     Problem: Skin Injury Risk Increased  Goal: Skin Health and Integrity  Outcome: Ongoing, Progressing

## 2022-06-07 PROBLEM — K21.9 GASTROESOPHAGEAL REFLUX: Status: ACTIVE | Noted: 2022-06-07

## 2022-06-07 PROBLEM — G25.81 RESTLESS LEG SYNDROME: Status: ACTIVE | Noted: 2022-06-07

## 2022-06-07 NOTE — PROGRESS NOTES
C3 nurse attempted to contact patient. The following occurred:   C3 nurse attempted to contact Rayo Duran Sr.  for a TCC post hospital discharge follow up call. The patient is unable to conduct the call @ this time. The patient requested a callback.

## 2022-06-16 LAB
FINAL PATHOLOGIC DIAGNOSIS: NORMAL
GROSS: NORMAL
Lab: NORMAL
MICROSCOPIC EXAM: NORMAL

## 2022-06-20 ENCOUNTER — TELEPHONE (OUTPATIENT)
Dept: GASTROENTEROLOGY | Facility: CLINIC | Age: 87
End: 2022-06-20
Payer: MEDICARE

## 2022-06-20 DIAGNOSIS — C16.9 MALIGNANT NEOPLASM OF STOMACH, UNSPECIFIED LOCATION: Primary | ICD-10-CM

## 2022-06-20 NOTE — TELEPHONE ENCOUNTER
----- Message from Mario Escalera MD sent at 6/20/2022 10:13 AM CDT -----  Yes and he already responded  ----- Message -----  From: Tracy Rosenberg MA  Sent: 6/20/2022  10:09 AM CDT  To: MD Jean-Claude Louie, did you send  a message.  I tried booking but could not because of restrictions.  Lina  ----- Message -----  From: Mario Escalera MD  Sent: 6/20/2022   9:08 AM CDT  To: [facility] Staff    I called the patient with the result.  He feels well and has been doing fine since his GI bleed.  The biopsy does show malignancy.  If possible he would like surgery on the [facility] and I will ask Dr. Montesinos to see him

## 2022-06-20 NOTE — TELEPHONE ENCOUNTER
----- Message from Mario Escalera MD sent at 6/20/2022  9:08 AM CDT -----  I called the patient with the result.  He feels well and has been doing fine since his GI bleed.  The biopsy does show malignancy.  If possible he would like surgery on the [facility] and I will ask Dr. Montesinos to see him

## 2022-06-21 ENCOUNTER — TELEPHONE (OUTPATIENT)
Dept: GASTROENTEROLOGY | Facility: CLINIC | Age: 87
End: 2022-06-21
Payer: MEDICARE

## 2022-06-21 DIAGNOSIS — C16.9 MALIGNANT NEOPLASM OF STOMACH, UNSPECIFIED LOCATION: Primary | ICD-10-CM

## 2022-06-21 NOTE — TELEPHONE ENCOUNTER
----- Message from Rosa Gutierrez RN sent at 6/21/2022  4:03 PM CDT -----  Many thanks.    I will f/u with [facility]    Shanthi  ----- Message -----  From: Tracy Rosenberg MA  Sent: 6/21/2022   3:49 PM CDT  To: Rosa Gutierrez RN    Hi,  will place a referral.  Thank You!   Lina  ----- Message -----  From: Mario Escalera MD  Sent: 6/21/2022   3:48 PM CDT  To: Tracy Rosenberg MA    Sure, will put in referral  ----- Message -----  From: Tracy Rosenberg MA  Sent: 6/21/2022   3:20 PM CDT  To: Mario Escalera MD      ----- Message -----  From: Rosa Gutierrez RN  Sent: 6/21/2022   3:18 PM CDT  To: Tracy Rosenberg MA    I see that you have reached out to him and are trying to get him in with Dr Montesinos on [facility].  Does he also need Med Onc?  If so, then I can send to our [facility] navigator to see if  he could be seen there as well.    Please let me know,    Shanthi

## 2022-06-22 ENCOUNTER — TELEPHONE (OUTPATIENT)
Dept: GASTROENTEROLOGY | Facility: CLINIC | Age: 87
End: 2022-06-22
Payer: MEDICARE

## 2022-06-22 ENCOUNTER — TELEPHONE (OUTPATIENT)
Dept: SURGERY | Facility: CLINIC | Age: 87
End: 2022-06-22
Payer: MEDICARE

## 2022-06-22 NOTE — TELEPHONE ENCOUNTER
Spoke with MsNazario Jania regarding scheduling appt with , appt scheduled for 6/24 at 1:30. She confirmed date and time.    ----- Message from Taylor Vega sent at 6/22/2022 10:33 AM CDT -----  Regarding: wife  .Type:  Patient Returning Call    Who Called:  Jania- wife     Who Left Message for Patient: Devaughn     Does the patient know what this is regarding?: no     Would the patient rather a call back or a response via [facility]?  Call     Best Call Back Number: [phone]

## 2022-06-22 NOTE — TELEPHONE ENCOUNTER
Attempted to contact patient to schedule an appt with Dr. Montesinos, unable to reach, left brief VM to contact office.     ----- Message from Tracy Rosenberg MA sent at 6/22/2022  9:43 AM CDT -----  Regarding: appointment  Contact: Warner Dykes [phone]  Rayo Duran, [id number].    sent  a message about seeing this patient.  Can someone reach out to him regarding an appointment.  Much appreciated!   Lina  ----- Message -----  From: Dee Luu  Sent: 6/22/2022   9:38 AM CDT  To: [facility] Staff    Type: Patient Call Back    Who called: Wife Jania    What is the request in detail: Was seen on 05-31-22, had a biopsy, and was told that he has stomach cancer. Mrs. Duran is calling to find out the next steps. States no one has called to let them know what to do. Would like to speak to nurse. Please call.     Would the patient rather a call back or a response via [facility]? Call back    Best call back number: [phone]

## 2022-06-22 NOTE — TELEPHONE ENCOUNTER
----- Message from Dee Luu sent at 6/22/2022  9:34 AM CDT -----  Contact: Warner Dykes [phone]  Type: Patient Call Back    Who called: Warner Dykes    What is the request in detail: Was seen on 05-31-22, had a biopsy, and was told that he has stomach cancer. Mrs. Duran is calling to find out the next steps. States no one has called to let them know what to do. Would like to speak to nurse. Please call.     Would the patient rather a call back or a response via [facility]? Call back    Best call back number: [phone]

## 2022-06-24 ENCOUNTER — OFFICE VISIT (OUTPATIENT)
Dept: SURGERY | Facility: CLINIC | Age: 87
End: 2022-06-24
Payer: MEDICARE

## 2022-06-24 VITALS
SYSTOLIC BLOOD PRESSURE: 122 MMHG | DIASTOLIC BLOOD PRESSURE: 83 MMHG | HEIGHT: 70 IN | BODY MASS INDEX: 24.69 KG/M2 | TEMPERATURE: 98 F | HEART RATE: 79 BPM | OXYGEN SATURATION: 97 % | WEIGHT: 172.5 LBS

## 2022-06-24 DIAGNOSIS — C16.2 MALIGNANT NEOPLASM OF BODY OF STOMACH: Primary | ICD-10-CM

## 2022-06-24 PROCEDURE — 99204 PR OFFICE/OUTPT VISIT, NEW, LEVL IV, 45-59 MIN: ICD-10-PCS | Mod: S$GLB,,, | Performed by: SURGERY

## 2022-06-24 PROCEDURE — 99204 OFFICE O/P NEW MOD 45 MIN: CPT | Mod: S$GLB,,, | Performed by: SURGERY

## 2022-06-24 RX ORDER — MUPIROCIN 20 MG/G
OINTMENT TOPICAL
Status: CANCELLED | OUTPATIENT
Start: 2022-06-24

## 2022-06-24 RX ORDER — SODIUM CHLORIDE 9 MG/ML
INJECTION, SOLUTION INTRAVENOUS CONTINUOUS
Status: CANCELLED | OUTPATIENT
Start: 2022-06-24

## 2022-06-24 NOTE — H&P (VIEW-ONLY)
History & Physical    SUBJECTIVE:     History of Present Illness:  Patient is a 86 y.o. male presents with gastric cancer recently seen and biopsied for hematemesis after EGD. He is here for evaluation and management. He has had some weight lost and decrease in his appetite.     Chief Complaint   Patient presents with    Results     Pathology report        Review of patient's allergies indicates:   Allergen Reactions    Ativan [lorazepam] Anxiety     Patient had paradoxical effect after ativan IV and was so agitated he had to be restrained and transferred to ICU for precedex.       Current Outpatient Medications   Medication Sig Dispense Refill    pantoprazole (PROTONIX) 40 MG tablet Take 1 tablet (40 mg total) by mouth 2 (two) times daily. 60 tablet 2    predniSONE (DELTASONE) 20 MG tablet Take 20 mg by mouth once daily.      sildenafiL (VIAGRA) 50 MG tablet 1 tablet.      traZODone (DESYREL) 50 MG tablet Take 1 tablet (50 mg total) by mouth every evening. 30 tablet 0    gabapentin enacarbil (HORIZANT) 600 mg TbSR Take 600 mg by mouth once daily. Take 600mg PO once daily at 5pm with food (Patient not taking: Reported on 6/24/2022) 30 tablet 5     No current facility-administered medications for this visit.       Past Medical History:   Diagnosis Date    Hypertension     Stomach ulcer     Tumor     removed from stomach     Past Surgical History:   Procedure Laterality Date    ESOPHAGOGASTRODUODENOSCOPY N/A 5/31/2022    Procedure: EGD (ESOPHAGOGASTRODUODENOSCOPY);  Surgeon: Mario Escalera MD;  Location: [facility];  Service: Endoscopy;  Laterality: N/A;     Family History   Problem Relation Age of Onset    No Known Problems Mother     No Known Problems Father     Hypertension Sister     No Known Problems Brother     No Known Problems Maternal Grandmother     No Known Problems Maternal Grandfather     No Known Problems Paternal Grandmother     No Known Problems Paternal Grandfather      Social  "History     Tobacco Use    Smoking status: Never Smoker    Smokeless tobacco: Never Used   Substance Use Topics    Alcohol use: Not Currently    Drug use: Never        Review of Systems:  Review of Systems   Constitutional: Negative.    HENT: Negative.    Eyes: Negative.    Respiratory: Negative.    Cardiovascular: Negative.    Gastrointestinal: Negative.    Endocrine: Negative.    Musculoskeletal: Negative.    Skin: Negative.    Allergic/Immunologic: Negative.    Neurological: Negative.    Hematological: Negative.    Psychiatric/Behavioral: Negative.    All other systems reviewed and are negative.      OBJECTIVE:     Vital Signs (Most Recent)  Temp: 97.8 °F (36.6 °C) (06/24/22 1324)  Pulse: 79 (06/24/22 1324)  BP: 122/83 (06/24/22 1324)  SpO2: 97 % (06/24/22 1324)  5' 10" (1.778 m)  78.3 kg (172 lb 8.2 oz)     Physical Exam:  Physical Exam  Vitals reviewed.   Constitutional:       Appearance: He is well-developed.   HENT:      Head: Normocephalic and atraumatic.      Right Ear: External ear normal.      Left Ear: External ear normal.      Nose: Nose normal.   Eyes:      Conjunctiva/sclera: Conjunctivae normal.      Pupils: Pupils are equal, round, and reactive to light.   Cardiovascular:      Rate and Rhythm: Normal rate and regular rhythm.      Heart sounds: Normal heart sounds.   Pulmonary:      Effort: Pulmonary effort is normal.      Breath sounds: Normal breath sounds.   Abdominal:      General: Bowel sounds are normal.      Palpations: Abdomen is soft.   Musculoskeletal:         General: Normal range of motion.      Cervical back: Normal range of motion and neck supple.   Skin:     General: Skin is warm and dry.   Neurological:      Mental Status: He is alert and oriented to person, place, and time.      Deep Tendon Reflexes: Reflexes are normal and symmetric.   Psychiatric:         Behavior: Behavior normal.         Thought Content: Thought content normal.         Laboratory  none    Diagnostic " Results:  none    ASSESSMENT/PLAN:     Gastric cancer    PLAN:Plan     I discussed his findings and the options for care including the need for gastrectomy with the risks explained.  I will get a medical clearance and then schedule for surgery

## 2022-06-24 NOTE — PROGRESS NOTES
History & Physical    SUBJECTIVE:     History of Present Illness:  Patient is a 86 y.o. male presents with gastric cancer recently seen and biopsied for hematemesis after EGD. He is here for evaluation and management. He has had some weight lost and decrease in his appetite.     Chief Complaint   Patient presents with    Results     Pathology report        Review of patient's allergies indicates:   Allergen Reactions    Ativan [lorazepam] Anxiety     Patient had paradoxical effect after ativan IV and was so agitated he had to be restrained and transferred to ICU for precedex.       Current Outpatient Medications   Medication Sig Dispense Refill    pantoprazole (PROTONIX) 40 MG tablet Take 1 tablet (40 mg total) by mouth 2 (two) times daily. 60 tablet 2    predniSONE (DELTASONE) 20 MG tablet Take 20 mg by mouth once daily.      sildenafiL (VIAGRA) 50 MG tablet 1 tablet.      traZODone (DESYREL) 50 MG tablet Take 1 tablet (50 mg total) by mouth every evening. 30 tablet 0    gabapentin enacarbil (HORIZANT) 600 mg TbSR Take 600 mg by mouth once daily. Take 600mg PO once daily at 5pm with food (Patient not taking: Reported on 6/24/2022) 30 tablet 5     No current facility-administered medications for this visit.       Past Medical History:   Diagnosis Date    Hypertension     Stomach ulcer     Tumor     removed from stomach     Past Surgical History:   Procedure Laterality Date    ESOPHAGOGASTRODUODENOSCOPY N/A 5/31/2022    Procedure: EGD (ESOPHAGOGASTRODUODENOSCOPY);  Surgeon: Mario Escalera MD;  Location: [facility];  Service: Endoscopy;  Laterality: N/A;     Family History   Problem Relation Age of Onset    No Known Problems Mother     No Known Problems Father     Hypertension Sister     No Known Problems Brother     No Known Problems Maternal Grandmother     No Known Problems Maternal Grandfather     No Known Problems Paternal Grandmother     No Known Problems Paternal Grandfather      Social  "History     Tobacco Use    Smoking status: Never Smoker    Smokeless tobacco: Never Used   Substance Use Topics    Alcohol use: Not Currently    Drug use: Never        Review of Systems:  Review of Systems   Constitutional: Negative.    HENT: Negative.    Eyes: Negative.    Respiratory: Negative.    Cardiovascular: Negative.    Gastrointestinal: Negative.    Endocrine: Negative.    Musculoskeletal: Negative.    Skin: Negative.    Allergic/Immunologic: Negative.    Neurological: Negative.    Hematological: Negative.    Psychiatric/Behavioral: Negative.    All other systems reviewed and are negative.      OBJECTIVE:     Vital Signs (Most Recent)  Temp: 97.8 °F (36.6 °C) (06/24/22 1324)  Pulse: 79 (06/24/22 1324)  BP: 122/83 (06/24/22 1324)  SpO2: 97 % (06/24/22 1324)  5' 10" (1.778 m)  78.3 kg (172 lb 8.2 oz)     Physical Exam:  Physical Exam  Vitals reviewed.   Constitutional:       Appearance: He is well-developed.   HENT:      Head: Normocephalic and atraumatic.      Right Ear: External ear normal.      Left Ear: External ear normal.      Nose: Nose normal.   Eyes:      Conjunctiva/sclera: Conjunctivae normal.      Pupils: Pupils are equal, round, and reactive to light.   Cardiovascular:      Rate and Rhythm: Normal rate and regular rhythm.      Heart sounds: Normal heart sounds.   Pulmonary:      Effort: Pulmonary effort is normal.      Breath sounds: Normal breath sounds.   Abdominal:      General: Bowel sounds are normal.      Palpations: Abdomen is soft.   Musculoskeletal:         General: Normal range of motion.      Cervical back: Normal range of motion and neck supple.   Skin:     General: Skin is warm and dry.   Neurological:      Mental Status: He is alert and oriented to person, place, and time.      Deep Tendon Reflexes: Reflexes are normal and symmetric.   Psychiatric:         Behavior: Behavior normal.         Thought Content: Thought content normal.         Laboratory  none    Diagnostic " Results:  none    ASSESSMENT/PLAN:     Gastric cancer    PLAN:Plan     I discussed his findings and the options for care including the need for gastrectomy with the risks explained.  I will get a medical clearance and then schedule for surgery

## 2022-06-28 ENCOUNTER — TELEPHONE (OUTPATIENT)
Dept: SURGERY | Facility: CLINIC | Age: 87
End: 2022-06-28
Payer: MEDICARE

## 2022-06-28 NOTE — TELEPHONE ENCOUNTER
"Spoke with MsNazario Coleen from [facility], explained to me they don't do CTs, but gave me two companies near by that does. Will be reaching out to get Mr. Duran scheduled.         ----- Message from Myrna Vega sent at 6/28/2022  9:36 AM CDT -----  Regarding: Coleen [facility] "  [phone]  .Type: Patient Call Back    Who called:Coleen "[facility] "    What is the request in detail: Order for CT abd was sent to [facility] and they do not perform that testing at the facility please send order to another center     Can the clinic reply by [facility]? Call back     Would the patient rather a call back or a response via [facility]?  Call back     Best call back number [phone]          "

## 2022-06-28 NOTE — TELEPHONE ENCOUNTER
Spoke with  regarding getting the CT scheduled for . Inform patient I can send the order to [facility], closer to their home. Provided her with the address and phone number to location. She verbalized understanding.        ----- Message from Dee Luu sent at 6/28/2022  8:51 AM CDT -----  Contact: Warner Dykes [phone]  Type: Patient Call Back    Who called: Wife Jania    What is the request in detail: Would like to speak to nurse in regards to surgery  is having on 07-12-22, and CT Scan. Please call.     Would the patient rather a call back or a response via [facility]? Call back    Best call back number: [phone]

## 2022-06-29 ENCOUNTER — ANESTHESIA EVENT (OUTPATIENT)
Dept: SURGERY | Facility: HOSPITAL | Age: 87
DRG: 327 | End: 2022-06-29
Payer: MEDICARE

## 2022-06-29 ENCOUNTER — HOSPITAL ENCOUNTER (OUTPATIENT)
Dept: PREADMISSION TESTING | Facility: HOSPITAL | Age: 87
Discharge: HOME | End: 2022-06-29
Attending: SURGERY
Payer: MEDICARE

## 2022-06-29 ENCOUNTER — TELEPHONE (OUTPATIENT)
Dept: HEMATOLOGY/ONCOLOGY | Facility: CLINIC | Age: 87
End: 2022-06-29
Payer: MEDICARE

## 2022-06-29 VITALS
DIASTOLIC BLOOD PRESSURE: 79 MMHG | HEIGHT: 70 IN | SYSTOLIC BLOOD PRESSURE: 122 MMHG | WEIGHT: 169.31 LBS | HEART RATE: 88 BPM | BODY MASS INDEX: 24.24 KG/M2 | TEMPERATURE: 97 F | RESPIRATION RATE: 18 BRPM | OXYGEN SATURATION: 98 %

## 2022-06-29 DIAGNOSIS — Z01.818 PREOPERATIVE TESTING: Primary | ICD-10-CM

## 2022-06-29 DIAGNOSIS — C16.2 MALIGNANT NEOPLASM OF BODY OF STOMACH: ICD-10-CM

## 2022-06-29 DIAGNOSIS — C26.9 GI CANCER: Primary | ICD-10-CM

## 2022-06-29 LAB
ALBUMIN SERPL BCP-MCNC: 3.3 G/DL (ref 3.5–5.2)
ALP SERPL-CCNC: 100 U/L (ref 55–135)
ALT SERPL W/O P-5'-P-CCNC: 6 U/L (ref 10–44)
ANION GAP SERPL CALC-SCNC: 9 MMOL/L (ref 8–16)
AST SERPL-CCNC: 13 U/L (ref 10–40)
BASOPHILS # BLD AUTO: 0.04 K/UL (ref 0–0.2)
BASOPHILS NFR BLD: 0.6 % (ref 0–1.9)
BILIRUB SERPL-MCNC: 1.3 MG/DL (ref 0.1–1)
BUN SERPL-MCNC: 10 MG/DL (ref 8–23)
CALCIUM SERPL-MCNC: 9 MG/DL (ref 8.7–10.5)
CHLORIDE SERPL-SCNC: 106 MMOL/L (ref 95–110)
CO2 SERPL-SCNC: 25 MMOL/L (ref 23–29)
CREAT SERPL-MCNC: 1.1 MG/DL (ref 0.5–1.4)
DIFFERENTIAL METHOD: ABNORMAL
EOSINOPHIL # BLD AUTO: 0.2 K/UL (ref 0–0.5)
EOSINOPHIL NFR BLD: 2.3 % (ref 0–8)
ERYTHROCYTE [DISTWIDTH] IN BLOOD BY AUTOMATED COUNT: 19.5 % (ref 11.5–14.5)
EST. GFR  (AFRICAN AMERICAN): >60 ML/MIN/1.73 M^2
EST. GFR  (NON AFRICAN AMERICAN): >60 ML/MIN/1.73 M^2
GLUCOSE SERPL-MCNC: 101 MG/DL (ref 70–110)
HCT VFR BLD AUTO: 31.6 % (ref 40–54)
HGB BLD-MCNC: 9.4 G/DL (ref 14–18)
IMM GRANULOCYTES # BLD AUTO: 0.02 K/UL (ref 0–0.04)
IMM GRANULOCYTES NFR BLD AUTO: 0.3 % (ref 0–0.5)
LYMPHOCYTES # BLD AUTO: 1.3 K/UL (ref 1–4.8)
LYMPHOCYTES NFR BLD: 18.9 % (ref 18–48)
MCH RBC QN AUTO: 24.2 PG (ref 27–31)
MCHC RBC AUTO-ENTMCNC: 29.7 G/DL (ref 32–36)
MCV RBC AUTO: 81 FL (ref 82–98)
MONOCYTES # BLD AUTO: 0.6 K/UL (ref 0.3–1)
MONOCYTES NFR BLD: 8.6 % (ref 4–15)
NEUTROPHILS # BLD AUTO: 4.8 K/UL (ref 1.8–7.7)
NEUTROPHILS NFR BLD: 69.3 % (ref 38–73)
NRBC BLD-RTO: 0 /100 WBC
PLATELET # BLD AUTO: 333 K/UL (ref 150–450)
PMV BLD AUTO: 9.8 FL (ref 9.2–12.9)
POTASSIUM SERPL-SCNC: 4.2 MMOL/L (ref 3.5–5.1)
PROT SERPL-MCNC: 6.8 G/DL (ref 6–8.4)
RBC # BLD AUTO: 3.89 M/UL (ref 4.6–6.2)
SODIUM SERPL-SCNC: 140 MMOL/L (ref 136–145)
WBC # BLD AUTO: 6.87 K/UL (ref 3.9–12.7)

## 2022-06-29 PROCEDURE — 85025 COMPLETE CBC W/AUTO DIFF WBC: CPT | Performed by: SURGERY

## 2022-06-29 PROCEDURE — 80053 COMPREHEN METABOLIC PANEL: CPT | Performed by: SURGERY

## 2022-06-29 PROCEDURE — 36415 COLL VENOUS BLD VENIPUNCTURE: CPT | Performed by: SURGERY

## 2022-06-29 NOTE — DISCHARGE INSTRUCTIONS
Before 7 AM, enter through the Emergency Entrance..   After 7 AM enter through the Main Entrance.      Your procedure  is scheduled for __7/12/2022________.    Call [phone] between 2pm and 5pm on __7/11/2022_____to find out your arrival time for the day of surgery.    You may use the main entrance to the hospital on the [facility] side, or the entrance that is next to the [facility].    You may have two visitors.  Visiting hours for non-COVID-19 patients expanded to 24/7 (still restricted to one visitor)  Youth visitation changed from age 18 to age 12.      You will be going to the Same Day Surgery Unit on the 2nd floor of the hospital.    Important instructions:  Do not eat anything after midnight.  You may have plain water, non carbonated.  You may also have Gatorade or Powerade after midnight.    Stop all fluids 2 hours before your surgery.    It is okay to brush your teeth.  Do not have gum, candy or mints.    SEE MEDICATION SHEET.   TAKE MEDICATIONS AS DIRECTED WITH SIPS OF WATER.      STOP taking Aspirin, Ibuprofen,  Advil, Motrin, Mobic(meloxicam), Aleve (naproxen), Fish oil, and Vitamin E for at least 7 days before your surgery.     You may take Tylenol if needed which is not a blood thinner.    Please shower the night before and the morning of your surgery.      Use Hibiclens soap as instructed by your pre op nurse.   Please place clean linens on your bed the night before surgery. Please wear fresh clean clothing after each shower.    No shaving of procedural area at least 4-5 days before surgery due to increased risk of skin irritation and/or possible infection.    Contact lenses and removable denture work may not be worn during your procedure.    You may wear deodorant only. If you are having breast surgery, do not wear deodorant on the operative side.    Do not wear powder, body lotion, perfume/cologne or make-up.    Do not wear any jewelry or have any metal on your body.    You will be asked  to remove any dentures or partials for the procedure.    If you are going home on the same day of surgery, you must arrange for a family member or a friend to drive you home.  Public transportation is prohibited.  You will not be able to drive home if you were given anesthesia or sedation.    Patients who want to have their Post-op prescriptions filled from our in-house [facility], bring a Credit/Debit Card  or cash with you. A co-pay may be required.  The pharmacy closes at 5:30 pm.    Wear loose fitting clothes allowing for bandages.    Please leave money and valuables home.      You may bring your cell phone.    Call the doctor if fever or illness should occur before your surgery.    Call [phone] to contact us here if needed.

## 2022-06-29 NOTE — TELEPHONE ENCOUNTER
6-28-22  Tc  To pt Spoke w/ spouse per his request to discuss schedulinga an appt for oncological evaluation  She stated they lived in [place] which is about 2 1/2 hours away from our office  She said she will transport her  to our facility but if there was an [facility] facility closer to them they would appreciate being sent here Advised her nurse would investigate  That matter    6-29-22    After research was able to locate an [facility] cancer center near patients home   TC to [facility] [place]  @ [phone]      Spoke with Hazel  Informed her that pt needed to  be seen by Oncologist for newly diagnosed gastric ca  Wife informed nurse that pt was transferred to our region form [facility] [place] with a GI bleed as this was the only hospital at the time  that had an available bed  Pt will have to travel 2 1/2 hours to get to our facility She would like to stay closer to home but also stated she will go wherever she has to to take care of her   Pt is currently scheduled for [facility] at [facility] [place] location w/ Dr mejia     [facility] is willing to accept patient   Although pt has not been scheduled with any of our physicians at this time Dr Kong agreed to place a referral for pt to be seen in [place] per there request     Per Hazel at [facility] they have no surgical oncologist on staff so they recommend pt proceed with surgery as scheduled in the [place] area She stated her physician is out of the office at this time and is due back in next week  Case will be reviewed with him and then he will be  contacted by their office  to schedule an appt    Pts' spouse advised of above  She is very receptive to being seen for treatment at the facility in [place] and she will proceed with surgery at [facility] by Dr Mejia    Time 43 minutes     6-30-22    TC fromTtracey at [facility] in [place]  They  received referral and records faxed on pt  They will contact pt directly and schedule appt They will notify patient that they are working on scheduling him for an appt

## 2022-06-29 NOTE — ANESTHESIA PREPROCEDURE EVALUATION
06/29/2022  Rayo Duran Sr. is a 86 y.o., male scheduled for GASTRECTOMY (N/A Abdomen) on 7/12/2022.    Past Medical History:   Diagnosis Date    Dizziness     Hypertension     Stomach ulcer     Tumor     removed from stomach       Past Surgical History:   Procedure Laterality Date    APPENDECTOMY      CHOLECYSTECTOMY      ESOPHAGOGASTRODUODENOSCOPY N/A 5/31/2022    Procedure: EGD (ESOPHAGOGASTRODUODENOSCOPY);  Surgeon: Mario Escalera MD;  Location: [facility];  Service: Endoscopy;  Laterality: N/A;    GASTRECTOMY      PARTIAL GASTRECTOMY      SHOULDER SURGERY Right      CXR 5/30/2022:  Impression:     No acute findings        Electronically signed by: Marian Godinez MD  Date:                                            05/30/2022  Time:                                           08:28      Pre-op Assessment    I have reviewed the Patient Summary Reports.     I have reviewed the Nursing Notes. I have reviewed the NPO Status.   I have reviewed the Medications.     Review of Systems  Anesthesia Hx:  No problems with previous Anesthesia  History of prior surgery of interest to airway management or planning: Denies Family Hx of Anesthesia complications.   Denies Personal Hx of Anesthesia complications.   Social:  No Alcohol Use, Former Smoker    Hematology/Oncology:  Hematology Normal      Current/Recent Cancer. Oncology Comments: Gastric cancer    EENT/Dental:EENT/Dental Normal   Cardiovascular:   Hypertension EKG 5/30/2022:    Normal sinus rhythm   Normal ECG   No previous ECGs available   Confirmed by CONSTANTINE CAPPS MD ([id number]) on 5/30/2022 10:55:38 AM  Functional Capacity good / => 4 METS    Pulmonary:  Pulmonary Normal    Renal/:  Renal/ Normal     Hepatic/GI:   GERD Stomach ulcer   Musculoskeletal:  Musculoskeletal Normal    Neurological:  Neurology Normal    Endocrine:  Endocrine Normal   "  Dermatological:  Skin Normal    Psych:  Psychiatric Normal           Physical Exam  General: Cooperative, Well nourished, Alert and Combative    Airway:  Mouth Opening: Normal  TM Distance: Normal  Tongue: Normal  Neck ROM: Normal ROM    Dental:  Edentulous    Chest/Lungs:  Normal Respiratory Rate    Heart:  Rate: Normal  Sounds: Normal        Anesthesia Plan  Type of Anesthesia, risks & benefits discussed:    Anesthesia Type: Gen ETT  Intra-op Monitoring Plan: Standard ASA Monitors  Induction:  IV  Airway Plan: Direct and Video  Informed Consent: Informed consent signed with the Patient and all parties understand the risks and agree with anesthesia plan.  All questions answered.   ASA Score: 3  Anesthesia Plan Notes: Seen by PCP "Patient is clear for surgery with moderate risk."    Ready For Surgery From Anesthesia Perspective.     .      " n/a

## 2022-06-30 DIAGNOSIS — C26.9 GI CANCER: Primary | ICD-10-CM

## 2022-07-06 ENCOUNTER — HOSPITAL ENCOUNTER (OUTPATIENT)
Dept: RADIOLOGY | Facility: HOSPITAL | Age: 87
Discharge: HOME | End: 2022-07-06
Attending: NURSE PRACTITIONER
Payer: MEDICARE

## 2022-07-06 DIAGNOSIS — Z01.818 PRE-OPERATIVE CLEARANCE: ICD-10-CM

## 2022-07-06 PROCEDURE — 71046 X-RAY EXAM CHEST 2 VIEWS: CPT | Mod: TC

## 2022-07-12 ENCOUNTER — ANESTHESIA (OUTPATIENT)
Dept: SURGERY | Facility: HOSPITAL | Age: 87
DRG: 327 | End: 2022-07-12
Payer: MEDICARE

## 2022-07-12 ENCOUNTER — HOSPITAL ENCOUNTER (INPATIENT)
Facility: HOSPITAL | Age: 87
LOS: 4 days | Discharge: HOME HEALTH | DRG: 327 | End: 2022-07-16
Attending: SURGERY | Admitting: SURGERY
Payer: MEDICARE

## 2022-07-12 DIAGNOSIS — C16.2 MALIGNANT NEOPLASM OF BODY OF STOMACH: Primary | ICD-10-CM

## 2022-07-12 DIAGNOSIS — Z01.818 PREOPERATIVE TESTING: ICD-10-CM

## 2022-07-12 LAB — POCT GLUCOSE: 119 MG/DL (ref 70–110)

## 2022-07-12 PROCEDURE — 43621 PR REMOVAL STOMACH,ROUX-EN-Y: ICD-10-PCS | Mod: ,,, | Performed by: SURGERY

## 2022-07-12 PROCEDURE — 88309 TISSUE EXAM BY PATHOLOGIST: CPT | Performed by: PATHOLOGY

## 2022-07-12 PROCEDURE — 63600175 PHARM REV CODE 636 W HCPCS: Performed by: SURGERY

## 2022-07-12 PROCEDURE — 88342 IMHCHEM/IMCYTCHM 1ST ANTB: CPT | Performed by: PATHOLOGY

## 2022-07-12 PROCEDURE — 25000003 PHARM REV CODE 250: Performed by: SURGERY

## 2022-07-12 PROCEDURE — 86850 RBC ANTIBODY SCREEN: CPT | Performed by: SURGERY

## 2022-07-12 PROCEDURE — 86900 BLOOD TYPING SEROLOGIC ABO: CPT | Mod: 91 | Performed by: SURGERY

## 2022-07-12 PROCEDURE — 25000003 PHARM REV CODE 250: Performed by: STUDENT IN AN ORGANIZED HEALTH CARE EDUCATION/TRAINING PROGRAM

## 2022-07-12 PROCEDURE — 88309 PR  SURG PATH,LEVEL VI: ICD-10-PCS | Mod: 26,,, | Performed by: PATHOLOGY

## 2022-07-12 PROCEDURE — 63600175 PHARM REV CODE 636 W HCPCS: Performed by: ANESTHESIOLOGY

## 2022-07-12 PROCEDURE — 11000001 HC ACUTE MED/SURG PRIVATE ROOM

## 2022-07-12 PROCEDURE — 63600175 PHARM REV CODE 636 W HCPCS: Performed by: STUDENT IN AN ORGANIZED HEALTH CARE EDUCATION/TRAINING PROGRAM

## 2022-07-12 PROCEDURE — 71000033 HC RECOVERY, INTIAL HOUR: Performed by: SURGERY

## 2022-07-12 PROCEDURE — 37000009 HC ANESTHESIA EA ADD 15 MINS: Performed by: SURGERY

## 2022-07-12 PROCEDURE — D9220A PRA ANESTHESIA: ICD-10-PCS | Mod: CRNA,,, | Performed by: STUDENT IN AN ORGANIZED HEALTH CARE EDUCATION/TRAINING PROGRAM

## 2022-07-12 PROCEDURE — D9220A PRA ANESTHESIA: ICD-10-PCS | Mod: ANES,,, | Performed by: ANESTHESIOLOGY

## 2022-07-12 PROCEDURE — 63600175 PHARM REV CODE 636 W HCPCS: Performed by: NURSE ANESTHETIST, CERTIFIED REGISTERED

## 2022-07-12 PROCEDURE — 36000709 HC OR TIME LEV III EA ADD 15 MIN: Performed by: SURGERY

## 2022-07-12 PROCEDURE — 88342 CHG IMMUNOCYTOCHEMISTRY: ICD-10-PCS | Mod: 26,,, | Performed by: PATHOLOGY

## 2022-07-12 PROCEDURE — 43621 REMOVAL OF STOMACH: CPT | Mod: ,,, | Performed by: SURGERY

## 2022-07-12 PROCEDURE — 25000003 PHARM REV CODE 250: Performed by: ANESTHESIOLOGY

## 2022-07-12 PROCEDURE — D9220A PRA ANESTHESIA: Mod: ANES,,, | Performed by: ANESTHESIOLOGY

## 2022-07-12 PROCEDURE — 88331 PATH CONSLTJ SURG 1 BLK 1SPC: CPT | Mod: 26,,, | Performed by: PATHOLOGY

## 2022-07-12 PROCEDURE — 86901 BLOOD TYPING SEROLOGIC RH(D): CPT | Mod: 91 | Performed by: SURGERY

## 2022-07-12 PROCEDURE — 88342 IMHCHEM/IMCYTCHM 1ST ANTB: CPT | Mod: 26,,, | Performed by: PATHOLOGY

## 2022-07-12 PROCEDURE — D9220A PRA ANESTHESIA: Mod: CRNA,,, | Performed by: STUDENT IN AN ORGANIZED HEALTH CARE EDUCATION/TRAINING PROGRAM

## 2022-07-12 PROCEDURE — 88332 PATH CONSLTJ SURG EA ADD BLK: CPT | Mod: 26,,, | Performed by: PATHOLOGY

## 2022-07-12 PROCEDURE — 27201423 OPTIME MED/SURG SUP & DEVICES STERILE SUPPLY: Performed by: SURGERY

## 2022-07-12 PROCEDURE — 25000003 PHARM REV CODE 250: Performed by: NURSE ANESTHETIST, CERTIFIED REGISTERED

## 2022-07-12 PROCEDURE — 88309 TISSUE EXAM BY PATHOLOGIST: CPT | Mod: 26,,, | Performed by: PATHOLOGY

## 2022-07-12 PROCEDURE — V2790 AMNIOTIC MEMBRANE: HCPCS | Performed by: SURGERY

## 2022-07-12 PROCEDURE — 86920 COMPATIBILITY TEST SPIN: CPT | Performed by: STUDENT IN AN ORGANIZED HEALTH CARE EDUCATION/TRAINING PROGRAM

## 2022-07-12 PROCEDURE — 88332 PATH CONSLTJ SURG EA ADD BLK: CPT | Performed by: PATHOLOGY

## 2022-07-12 PROCEDURE — 36415 COLL VENOUS BLD VENIPUNCTURE: CPT | Performed by: SURGERY

## 2022-07-12 PROCEDURE — 88331 PATH CONSLTJ SURG 1 BLK 1SPC: CPT | Performed by: PATHOLOGY

## 2022-07-12 PROCEDURE — 86900 BLOOD TYPING SEROLOGIC ABO: CPT | Performed by: SURGERY

## 2022-07-12 PROCEDURE — 86850 RBC ANTIBODY SCREEN: CPT | Mod: 91 | Performed by: SURGERY

## 2022-07-12 PROCEDURE — 37000008 HC ANESTHESIA 1ST 15 MINUTES: Performed by: SURGERY

## 2022-07-12 PROCEDURE — 71000039 HC RECOVERY, EACH ADD'L HOUR: Performed by: SURGERY

## 2022-07-12 PROCEDURE — 36000708 HC OR TIME LEV III 1ST 15 MIN: Performed by: SURGERY

## 2022-07-12 PROCEDURE — 88332 PR  PATH CONSULT IN SURG,W ADDN FRZ SEC: ICD-10-PCS | Mod: 26,,, | Performed by: PATHOLOGY

## 2022-07-12 PROCEDURE — 88331 PR  PATH CONSULT IN SURG,W FRZ SEC: ICD-10-PCS | Mod: 26,,, | Performed by: PATHOLOGY

## 2022-07-12 DEVICE — PATCH AMNION DUAL LAYER 4X6CM: Type: IMPLANTABLE DEVICE | Site: ABDOMEN | Status: FUNCTIONAL

## 2022-07-12 RX ORDER — ENOXAPARIN SODIUM 100 MG/ML
40 INJECTION SUBCUTANEOUS EVERY 24 HOURS
Status: DISCONTINUED | OUTPATIENT
Start: 2022-07-13 | End: 2022-07-12 | Stop reason: SDUPTHER

## 2022-07-12 RX ORDER — ENOXAPARIN SODIUM 100 MG/ML
30 INJECTION SUBCUTANEOUS EVERY 24 HOURS
Status: DISCONTINUED | OUTPATIENT
Start: 2022-07-13 | End: 2022-07-12

## 2022-07-12 RX ORDER — ACETAMINOPHEN 500 MG
1000 TABLET ORAL
Status: DISCONTINUED | OUTPATIENT
Start: 2022-07-12 | End: 2022-07-12

## 2022-07-12 RX ORDER — PROPOFOL 10 MG/ML
VIAL (ML) INTRAVENOUS
Status: DISCONTINUED | OUTPATIENT
Start: 2022-07-12 | End: 2022-07-12

## 2022-07-12 RX ORDER — PROCHLORPERAZINE EDISYLATE 5 MG/ML
5 INJECTION INTRAMUSCULAR; INTRAVENOUS EVERY 6 HOURS PRN
Status: DISCONTINUED | OUTPATIENT
Start: 2022-07-12 | End: 2022-07-16 | Stop reason: HOSPADM

## 2022-07-12 RX ORDER — FAMOTIDINE 10 MG/ML
20 INJECTION INTRAVENOUS 2 TIMES DAILY
Status: DISCONTINUED | OUTPATIENT
Start: 2022-07-12 | End: 2022-07-13

## 2022-07-12 RX ORDER — ONDANSETRON 2 MG/ML
INJECTION INTRAMUSCULAR; INTRAVENOUS
Status: DISCONTINUED | OUTPATIENT
Start: 2022-07-12 | End: 2022-07-12

## 2022-07-12 RX ORDER — BUPIVACAINE HYDROCHLORIDE 2.5 MG/ML
INJECTION, SOLUTION INFILTRATION; PERINEURAL
Status: DISCONTINUED | OUTPATIENT
Start: 2022-07-12 | End: 2022-07-12

## 2022-07-12 RX ORDER — MUPIROCIN 20 MG/G
1 OINTMENT TOPICAL 2 TIMES DAILY
Status: DISCONTINUED | OUTPATIENT
Start: 2022-07-12 | End: 2022-07-16 | Stop reason: HOSPADM

## 2022-07-12 RX ORDER — HYDROMORPHONE HYDROCHLORIDE 2 MG/ML
0.2 INJECTION, SOLUTION INTRAMUSCULAR; INTRAVENOUS; SUBCUTANEOUS EVERY 5 MIN PRN
Status: DISCONTINUED | OUTPATIENT
Start: 2022-07-12 | End: 2022-07-12 | Stop reason: HOSPADM

## 2022-07-12 RX ORDER — ENOXAPARIN SODIUM 100 MG/ML
40 INJECTION SUBCUTANEOUS EVERY 24 HOURS
Status: DISCONTINUED | OUTPATIENT
Start: 2022-07-13 | End: 2022-07-16 | Stop reason: HOSPADM

## 2022-07-12 RX ORDER — LIDOCAINE HYDROCHLORIDE 10 MG/ML
1 INJECTION, SOLUTION EPIDURAL; INFILTRATION; INTRACAUDAL; PERINEURAL ONCE
Status: DISCONTINUED | OUTPATIENT
Start: 2022-07-12 | End: 2022-07-12

## 2022-07-12 RX ORDER — ACETAMINOPHEN 10 MG/ML
1000 INJECTION, SOLUTION INTRAVENOUS ONCE
Status: COMPLETED | OUTPATIENT
Start: 2022-07-12 | End: 2022-07-12

## 2022-07-12 RX ORDER — ALVIMOPAN 12 MG/1
12 CAPSULE ORAL 2 TIMES DAILY
Status: DISCONTINUED | OUTPATIENT
Start: 2022-07-12 | End: 2022-07-16

## 2022-07-12 RX ORDER — CEFAZOLIN SODIUM 2 G/50ML
2 SOLUTION INTRAVENOUS
Status: COMPLETED | OUTPATIENT
Start: 2022-07-12 | End: 2022-07-12

## 2022-07-12 RX ORDER — SODIUM CHLORIDE 9 MG/ML
INJECTION, SOLUTION INTRAVENOUS CONTINUOUS
Status: DISCONTINUED | OUTPATIENT
Start: 2022-07-12 | End: 2022-07-12

## 2022-07-12 RX ORDER — ROCURONIUM BROMIDE 10 MG/ML
INJECTION, SOLUTION INTRAVENOUS
Status: DISCONTINUED | OUTPATIENT
Start: 2022-07-12 | End: 2022-07-12

## 2022-07-12 RX ORDER — ONDANSETRON 2 MG/ML
4 INJECTION INTRAMUSCULAR; INTRAVENOUS EVERY 6 HOURS PRN
Status: DISCONTINUED | OUTPATIENT
Start: 2022-07-12 | End: 2022-07-16 | Stop reason: HOSPADM

## 2022-07-12 RX ORDER — LIDOCAINE HYDROCHLORIDE 20 MG/ML
INJECTION INTRAVENOUS
Status: DISCONTINUED | OUTPATIENT
Start: 2022-07-12 | End: 2022-07-12

## 2022-07-12 RX ORDER — PHENYLEPHRINE HYDROCHLORIDE 10 MG/ML
INJECTION INTRAVENOUS
Status: DISCONTINUED | OUTPATIENT
Start: 2022-07-12 | End: 2022-07-12

## 2022-07-12 RX ORDER — SODIUM CHLORIDE 0.9 % (FLUSH) 0.9 %
10 SYRINGE (ML) INJECTION
Status: DISCONTINUED | OUTPATIENT
Start: 2022-07-12 | End: 2022-07-12 | Stop reason: HOSPADM

## 2022-07-12 RX ORDER — MUPIROCIN 20 MG/G
OINTMENT TOPICAL
Status: DISCONTINUED | OUTPATIENT
Start: 2022-07-12 | End: 2022-07-12

## 2022-07-12 RX ORDER — SUCCINYLCHOLINE CHLORIDE 20 MG/ML
INJECTION INTRAMUSCULAR; INTRAVENOUS
Status: DISCONTINUED | OUTPATIENT
Start: 2022-07-12 | End: 2022-07-12

## 2022-07-12 RX ORDER — DEXAMETHASONE SODIUM PHOSPHATE 4 MG/ML
INJECTION, SOLUTION INTRA-ARTICULAR; INTRALESIONAL; INTRAMUSCULAR; INTRAVENOUS; SOFT TISSUE
Status: DISCONTINUED | OUTPATIENT
Start: 2022-07-12 | End: 2022-07-12

## 2022-07-12 RX ORDER — HALOPERIDOL 5 MG/ML
0.5 INJECTION INTRAMUSCULAR EVERY 10 MIN PRN
Status: DISCONTINUED | OUTPATIENT
Start: 2022-07-12 | End: 2022-07-12 | Stop reason: HOSPADM

## 2022-07-12 RX ORDER — HYDROMORPHONE HCL IN 0.9% NACL 6 MG/30 ML
PATIENT CONTROLLED ANALGESIA SYRINGE INTRAVENOUS CONTINUOUS
Status: DISCONTINUED | OUTPATIENT
Start: 2022-07-12 | End: 2022-07-14

## 2022-07-12 RX ORDER — BUPIVACAINE HYDROCHLORIDE 2.5 MG/ML
INJECTION, SOLUTION EPIDURAL; INFILTRATION; INTRACAUDAL
Status: DISCONTINUED | OUTPATIENT
Start: 2022-07-12 | End: 2022-07-12

## 2022-07-12 RX ORDER — NALOXONE HCL 0.4 MG/ML
0.02 VIAL (ML) INJECTION
Status: DISCONTINUED | OUTPATIENT
Start: 2022-07-12 | End: 2022-07-16 | Stop reason: HOSPADM

## 2022-07-12 RX ORDER — FENTANYL CITRATE 50 UG/ML
INJECTION, SOLUTION INTRAMUSCULAR; INTRAVENOUS
Status: DISCONTINUED | OUTPATIENT
Start: 2022-07-12 | End: 2022-07-12

## 2022-07-12 RX ORDER — MORPHINE SULFATE 4 MG/ML
5 INJECTION, SOLUTION INTRAMUSCULAR; INTRAVENOUS EVERY 4 HOURS PRN
Status: DISCONTINUED | OUTPATIENT
Start: 2022-07-12 | End: 2022-07-12

## 2022-07-12 RX ORDER — ACETAMINOPHEN 10 MG/ML
1000 INJECTION, SOLUTION INTRAVENOUS ONCE
Status: DISCONTINUED | OUTPATIENT
Start: 2022-07-12 | End: 2022-07-12 | Stop reason: SDUPTHER

## 2022-07-12 RX ORDER — LIDOCAINE 50 MG/G
1 PATCH TOPICAL
Status: DISCONTINUED | OUTPATIENT
Start: 2022-07-12 | End: 2022-07-15

## 2022-07-12 RX ORDER — SODIUM CHLORIDE, SODIUM LACTATE, POTASSIUM CHLORIDE, CALCIUM CHLORIDE 600; 310; 30; 20 MG/100ML; MG/100ML; MG/100ML; MG/100ML
INJECTION, SOLUTION INTRAVENOUS CONTINUOUS
Status: DISCONTINUED | OUTPATIENT
Start: 2022-07-12 | End: 2022-07-16 | Stop reason: HOSPADM

## 2022-07-12 RX ORDER — ONDANSETRON 2 MG/ML
4 INJECTION INTRAMUSCULAR; INTRAVENOUS DAILY PRN
Status: DISCONTINUED | OUTPATIENT
Start: 2022-07-12 | End: 2022-07-12 | Stop reason: HOSPADM

## 2022-07-12 RX ADMIN — HYDROMORPHONE HYDROCHLORIDE 0.2 MG: 2 INJECTION, SOLUTION INTRAMUSCULAR; INTRAVENOUS; SUBCUTANEOUS at 04:07

## 2022-07-12 RX ADMIN — FENTANYL CITRATE 50 MCG: 50 INJECTION, SOLUTION INTRAMUSCULAR; INTRAVENOUS at 02:07

## 2022-07-12 RX ADMIN — PROPOFOL 20 MG: 10 INJECTION, EMULSION INTRAVENOUS at 02:07

## 2022-07-12 RX ADMIN — ONDANSETRON 4 MG: 2 INJECTION, SOLUTION INTRAMUSCULAR; INTRAVENOUS at 03:07

## 2022-07-12 RX ADMIN — CEFAZOLIN SODIUM 2 G: 2 SOLUTION INTRAVENOUS at 01:07

## 2022-07-12 RX ADMIN — Medication: at 05:07

## 2022-07-12 RX ADMIN — SUCCINYLCHOLINE CHLORIDE 60 MG: 20 INJECTION, SOLUTION INTRAMUSCULAR; INTRAVENOUS at 01:07

## 2022-07-12 RX ADMIN — ROCURONIUM BROMIDE 30 MG: 10 INJECTION, SOLUTION INTRAVENOUS at 01:07

## 2022-07-12 RX ADMIN — FENTANYL CITRATE 50 MCG: 50 INJECTION, SOLUTION INTRAMUSCULAR; INTRAVENOUS at 01:07

## 2022-07-12 RX ADMIN — FAMOTIDINE 20 MG: 10 INJECTION, SOLUTION INTRAVENOUS at 08:07

## 2022-07-12 RX ADMIN — FENTANYL CITRATE 25 MCG: 50 INJECTION, SOLUTION INTRAMUSCULAR; INTRAVENOUS at 03:07

## 2022-07-12 RX ADMIN — ALVIMOPAN 12 MG: 12 CAPSULE ORAL at 08:07

## 2022-07-12 RX ADMIN — ESMOLOL HYDROCHLORIDE 10 MG: 10 INJECTION INTRAVENOUS at 01:07

## 2022-07-12 RX ADMIN — HYDROMORPHONE HYDROCHLORIDE 0.2 MG: 2 INJECTION, SOLUTION INTRAMUSCULAR; INTRAVENOUS; SUBCUTANEOUS at 05:07

## 2022-07-12 RX ADMIN — DEXAMETHASONE SODIUM PHOSPHATE 4 MG: 4 INJECTION, SOLUTION INTRAMUSCULAR; INTRAVENOUS at 02:07

## 2022-07-12 RX ADMIN — SODIUM CHLORIDE, SODIUM LACTATE, POTASSIUM CHLORIDE, AND CALCIUM CHLORIDE: .6; .31; .03; .02 INJECTION, SOLUTION INTRAVENOUS at 05:07

## 2022-07-12 RX ADMIN — MUPIROCIN 1 G: 20 OINTMENT TOPICAL at 08:07

## 2022-07-12 RX ADMIN — SODIUM CHLORIDE: 0.9 INJECTION, SOLUTION INTRAVENOUS at 10:07

## 2022-07-12 RX ADMIN — ESMOLOL HYDROCHLORIDE 10 MG: 10 INJECTION INTRAVENOUS at 02:07

## 2022-07-12 RX ADMIN — PHENYLEPHRINE HYDROCHLORIDE 100 MCG: 10 INJECTION INTRAVENOUS at 02:07

## 2022-07-12 RX ADMIN — PROPOFOL 100 MG: 10 INJECTION, EMULSION INTRAVENOUS at 01:07

## 2022-07-12 RX ADMIN — MUPIROCIN: 20 OINTMENT TOPICAL at 10:07

## 2022-07-12 RX ADMIN — SUGAMMADEX 200 MG: 100 INJECTION, SOLUTION INTRAVENOUS at 04:07

## 2022-07-12 RX ADMIN — LIDOCAINE HYDROCHLORIDE 60 MG: 20 INJECTION, SOLUTION INTRAVENOUS at 01:07

## 2022-07-12 RX ADMIN — ROCURONIUM BROMIDE 20 MG: 10 INJECTION, SOLUTION INTRAVENOUS at 02:07

## 2022-07-12 RX ADMIN — ROCURONIUM BROMIDE 30 MG: 10 INJECTION, SOLUTION INTRAVENOUS at 03:07

## 2022-07-12 RX ADMIN — SODIUM CHLORIDE: 0.9 INJECTION, SOLUTION INTRAVENOUS at 01:07

## 2022-07-12 RX ADMIN — SODIUM CHLORIDE, SODIUM LACTATE, POTASSIUM CHLORIDE, AND CALCIUM CHLORIDE: .6; .31; .03; .02 INJECTION, SOLUTION INTRAVENOUS at 01:07

## 2022-07-12 RX ADMIN — ACETAMINOPHEN 1000 MG: 10 INJECTION INTRAVENOUS at 04:07

## 2022-07-12 NOTE — TRANSFER OF CARE
Anesthesia Transfer of Care Note    Patient: Rayo Duran     Procedure(s) Performed: Procedure(s) (LRB):  GASTRECTOMY (N/A)    Patient location: PACU    Anesthesia Type: general    Transport from OR: Transported from OR on 6-10 L/min O2 by face mask with adequate spontaneous ventilation    Post pain: pain needs to be addressed    Post assessment: no apparent anesthetic complications    Post vital signs: stable    Level of consciousness: awake    Nausea/Vomiting: no nausea/vomiting    Complications: none    Transfer of care protocol was followed      Last vitals:   Visit Vitals  /85 (BP Location: Right arm, Patient Position: Lying)   Pulse 92   Temp 36.4 °C (97.5 °F) (Oral)   Resp 16   Wt 78.3 kg (172 lb 9 oz)   SpO2 100%   BMI 24.76 kg/m²

## 2022-07-12 NOTE — OP NOTE
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    Description of Technical Procedures:  Patient was taken to the operating room placed on operating table supine position.  Under adequate general anesthesia prepped and draped around his abdomen usual sterile fashion.  Previous incision was used into his patient's abdominal cavity no evidence of any widespread metastasis or free fluid that could be appreciated.  The stomach was then uncovered by lysing some adhesions allowing access into of the left upper quadrant.  A wound protector in this and a self retaining retractor was used.  Stomach was identified dissected free from surrounding tissue some palpable nodes could be felt in the gastric mesentery.  Proximally to 3 cm above the anastomosis the stomach was divided.  Further dissection was done circumferentially around the anastomosis on both sides of the small intestines.  Small intestine was then delivered through the retrocolic defect.  Small bowel was then divided x2 and specimen was removed sent to pathology for analysis at this point a elementary limb was then placed up to the cut edge of the stomach and a jejunojejunostomy was then formed 1 infracolic area.  This was done so with staples.  Of the alimentary limb was then used for a gastrojejunostomy and this was done so with staples as well.  NG tube was  checked to make sure that it was in place.  The frozen section analysis came back negative for the gastric edge.  Human allograft tissue was placed around the anastomosis for buttressing.  Wound was then closed in layers with absorbable suture.  Skin was closed skin clips a bandage was applied patient was awakened transported to the recovery room in satisfactory condition.    Significant Surgical Tasks Conducted by the Assistant(s), if Applicable:  Greater than 50%    Estimated Blood Loss (EBL): 50 mL           Implants:   Implant Name Type Inv. Item Serial No.  Lot No. LRB No. Used Action   [id number]  PATCH AMNION DUAL LAYER [id number] RV ID  N/A 1 Implanted   [id number]  PATCH AMNION DUAL LAYER [id number] RV ID  N/A 1 Implanted       Specimens:   Specimen (24h ago, onward)             Start     Ordered    07/12/22 1453  Specimen to Pathology, Surgery General Surgery  Once        Comments: Pre-op Diagnosis: Malignant neoplasm of body of stomach [C16.2]Procedure(s):GASTRECTOMY Number of specimens: 1Name of specimens: STOMACH AND SMALL BOWEL-STITCH ON GASTRIC MARGIN     References:    Click here for ordering Quick Tip   Question Answer Comment   Procedure Type: General Surgery    Specimen Class: Known or suspected malignancy    Which provider would you like to cc? OSIEL PENA    Release to patient Immediate        07/12/22 1453                        Condition: Good    Disposition: PACU - hemodynamically stable.    Attestation: I was present and scrubbed for the entire procedure.

## 2022-07-13 LAB
ALBUMIN SERPL BCP-MCNC: 3 G/DL (ref 3.5–5.2)
ALP SERPL-CCNC: 97 U/L (ref 55–135)
ALT SERPL W/O P-5'-P-CCNC: 12 U/L (ref 10–44)
ANION GAP SERPL CALC-SCNC: 11 MMOL/L (ref 8–16)
AST SERPL-CCNC: 23 U/L (ref 10–40)
BASOPHILS # BLD AUTO: 0.01 K/UL (ref 0–0.2)
BASOPHILS NFR BLD: 0.1 % (ref 0–1.9)
BILIRUB SERPL-MCNC: 1.4 MG/DL (ref 0.1–1)
BUN SERPL-MCNC: 15 MG/DL (ref 8–23)
CALCIUM SERPL-MCNC: 8.6 MG/DL (ref 8.7–10.5)
CHLORIDE SERPL-SCNC: 107 MMOL/L (ref 95–110)
CO2 SERPL-SCNC: 21 MMOL/L (ref 23–29)
CREAT SERPL-MCNC: 1 MG/DL (ref 0.5–1.4)
DIFFERENTIAL METHOD: ABNORMAL
EOSINOPHIL # BLD AUTO: 0 K/UL (ref 0–0.5)
EOSINOPHIL NFR BLD: 0.1 % (ref 0–8)
ERYTHROCYTE [DISTWIDTH] IN BLOOD BY AUTOMATED COUNT: 19.7 % (ref 11.5–14.5)
EST. GFR  (AFRICAN AMERICAN): >60 ML/MIN/1.73 M^2
EST. GFR  (NON AFRICAN AMERICAN): >60 ML/MIN/1.73 M^2
GLUCOSE SERPL-MCNC: 121 MG/DL (ref 70–110)
HCT VFR BLD AUTO: 34.2 % (ref 40–54)
HGB BLD-MCNC: 9.6 G/DL (ref 14–18)
IMM GRANULOCYTES # BLD AUTO: 0.05 K/UL (ref 0–0.04)
IMM GRANULOCYTES NFR BLD AUTO: 0.4 % (ref 0–0.5)
LYMPHOCYTES # BLD AUTO: 0.7 K/UL (ref 1–4.8)
LYMPHOCYTES NFR BLD: 5.7 % (ref 18–48)
MAGNESIUM SERPL-MCNC: 1.9 MG/DL (ref 1.6–2.6)
MCH RBC QN AUTO: 22.9 PG (ref 27–31)
MCHC RBC AUTO-ENTMCNC: 28.1 G/DL (ref 32–36)
MCV RBC AUTO: 81 FL (ref 82–98)
MONOCYTES # BLD AUTO: 0.7 K/UL (ref 0.3–1)
MONOCYTES NFR BLD: 6 % (ref 4–15)
NEUTROPHILS # BLD AUTO: 10.2 K/UL (ref 1.8–7.7)
NEUTROPHILS NFR BLD: 87.7 % (ref 38–73)
NRBC BLD-RTO: 0 /100 WBC
PLATELET # BLD AUTO: 386 K/UL (ref 150–450)
PMV BLD AUTO: 10.3 FL (ref 9.2–12.9)
POTASSIUM SERPL-SCNC: 4.4 MMOL/L (ref 3.5–5.1)
PROT SERPL-MCNC: 6.6 G/DL (ref 6–8.4)
RBC # BLD AUTO: 4.2 M/UL (ref 4.6–6.2)
SODIUM SERPL-SCNC: 139 MMOL/L (ref 136–145)
WBC # BLD AUTO: 11.6 K/UL (ref 3.9–12.7)

## 2022-07-13 PROCEDURE — 85025 COMPLETE CBC W/AUTO DIFF WBC: CPT | Performed by: STUDENT IN AN ORGANIZED HEALTH CARE EDUCATION/TRAINING PROGRAM

## 2022-07-13 PROCEDURE — 97161 PT EVAL LOW COMPLEX 20 MIN: CPT

## 2022-07-13 PROCEDURE — 80053 COMPREHEN METABOLIC PANEL: CPT | Performed by: STUDENT IN AN ORGANIZED HEALTH CARE EDUCATION/TRAINING PROGRAM

## 2022-07-13 PROCEDURE — 97165 OT EVAL LOW COMPLEX 30 MIN: CPT

## 2022-07-13 PROCEDURE — 83735 ASSAY OF MAGNESIUM: CPT | Performed by: SURGERY

## 2022-07-13 PROCEDURE — 63600175 PHARM REV CODE 636 W HCPCS: Performed by: STUDENT IN AN ORGANIZED HEALTH CARE EDUCATION/TRAINING PROGRAM

## 2022-07-13 PROCEDURE — 36415 COLL VENOUS BLD VENIPUNCTURE: CPT | Performed by: SURGERY

## 2022-07-13 PROCEDURE — 27000221 HC OXYGEN, UP TO 24 HOURS

## 2022-07-13 PROCEDURE — 11000001 HC ACUTE MED/SURG PRIVATE ROOM

## 2022-07-13 PROCEDURE — 25000003 PHARM REV CODE 250: Performed by: STUDENT IN AN ORGANIZED HEALTH CARE EDUCATION/TRAINING PROGRAM

## 2022-07-13 PROCEDURE — 94799 UNLISTED PULMONARY SVC/PX: CPT

## 2022-07-13 PROCEDURE — 97110 THERAPEUTIC EXERCISES: CPT

## 2022-07-13 PROCEDURE — 25000003 PHARM REV CODE 250: Performed by: SURGERY

## 2022-07-13 PROCEDURE — 97530 THERAPEUTIC ACTIVITIES: CPT

## 2022-07-13 PROCEDURE — 99900035 HC TECH TIME PER 15 MIN (STAT)

## 2022-07-13 RX ORDER — PANTOPRAZOLE SODIUM 40 MG/1
40 FOR SUSPENSION ORAL DAILY
Status: DISCONTINUED | OUTPATIENT
Start: 2022-07-13 | End: 2022-07-14

## 2022-07-13 RX ORDER — ACETAMINOPHEN 650 MG/20.3ML
1000 LIQUID ORAL EVERY 8 HOURS
Status: DISCONTINUED | OUTPATIENT
Start: 2022-07-13 | End: 2022-07-14

## 2022-07-13 RX ADMIN — SODIUM CHLORIDE, SODIUM LACTATE, POTASSIUM CHLORIDE, AND CALCIUM CHLORIDE: .6; .31; .03; .02 INJECTION, SOLUTION INTRAVENOUS at 09:07

## 2022-07-13 RX ADMIN — SODIUM CHLORIDE, SODIUM LACTATE, POTASSIUM CHLORIDE, AND CALCIUM CHLORIDE: .6; .31; .03; .02 INJECTION, SOLUTION INTRAVENOUS at 05:07

## 2022-07-13 RX ADMIN — ONDANSETRON 4 MG: 2 INJECTION INTRAMUSCULAR; INTRAVENOUS at 07:07

## 2022-07-13 RX ADMIN — ALVIMOPAN 12 MG: 12 CAPSULE ORAL at 10:07

## 2022-07-13 RX ADMIN — LIDOCAINE 1 PATCH: 50 PATCH TOPICAL at 05:07

## 2022-07-13 RX ADMIN — FAMOTIDINE 20 MG: 10 INJECTION, SOLUTION INTRAVENOUS at 08:07

## 2022-07-13 RX ADMIN — MUPIROCIN 1 G: 20 OINTMENT TOPICAL at 08:07

## 2022-07-13 RX ADMIN — ALVIMOPAN 12 MG: 12 CAPSULE ORAL at 09:07

## 2022-07-13 RX ADMIN — PROCHLORPERAZINE EDISYLATE 5 MG: 5 INJECTION INTRAMUSCULAR; INTRAVENOUS at 10:07

## 2022-07-13 RX ADMIN — ENOXAPARIN SODIUM 40 MG: 100 INJECTION SUBCUTANEOUS at 05:07

## 2022-07-13 RX ADMIN — MUPIROCIN 1 G: 20 OINTMENT TOPICAL at 10:07

## 2022-07-13 RX ADMIN — ACETAMINOPHEN 999.01 MG: 160 SOLUTION ORAL at 10:07

## 2022-07-13 RX ADMIN — PANTOPRAZOLE SODIUM 40 MG: 40 GRANULE, DELAYED RELEASE ORAL at 03:07

## 2022-07-13 RX ADMIN — ACETAMINOPHEN 999.01 MG: 160 SOLUTION ORAL at 03:07

## 2022-07-13 NOTE — SUBJECTIVE & OBJECTIVE
Interval History: AF, HDS. No bowel function yet. NGT with 70 mL gastric secretions. Pain moderately well controlled. Worked with PT/OT this morning    Medications:  Continuous Infusions:   hydromorphone in 0.9 % NaCl 6 mg/30 ml      lactated ringers 125 mL/hr at 07/13/22 0911     Scheduled Meds:   alvimopan  12 mg Oral BID    enoxaparin  40 mg Subcutaneous Daily    famotidine (PF)  20 mg Intravenous BID    LIDOcaine  1 patch Transdermal Q24H    mupirocin  1 g Nasal BID     PRN Meds:naloxone, ondansetron, prochlorperazine     Review of patient's allergies indicates:   Allergen Reactions    Ativan [lorazepam] Anxiety     Patient had paradoxical effect after ativan IV and was so agitated he had to be restrained and transferred to ICU for precedex.     Objective:     Vital Signs (Most Recent):  Temp: 98 °F (36.7 °C) (07/13/22 1044)  Pulse: 88 (07/13/22 1044)  Resp: 16 (07/13/22 1044)  BP: (!) 141/83 (07/13/22 1044)  SpO2: 96 % (07/13/22 1044)   Vital Signs (24h Range):  Temp:  [97.3 °F (36.3 °C)-98.6 °F (37 °C)] 98 °F (36.7 °C)  Pulse:  [] 88  Resp:  [12-27] 16  SpO2:  [94 %-100 %] 96 %  BP: (126-169)/() 141/83     Weight: 78.3 kg (172 lb 9 oz)  Body mass index is 24.76 kg/m².    Intake/Output - Last 3 Shifts         07/11 0700  07/12 0659 07/12 0700 07/13 0659 07/13 0700  07/14 0659    P.O.   0    I.V. (mL/kg)  1000 (12.8)     Total Intake(mL/kg)  1000 (12.8) 0 (0)    Urine (mL/kg/hr)  100     Drains  70     Blood  50     Total Output  220     Net  +780 0                   Physical Exam  Vitals and nursing note reviewed.   Constitutional:       General: He is not in acute distress.     Appearance: He is well-developed. He is not diaphoretic.   Cardiovascular:      Rate and Rhythm: Normal rate and regular rhythm.   Pulmonary:      Effort: Pulmonary effort is normal. No respiratory distress.   Abdominal:      Comments: Incisions with staples in place c/d/I. Dressing changes this morning  Soft,  non-distended, appropriately tender   Musculoskeletal:         General: Normal range of motion.   Skin:     General: Skin is warm and dry.   Neurological:      General: No focal deficit present.      Mental Status: He is alert.      Cranial Nerves: No cranial nerve deficit.   Psychiatric:         Mood and Affect: Mood normal.         Behavior: Behavior normal.       Significant Labs:  I have reviewed all pertinent lab results within the past 24 hours.  CBC:   Recent Labs   Lab 07/13/22  0515   WBC 11.60   RBC 4.20*   HGB 9.6*   HCT 34.2*      MCV 81*   MCH 22.9*   MCHC 28.1*     CMP:   Recent Labs   Lab 07/13/22  0515   *   CALCIUM 8.6*   ALBUMIN 3.0*   PROT 6.6      K 4.4   CO2 21*      BUN 15   CREATININE 1.0   ALKPHOS 97   ALT 12   AST 23   BILITOT 1.4*       Significant Diagnostics:  I have reviewed all pertinent imaging results/findings within the past 24 hours.

## 2022-07-13 NOTE — PLAN OF CARE
Chedexter had no acute changes overnight. NG intermittent suction, pt on O2 for comfort. PCA pump is being used to control pain. Abd is dressed with island dressing and binder.     Problem: Adult Inpatient Plan of Care  Goal: Optimal Comfort and Wellbeing  Outcome: Ongoing, Progressing     Problem: Infection  Goal: Absence of Infection Signs and Symptoms  Outcome: Ongoing, Progressing     Problem: Fall Injury Risk  Goal: Absence of Fall and Fall-Related Injury  Outcome: Ongoing, Progressing

## 2022-07-13 NOTE — PROGRESS NOTES
[facility] Surg  General Surgery  Progress Note    Subjective:     History of Present Illness:  No notes on file    Post-Op Info:  Procedure(s) (LRB):  GASTRECTOMY (N/A)   1 Day Post-Op     Interval History: AF, HDS. No bowel function yet. NGT with 70 mL gastric secretions. Pain moderately well controlled. Worked with PT/OT this morning    Medications:  Continuous Infusions:   hydromorphone in 0.9 % NaCl 6 mg/30 ml      lactated ringers 125 mL/hr at 07/13/22 0911     Scheduled Meds:   alvimopan  12 mg Oral BID    enoxaparin  40 mg Subcutaneous Daily    famotidine (PF)  20 mg Intravenous BID    LIDOcaine  1 patch Transdermal Q24H    mupirocin  1 g Nasal BID     PRN Meds:naloxone, ondansetron, prochlorperazine     Review of patient's allergies indicates:   Allergen Reactions    Ativan [lorazepam] Anxiety     Patient had paradoxical effect after ativan IV and was so agitated he had to be restrained and transferred to ICU for precedex.     Objective:     Vital Signs (Most Recent):  Temp: 98 °F (36.7 °C) (07/13/22 1044)  Pulse: 88 (07/13/22 1044)  Resp: 16 (07/13/22 1044)  BP: (!) 141/83 (07/13/22 1044)  SpO2: 96 % (07/13/22 1044)   Vital Signs (24h Range):  Temp:  [97.3 °F (36.3 °C)-98.6 °F (37 °C)] 98 °F (36.7 °C)  Pulse:  [] 88  Resp:  [12-27] 16  SpO2:  [94 %-100 %] 96 %  BP: (126-169)/() 141/83     Weight: 78.3 kg (172 lb 9 oz)  Body mass index is 24.76 kg/m².    Intake/Output - Last 3 Shifts         07/11 0700 07/12 0659 07/12 0700 07/13 0659 07/13 0700 07/14 0659    P.O.   0    I.V. (mL/kg)  1000 (12.8)     Total Intake(mL/kg)  1000 (12.8) 0 (0)    Urine (mL/kg/hr)  100     Drains  70     Blood  50     Total Output  220     Net  +780 0                   Physical Exam  Vitals and nursing note reviewed.   Constitutional:       General: He is not in acute distress.     Appearance: He is well-developed. He is not diaphoretic.   Cardiovascular:      Rate and Rhythm: Normal rate and regular  rhythm.   Pulmonary:      Effort: Pulmonary effort is normal. No respiratory distress.   Abdominal:      Comments: Incisions with staples in place c/d/I. Dressing changes this morning  Soft, non-distended, appropriately tender   Musculoskeletal:         General: Normal range of motion.   Skin:     General: Skin is warm and dry.   Neurological:      General: No focal deficit present.      Mental Status: He is alert.      Cranial Nerves: No cranial nerve deficit.   Psychiatric:         Mood and Affect: Mood normal.         Behavior: Behavior normal.       Significant Labs:  I have reviewed all pertinent lab results within the past 24 hours.  CBC:   Recent Labs   Lab 07/13/22  0515   WBC 11.60   RBC 4.20*   HGB 9.6*   HCT 34.2*      MCV 81*   MCH 22.9*   MCHC 28.1*     CMP:   Recent Labs   Lab 07/13/22 0515   *   CALCIUM 8.6*   ALBUMIN 3.0*   PROT 6.6      K 4.4   CO2 21*      BUN 15   CREATININE 1.0   ALKPHOS 97   ALT 12   AST 23   BILITOT 1.4*       Significant Diagnostics:  I have reviewed all pertinent imaging results/findings within the past 24 hours.    Assessment/Plan:     * Malignant neoplasm of body of stomach  Rayo Duran Sr. is a 86 y.o. 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 Will adjust these today  - NGT to LIWS  - NPO  - mIVF  - GI ppx  - PT/OT  - DVT ppx        Dc Duarte MD  General Surgery  [facility] - Med Surg

## 2022-07-13 NOTE — PT/OT/SLP EVAL
"Occupational Therapy   Evaluation    Name: Rayo Duran Sr.  MRN: [id number]  Admitting Diagnosis:  Malignant neoplasm of body of stomach  Recent Surgery: Procedure(s) (LRB):  GASTRECTOMY (N/A) 1 Day Post-Op    Recommendations:     Discharge Recommendations: home health OT (with family assistance)  Discharge Equipment Recommendations:  none  Barriers to discharge:   (pt completed session on 2L O2)    Assessment:     Rayo Duran Sr. is a 86 y.o. male with a medical diagnosis of Malignant neoplasm of body of stomach. Performance deficits affecting function: weakness, impaired endurance, impaired skin, pain, impaired balance, impaired functional mobilty, gait instability, decreased safety awareness, decreased lower extremity function, decreased upper extremity function, impaired self care skills, decreased coordination, impaired cognition.      Rehab Prognosis: Good; patient would benefit from acute skilled OT services to address these deficits and reach maximum level of function.       Plan:     Patient to be seen  (5-6x/week) to address the above listed problems via self-care/home management, therapeutic activities, therapeutic exercises  · Plan of Care Expires: 07/27/22  · Plan of Care Reviewed with: patient    Subjective     Chief Complaint: pain   Patient/Family Comments/goals: felt a little better with sitting EOB; confused about how to PCA pump works (wife attempting to explain along with staff)     Occupational Profile:  Living Environment: pt lives with his wife in a 2SH with 0 NIVIA. Pt's bed/bath on the first floor. Chair lift to the second floor.   Previous level of function: MOD I with ADLs and mobility without use of AD.   Equipment Used at Home:  grab bar, bath bench, walker, rolling, rollator, bedside commode (chair lift)  Assistance upon Discharge: wife, son     Pain/Comfort:  · Pain Rating 1:  (c/o abdominal pain rated "medium")  · Pain Addressed 1: Pre-medicate for activity, Reposition, Distraction, " Nurse notified, Cessation of Activity    Patients cultural, spiritual, Anabaptist conflicts given the current situation: no    Objective:     Communicated with: nursing prior to session.  Patient found HOB elevated with bed alarm, SCD, peripheral IV, PCA, oxygen (abdominal binder) upon OT entry to room.    General Precautions: Standard, fall (abdominal precautions)   Orthopedic Precautions:N/A   Braces: N/A  Respiratory Status: Nasal cannula, flow 2 L/min    Occupational Performance:    Bed Mobility:    · Patient completed Log Rolling to Right with minimum assistance  · Patient completed Scooting anteriorly with contact guard assistance  · Patient completed Supine to Sit with moderate assistance  · Patient completed Sit to Supine with minimum assistance    Functional Mobility/Transfers:  · Patient completed Sit <> Stand Transfer with minimum assistance  with  hand-held assist   · Functional Mobility: pt took 2 steps froward then backwards then lateral steps to the R with HHA and MIN A.     Activities of Daily Living:  · Upper Body Dressing: minimum assistance to don back gown d/t multiple lines while seated unsupported at EOB  · Lower Body Dressing: total assistance provided to don socks 2* pain    Cognitive/Visual Perceptual:  Cognitive/Psychosocial Skills:     -       Oriented to: name, , location, year    -       Follows Commands/attention:follows most simple commands  -       Communication: clear/fluent  -       Memory: Poor immediate recall  -       Safety awareness/insight to disability: impaired   -       Mood/Affect/Coping skills/emotional control: Cooperative  Visual/Perceptual:      -Intact  R/L discrimination      Physical Exam:  Balance:    -       seated: SBA; standing: SBA-CGA  Postural examination/scapula alignment:    -       Rounded shoulders  -       Forward head  Skin integrity: abdominal binder in place  Edema:  no BUE edema noted  Sensation:    -       Intact  light/touch BUE  Upper Extremity  Range of Motion:     -       Right Upper Extremity: WFL  -       Left Upper Extremity: WFL  Upper Extremity Strength:    -       Right Upper Extremity: WFL  -       Left Upper Extremity: WFL   Strength:    -       Right Upper Extremity: WFL  -       Left Upper Extremity: WFL  Fine Motor Coordination:    -       Intact  Left hand, manipulation of objects and Right hand, manipulation of objects  Gross motor coordination:   Mildly impaired 2* confusion and pain     AMPAC 6 Click ADL:  [facility] Total Score: 18    Treatment & Education:  · Pt educated on OT role/POC.   · Importance of OOB activity with staff assistance. edu pt and wife on pt can get OOB to the BSC with nursing staff- PCT notified   · Safety during functional t/f and mobility with log roll technique   · Frequent cueing with demo of pursed lip breathing   · Edu with demo and cueing for log roll technique   · Multiple self-care tasks/functional mobility completed- assistance level noted above   · All questions/concerns answered within OT scope of practice     Education:    Patient left HOB elevated with B/L heels offloaded and SCDs in place with all lines intact, call button in reach, bed alarm on, PCT, Marian, notified, wife present and MD at bedside; all needs met/within reach     GOALS:   Multidisciplinary Problems     Occupational Therapy Goals        Problem: Occupational Therapy    Goal Priority Disciplines Outcome Interventions   Occupational Therapy Goal     OT, PT/OT Ongoing, Progressing    Description: Goals to be met by: 07/27/22     Patient will increase functional independence with ADLs by performing:    UE Dressing with Modified [place].  LE Dressing with Minimal Assistance.  Grooming while standing at sink with Supervision.  Toileting from bedside commode with Supervision for hygiene and clothing management.   Supine to sit with Supervision.  Step transfer with Supervision  Toilet transfer to bedside commode with Supervision.  Upper  extremity exercise program x15 reps per handout, with independence.                     History:     Past Medical History:   Diagnosis Date    Cancer     gastric    Dizziness     Hypertension     Insomnia     Stomach ulcer     Tumor     removed from stomach       Past Surgical History:   Procedure Laterality Date    APPENDECTOMY      CHOLECYSTECTOMY      ESOPHAGOGASTRODUODENOSCOPY N/A 5/31/2022    Procedure: EGD (ESOPHAGOGASTRODUODENOSCOPY);  Surgeon: Mario Escalera MD;  Location: [facility];  Service: Endoscopy;  Laterality: N/A;    GASTRECTOMY      PARTIAL GASTRECTOMY      SHOULDER SURGERY Right        Time Tracking:     OT Date of Treatment: 07/13/22  OT Start Time: 0932  OT Stop Time: 0958  OT Total Time (min): 26 min    Billable Minutes:Evaluation 15 min  Therapeutic Activity 8 min  Total Time 26 min (co-eval with PT)     7/13/2022

## 2022-07-13 NOTE — PT/OT/SLP EVAL
"Physical Therapy Evaluation    Patient Name:  Rayo Duran Sr.   MRN:  [id number]    Recommendations:     Discharge Recommendations:  home with home health, home health PT (Family support)   Discharge Equipment Recommendations: none   Barriers to discharge: Pt not functioning at baseline and pain not controlled     Assessment:     Rayo Duran Sr. is a 86 y.o. male admitted with a medical diagnosis of Malignant neoplasm of body of stomach.  He presents with the following impairments/functional limitations:  weakness, impaired balance, decreased safety awareness, impaired skin, impaired endurance, pain, impaired cognition, impaired self care skills, decreased coordination, impaired functional mobilty, impaired coordination, gait instability .    Rehab Prognosis: Good; patient would benefit from acute skilled PT services to address these deficits and reach maximum level of function.    Recent Surgery: Procedure(s) (LRB):  GASTRECTOMY (N/A) 1 Day Post-Op    Plan:     During this hospitalization, patient to be seen  (3-5x/wk) to address the identified rehab impairments via gait training, therapeutic activities, therapeutic exercises and progress toward the following goals:    · Plan of Care Expires:  07/27/22    Subjective     Chief Complaint: pain, SOB  Patient/Family Comments/goals: Pt agreeable to evaluation.  Confused regarding PCA pump  Pain/Comfort:  Pain Rating 1:  ("medium")  Location 1: abdomen  Pain Addressed 1: Pre-medicate for activity, Reposition, Distraction, Cessation of Activity, Nurse notified (PCA in place)    Patients cultural, spiritual, Evangelical conflicts given the current situation: no    Living Environment:  Pt lives with his spouse in a 2-qiana house with a chair lift, but pt can live on the first floor   Prior to admission, patients level of function was independent.  Equipment used at home: none.  DME owned (not currently used): rolling walker, single point cane, bedside commode and shower " chair.  Upon discharge, patient will have assistance from Spouse and son.    Objective:     Communicated with nsg prior to session.  Patient found HOB elevated with NG tube, bed alarm, SCD, PCA, oxygen, peripheral IV  upon PT entry to room.    General Precautions: Standard, fall (Abdominal Precautions)   Orthopedic Precautions:N/A   Braces: N/A  Respiratory Status: Nasal cannula, flow 2 L/min    Exams:  · Cognitive Exam:  Patient is oriented to Person, Place and Time  · Gross Motor Coordination:  mildly impaired 2/2 pain, gen weakness, and deocnditioining  · Postural Exam:  Patient presented with the following abnormalities:    · -       Rounded shoulders  · -       Forward head  · Sensation:    · -       Intact  light/touch B LE's  · Skin Integrity/Edema:      · -       Skin integrity: Visible skin intact  · RLE ROM: WFL  · RLE Strength: WFL  · LLE ROM: WFL  · LLE Strength: WFL    Functional Mobility:  · Bed Mobility:     · Rolling Right: minimum assistance and with Vc/TC for logrolling   · Scooting: contact guard assistance and with Vc/Tc for hand placement  · Bridging: contact guard assistance  · Supine to Sit: moderate assistance and with VC/TC for body mechanics  · Sit to Supine: minimum assistance and with Vc/TC for body mechanics  · Transfers:     · Sit to Stand:  minimum assistance with hand-held assist  · Gait: Pt ambulated 2 steps forward/backward/Right with HHA/Min A  · Balance: Fair+ sit, Fair stand    Therapeutic Activities and Exercises:  Educated pt/CG on purpose/use of PCA.  CG verbalized understanding, but pt needs reinforcement 2/2 short term memory deficit.  Educated pt on Pursed lip breathing 2/2 SOB, needs Reinforcement.  SPO2 on 2L O2 98%.  Educated pt/CG on Abdominal precautions/bed mobility training as above.  Pt marched in place x 10 reps with HHA/Min A.  Educated pt on Performing B AP's, TKE's, and GS while in the bed throughout the day.  Pt verbalized/demonstrated understanding with VC/TC,  but will need reinforcement 2/2 short term memory deficits    AM-PAC 6 CLICK MOBILITY  Total Score:16     Patient left HOB elevated with all lines intact, call button in reach, bed alarm on, nsg notified and spouse present.    GOALS:   Multidisciplinary Problems     Physical Therapy Goals        Problem: Physical Therapy    Goal Priority Disciplines Outcome Goal Variances Interventions   Physical Therapy Goal     PT, PT/OT Ongoing, Progressing     Description: Goals to be met by: 22     Patient will increase functional independence with mobility by performin. Supine to sit with Supervision  2. Rolling to Left and Right with Supervision  3. Sit to stand transfer with Supervision  4. Bed to chair transfer with Supervision   5. Gait  x 100 feet with Supervision with or without AD   6. Lower extremity exercise program x10 reps per handout, with supervision                     History:     Past Medical History:   Diagnosis Date    Cancer     gastric    Dizziness     Hypertension     Insomnia     Stomach ulcer     Tumor     removed from stomach       Past Surgical History:   Procedure Laterality Date    APPENDECTOMY      CHOLECYSTECTOMY      ESOPHAGOGASTRODUODENOSCOPY N/A 2022    Procedure: EGD (ESOPHAGOGASTRODUODENOSCOPY);  Surgeon: Mario Escalera MD;  Location: [facility];  Service: Endoscopy;  Laterality: N/A;    GASTRECTOMY      PARTIAL GASTRECTOMY      SHOULDER SURGERY Right        Time Tracking:     PT Received On: 22  PT Start Time: 932     PT Stop Time: 958  PT Total Time (min): 26 min     Billable Minutes: Evaluation 15 TE 8 Co-evaluation with OT      2022

## 2022-07-13 NOTE — PROGRESS NOTES
07/12/22 6370   Goal: Minimized Risk/Safety Maintenance   Elevated Risk Identified none   Problem Identified none   Outcome Minimized Risk and Safety met   Goal: Physiologic Homeostasis   Elevated Risk Identified bleeding   Problem Identified none   Bleeding Management dressing monitored   Outcome Physiologic Homeostasis met   Interventions   VTE Required Core Measure (SCDs) Sequential compression device initiated/maintained   VTE Prevention/Management intravenous hydration   Safety Promotion/Fall Prevention fall prevention program maintained;safety round/check completed   Trust Relationship/Rapport care explained;questions answered;questions encouraged   Goal: Optimal Comfort and Wellbeing   Elevated Risk Identified pain   Problem Identified pain   Outcome Optimal Comfort and Wellbeing met   Goal: Anesthesia/Sedation Recovery   Outcome Anesthesia/Sedation Recovery criteria met for transfer  (See flowsheet for full assessment.)   Outcome Summary   Plan of Care Reviewed With patient

## 2022-07-13 NOTE — PLAN OF CARE
Problem: Occupational Therapy  Goal: Occupational Therapy Goal  Description: Goals to be met by: 07/27/22     Patient will increase functional independence with ADLs by performing:    UE Dressing with Modified [place].  LE Dressing with Minimal Assistance.  Grooming while standing at sink with Supervision.  Toileting from bedside commode with Supervision for hygiene and clothing management.   Supine to sit with Supervision.  Step transfer with Supervision  Toilet transfer to bedside commode with Supervision.  Upper extremity exercise program x15 reps per handout, with independence.    Outcome: Ongoing, Progressing     OT rec HHOT with family assistance at d/c in order to increase safety and independence with ADLs and all aspects of functional mobility.

## 2022-07-13 NOTE — PLAN OF CARE
Problem: Physical Therapy  Goal: Physical Therapy Goal  Description: Goals to be met by: 22     Patient will increase functional independence with mobility by performin. Supine to sit with Supervision  2. Rolling to Left and Right with Supervision  3. Sit to stand transfer with Supervision  4. Bed to chair transfer with Supervision   5. Gait  x 100 feet with Supervision with or without AD   6. Lower extremity exercise program x10 reps per handout, with supervision    Outcome: Ongoing, Progressing   Initial PT evaluation performed today.  Pt could benefit from skilled PT services 3-5x/wk in order to maximize function prior to D/C.  Home with family and HHPT recommended at time of D/C.

## 2022-07-13 NOTE — ASSESSMENT & PLAN NOTE
Rayo BILLS Roger Winn is a 86 y.o. 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 Will adjust these today  - NGT to LIWS  - NPO  - mIVF  - GI ppx  - PT/OT  - DVT ppx

## 2022-07-14 LAB
ABO + RH BLD: NORMAL
ABO + RH BLD: NORMAL
ALBUMIN SERPL BCP-MCNC: 2.7 G/DL (ref 3.5–5.2)
ALP SERPL-CCNC: 79 U/L (ref 55–135)
ALT SERPL W/O P-5'-P-CCNC: 10 U/L (ref 10–44)
ANION GAP SERPL CALC-SCNC: 9 MMOL/L (ref 8–16)
AST SERPL-CCNC: 21 U/L (ref 10–40)
BASOPHILS # BLD AUTO: 0.05 K/UL (ref 0–0.2)
BASOPHILS NFR BLD: 0.5 % (ref 0–1.9)
BILIRUB SERPL-MCNC: 1.3 MG/DL (ref 0.1–1)
BLD GP AB SCN CELLS X3 SERPL QL: NORMAL
BLD GP AB SCN CELLS X3 SERPL QL: NORMAL
BLD PROD TYP BPU: NORMAL
BLOOD UNIT EXPIRATION DATE: NORMAL
BLOOD UNIT TYPE CODE: 6200
BLOOD UNIT TYPE: NORMAL
BUN SERPL-MCNC: 16 MG/DL (ref 8–23)
CALCIUM SERPL-MCNC: 8.3 MG/DL (ref 8.7–10.5)
CHLORIDE SERPL-SCNC: 109 MMOL/L (ref 95–110)
CO2 SERPL-SCNC: 21 MMOL/L (ref 23–29)
CODING SYSTEM: NORMAL
CREAT SERPL-MCNC: 0.9 MG/DL (ref 0.5–1.4)
DIFFERENTIAL METHOD: ABNORMAL
DISPENSE STATUS: NORMAL
EOSINOPHIL # BLD AUTO: 0.1 K/UL (ref 0–0.5)
EOSINOPHIL NFR BLD: 0.6 % (ref 0–8)
ERYTHROCYTE [DISTWIDTH] IN BLOOD BY AUTOMATED COUNT: 19.8 % (ref 11.5–14.5)
EST. GFR  (AFRICAN AMERICAN): >60 ML/MIN/1.73 M^2
EST. GFR  (NON AFRICAN AMERICAN): >60 ML/MIN/1.73 M^2
GLUCOSE SERPL-MCNC: 99 MG/DL (ref 70–110)
HCT VFR BLD AUTO: 26.8 % (ref 40–54)
HGB BLD-MCNC: 7.6 G/DL (ref 14–18)
IMM GRANULOCYTES # BLD AUTO: 0.07 K/UL (ref 0–0.04)
IMM GRANULOCYTES NFR BLD AUTO: 0.7 % (ref 0–0.5)
LYMPHOCYTES # BLD AUTO: 0.8 K/UL (ref 1–4.8)
LYMPHOCYTES NFR BLD: 7.4 % (ref 18–48)
MCH RBC QN AUTO: 22.8 PG (ref 27–31)
MCHC RBC AUTO-ENTMCNC: 28.4 G/DL (ref 32–36)
MCV RBC AUTO: 80 FL (ref 82–98)
MONOCYTES # BLD AUTO: 0.6 K/UL (ref 0.3–1)
MONOCYTES NFR BLD: 5.9 % (ref 4–15)
NEUTROPHILS # BLD AUTO: 8.9 K/UL (ref 1.8–7.7)
NEUTROPHILS NFR BLD: 84.9 % (ref 38–73)
NRBC BLD-RTO: 0 /100 WBC
PLATELET # BLD AUTO: 310 K/UL (ref 150–450)
PMV BLD AUTO: 9.7 FL (ref 9.2–12.9)
POTASSIUM SERPL-SCNC: 3.9 MMOL/L (ref 3.5–5.1)
PROT SERPL-MCNC: 5.9 G/DL (ref 6–8.4)
RBC # BLD AUTO: 3.34 M/UL (ref 4.6–6.2)
SODIUM SERPL-SCNC: 139 MMOL/L (ref 136–145)
TRANS ERYTHROCYTES VOL PATIENT: NORMAL ML
WBC # BLD AUTO: 10.46 K/UL (ref 3.9–12.7)

## 2022-07-14 PROCEDURE — 97535 SELF CARE MNGMENT TRAINING: CPT

## 2022-07-14 PROCEDURE — 25000003 PHARM REV CODE 250: Performed by: STUDENT IN AN ORGANIZED HEALTH CARE EDUCATION/TRAINING PROGRAM

## 2022-07-14 PROCEDURE — 63600175 PHARM REV CODE 636 W HCPCS: Performed by: STUDENT IN AN ORGANIZED HEALTH CARE EDUCATION/TRAINING PROGRAM

## 2022-07-14 PROCEDURE — 80053 COMPREHEN METABOLIC PANEL: CPT | Performed by: STUDENT IN AN ORGANIZED HEALTH CARE EDUCATION/TRAINING PROGRAM

## 2022-07-14 PROCEDURE — P9021 RED BLOOD CELLS UNIT: HCPCS | Performed by: STUDENT IN AN ORGANIZED HEALTH CARE EDUCATION/TRAINING PROGRAM

## 2022-07-14 PROCEDURE — 36415 COLL VENOUS BLD VENIPUNCTURE: CPT | Performed by: STUDENT IN AN ORGANIZED HEALTH CARE EDUCATION/TRAINING PROGRAM

## 2022-07-14 PROCEDURE — 25000003 PHARM REV CODE 250: Performed by: SURGERY

## 2022-07-14 PROCEDURE — 97110 THERAPEUTIC EXERCISES: CPT

## 2022-07-14 PROCEDURE — 85025 COMPLETE CBC W/AUTO DIFF WBC: CPT | Performed by: STUDENT IN AN ORGANIZED HEALTH CARE EDUCATION/TRAINING PROGRAM

## 2022-07-14 PROCEDURE — 11000001 HC ACUTE MED/SURG PRIVATE ROOM

## 2022-07-14 RX ORDER — ACETAMINOPHEN 500 MG
1000 TABLET ORAL EVERY 8 HOURS
Status: DISCONTINUED | OUTPATIENT
Start: 2022-07-14 | End: 2022-07-16 | Stop reason: HOSPADM

## 2022-07-14 RX ORDER — ZIPRASIDONE HYDROCHLORIDE 20 MG/1
20 CAPSULE ORAL 2 TIMES DAILY PRN
Status: DISCONTINUED | OUTPATIENT
Start: 2022-07-14 | End: 2022-07-16 | Stop reason: HOSPADM

## 2022-07-14 RX ORDER — QUETIAPINE FUMARATE 25 MG/1
25 TABLET, FILM COATED ORAL
Status: DISCONTINUED | OUTPATIENT
Start: 2022-07-14 | End: 2022-07-14

## 2022-07-14 RX ORDER — QUETIAPINE FUMARATE 25 MG/1
25 TABLET, FILM COATED ORAL NIGHTLY
Status: DISCONTINUED | OUTPATIENT
Start: 2022-07-14 | End: 2022-07-16 | Stop reason: HOSPADM

## 2022-07-14 RX ORDER — HYDROCODONE BITARTRATE AND ACETAMINOPHEN 500; 5 MG/1; MG/1
TABLET ORAL
Status: DISCONTINUED | OUTPATIENT
Start: 2022-07-14 | End: 2022-07-16 | Stop reason: HOSPADM

## 2022-07-14 RX ORDER — OXYCODONE HYDROCHLORIDE 5 MG/1
5 TABLET ORAL EVERY 6 HOURS PRN
Status: DISCONTINUED | OUTPATIENT
Start: 2022-07-14 | End: 2022-07-16 | Stop reason: HOSPADM

## 2022-07-14 RX ORDER — TALC
6 POWDER (GRAM) TOPICAL NIGHTLY
Status: DISCONTINUED | OUTPATIENT
Start: 2022-07-14 | End: 2022-07-16 | Stop reason: HOSPADM

## 2022-07-14 RX ORDER — PANTOPRAZOLE SODIUM 40 MG/1
40 TABLET, DELAYED RELEASE ORAL DAILY
Status: DISCONTINUED | OUTPATIENT
Start: 2022-07-14 | End: 2022-07-16 | Stop reason: HOSPADM

## 2022-07-14 RX ORDER — ZIPRASIDONE HYDROCHLORIDE 20 MG/1
20 CAPSULE ORAL 2 TIMES DAILY
Status: DISCONTINUED | OUTPATIENT
Start: 2022-07-14 | End: 2022-07-14

## 2022-07-14 RX ORDER — GABAPENTIN 100 MG/1
100 CAPSULE ORAL 3 TIMES DAILY
Status: DISCONTINUED | OUTPATIENT
Start: 2022-07-14 | End: 2022-07-15

## 2022-07-14 RX ADMIN — GABAPENTIN 100 MG: 100 CAPSULE ORAL at 03:07

## 2022-07-14 RX ADMIN — ZIPRASIDONE HYDROCHLORIDE 20 MG: 20 CAPSULE ORAL at 10:07

## 2022-07-14 RX ADMIN — MUPIROCIN 1 G: 20 OINTMENT TOPICAL at 09:07

## 2022-07-14 RX ADMIN — SODIUM CHLORIDE, SODIUM LACTATE, POTASSIUM CHLORIDE, AND CALCIUM CHLORIDE: .6; .31; .03; .02 INJECTION, SOLUTION INTRAVENOUS at 10:07

## 2022-07-14 RX ADMIN — GABAPENTIN 100 MG: 100 CAPSULE ORAL at 09:07

## 2022-07-14 RX ADMIN — LIDOCAINE 1 PATCH: 50 PATCH TOPICAL at 04:07

## 2022-07-14 RX ADMIN — ACETAMINOPHEN 1000 MG: 500 TABLET, FILM COATED ORAL at 01:07

## 2022-07-14 RX ADMIN — ALVIMOPAN 12 MG: 12 CAPSULE ORAL at 09:07

## 2022-07-14 RX ADMIN — QUETIAPINE FUMARATE 25 MG: 25 TABLET ORAL at 09:07

## 2022-07-14 RX ADMIN — PANTOPRAZOLE SODIUM 40 MG: 40 TABLET, DELAYED RELEASE ORAL at 09:07

## 2022-07-14 RX ADMIN — ACETAMINOPHEN 1000 MG: 500 TABLET, FILM COATED ORAL at 09:07

## 2022-07-14 RX ADMIN — Medication 6 MG: at 09:07

## 2022-07-14 RX ADMIN — ENOXAPARIN SODIUM 40 MG: 100 INJECTION SUBCUTANEOUS at 04:07

## 2022-07-14 RX ADMIN — ACETAMINOPHEN 999.01 MG: 160 SOLUTION ORAL at 06:07

## 2022-07-14 NOTE — PROGRESS NOTES
[facility] Surg  General Surgery  Progress Note    Subjective:     History of Present Illness:  No notes on file    Post-Op Info:  Procedure(s) (LRB):  GASTRECTOMY (N/A)   2 Days Post-Op     Interval History: AF and HDS. Pulled out his NGT overnight and has been restless overall. Has some slight confusion with pressured speech this AM. Pain well controlled with PCA. Worked with PT/OT yesterday who recommended HH.     Medications:  Continuous Infusions:   hydromorphone in 0.9 % NaCl 6 mg/30 ml      lactated ringers 125 mL/hr at 07/13/22 1817     Scheduled Meds:   acetaminophen  999.0148 mg Per NG tube Q8H    alvimopan  12 mg Oral BID    enoxaparin  40 mg Subcutaneous Daily    LIDOcaine  1 patch Transdermal Q24H    mupirocin  1 g Nasal BID    pantoprazole  40 mg Per NG tube Daily     PRN Meds:naloxone, ondansetron, prochlorperazine     Review of patient's allergies indicates:   Allergen Reactions    Ativan [lorazepam] Anxiety     Patient had paradoxical effect after ativan IV and was so agitated he had to be restrained and transferred to ICU for precedex.     Objective:     Vital Signs (Most Recent):  Temp: 97.6 °F (36.4 °C) (07/14/22 0721)  Pulse: 99 (07/14/22 0721)  Resp: 19 (07/14/22 0736)  BP: (!) 153/87 (07/14/22 0721)  SpO2: 96 % (07/14/22 0721)   Vital Signs (24h Range):  Temp:  [97.4 °F (36.3 °C)-98.3 °F (36.8 °C)] 97.6 °F (36.4 °C)  Pulse:  [] 99  Resp:  [16-19] 19  SpO2:  [93 %-96 %] 96 %  BP: (139-156)/(78-87) 153/87     Weight: 78.3 kg (172 lb 9 oz)  Body mass index is 24.76 kg/m².    Intake/Output - Last 3 Shifts         07/12 0700  07/13 0659 07/13 0700 07/14 0659 07/14 0700  07/15 0659    P.O.  0     I.V. (mL/kg) 1000 (12.8) 1500 (19.2)     Total Intake(mL/kg) 1000 (12.8) 1500 (19.2)     Urine (mL/kg/hr) 100 750 (0.4)     Drains 70      Stool  0     Blood 50      Total Output 220 750     Net +780 +750            Stool Occurrence  0 x             Physical Exam  Vitals and nursing  note reviewed.   Constitutional:       General: He is not in acute distress.     Appearance: He is well-developed. He is not diaphoretic.   Cardiovascular:      Rate and Rhythm: Normal rate and regular rhythm.   Pulmonary:      Effort: Pulmonary effort is normal. No respiratory distress.   Abdominal:      Comments: Incisions with staples in place c/d/I. Dressing changes this morning  Soft, non-distended, appropriately tender   Musculoskeletal:         General: Normal range of motion.   Skin:     General: Skin is warm and dry.   Neurological:      General: No focal deficit present.      Mental Status: He is alert.      Cranial Nerves: No cranial nerve deficit.   Psychiatric:         Mood and Affect: Mood normal.         Behavior: Behavior normal.       Significant Labs:  I have reviewed all pertinent lab results within the past 24 hours.  CBC:   Recent Labs   Lab 07/14/22 0417   WBC 10.46   RBC 3.34*   HGB 7.6*   HCT 26.8*      MCV 80*   MCH 22.8*   MCHC 28.4*     CMP:   Recent Labs   Lab 07/14/22 0417   GLU 99   CALCIUM 8.3*   ALBUMIN 2.7*   PROT 5.9*      K 3.9   CO2 21*      BUN 16   CREATININE 0.9   ALKPHOS 79   ALT 10   AST 21   BILITOT 1.3*       Significant Diagnostics:  I have reviewed all pertinent imaging results/findings within the past 24 hours.    Assessment/Plan:     * Malignant neoplasm of body of stomach  Rayo Duran . is a 86 y.o. 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 Will start on geodon BID PRN  - MM pain meds - adjusted these to PO given NGT is out. Tylenol and gabapentin. PRN oxy  - Will hold off on diet until he has evidence of ROBF   - NPO  - mIVF  - GI ppx  - PT/OT  - DVT ppx        Michelle Smith MD  General Surgery  [facility] [place] - Med Surg

## 2022-07-14 NOTE — PLAN OF CARE
07/14/22 1428   Medicare Message   Important Message from Medicare regarding Discharge Appeal Rights Given to patient/caregiver;Explained to patient/caregiver;Signed/date by patient/caregiver   Date IMM was signed 07/14/22   Time IMM was signed 1425

## 2022-07-14 NOTE — PLAN OF CARE
Problem: Occupational Therapy  Goal: Occupational Therapy Goal  Description: Goals to be met by: 07/27/22     Patient will increase functional independence with ADLs by performing:    UE Dressing with Modified [place].  LE Dressing with Minimal Assistance.  Grooming while standing at sink with Supervision.  Toileting from bedside commode with Supervision for hygiene and clothing management.   Supine to sit with Supervision.  Step transfer with Supervision  Toilet transfer to bedside commode with Supervision.  Upper extremity exercise program x15 reps per handout, with independence.    Outcome: Ongoing, Progressing     SBA- CGA for in-room functional mobility and standing grooming ADLs at the sink.

## 2022-07-14 NOTE — ASSESSMENT & PLAN NOTE
Rayo BILLS Roger Louise. is a 86 y.o. 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 Will start on geodon BID PRN  - MM pain meds - adjusted these to PO given NGT is out. Tylenol and gabapentin. PRN oxy  - Will hold off on diet until he has evidence of ROBF   - NPO  - mIVF  - GI ppx  - PT/OT  - DVT ppx

## 2022-07-14 NOTE — PT/OT/SLP PROGRESS
"Occupational Therapy   Treatment    Name: Rayo Duran Sr.  MRN: [id number]  Admitting Diagnosis:  Malignant neoplasm of body of stomach  2 Days Post-Op    Recommendations:     Discharge Recommendations: home health OT (with family assistance)  Discharge Equipment Recommendations:  none  Barriers to discharge:  None    Assessment:     Rayo Duran Sr. is a 86 y.o. male with a medical diagnosis of Malignant neoplasm of body of stomach. Performance deficits affecting function are weakness, impaired balance, decreased safety awareness, pain, impaired skin, impaired cognition, impaired self care skills, impaired functional mobilty.     SBA- CGA for in-room functional mobility and standing grooming ADLs at the [facility]    Rehab Prognosis:  Good; patient would benefit from acute skilled OT services to address these deficits and reach maximum level of function.       Plan:     Patient to be seen  (3-5x/week) to address the above listed problems via self-care/home management, therapeutic activities, therapeutic exercises  · Plan of Care Expires: 07/27/22  · Plan of Care Reviewed with: patient    Subjective     Chief complaint: none reported   Patient/family comments/ goals: "last night was rough, but they gave me a little cup to drink so I was able to sleep for 2-3 hours today"; eager to walk    Pain/Comfort:  · Pain Rating 1:  (c/o minimal abdominal pain)  · Pain Addressed 1: Reposition, Distraction, Cessation of Activity    Objective:     Communicated with: nurseHanh, prior to session.  Patient found up in chair with  (safety sitter) upon OT entry to room.    General Precautions: Standard, fall (abdominal precautions)   Orthopedic Precautions:N/A   Braces: N/A  Respiratory Status: Room air     Occupational Performance:     Bed Mobility:    · NT; pt found/left up in the chair     Functional Mobility/Transfers:  · Patient completed Sit <> Stand Transfer with contact guard assistance  with  no assistive device   · Functional " Mobility: pt completed in-room functional mobility with CGA- pt reaching out for furniture prn, but declining use of AD. Pt completed further functional mobility with PT in the hallway with 1 standing rest break -  Please refer to PT note for gait assessment.     Activities of Daily Living:  · Grooming: SBA-CGA standing at the sink to brush teeth and wash face        AMPAC 6 Click ADL: 21    Treatment & Education:  · Pt re-educated on OT role/POC.   · Importance of OOB activity with staff assistance.  · Safety during functional t/f and mobility   · Standing, pt completed x14 BLE standing marching with CGA   · Self-care tasks/functional mobility completed- assistance level noted above   · All questions/concerns answered within OT scope of practice       Patient left reclined in the chair with bedside table in front of pt  with all lines intact, call button in reach, safety sitter present and all needs met/within reach Education:      GOALS:   Multidisciplinary Problems     Occupational Therapy Goals        Problem: Occupational Therapy    Goal Priority Disciplines Outcome Interventions   Occupational Therapy Goal     OT, PT/OT Ongoing, Progressing    Description: Goals to be met by: 07/27/22     Patient will increase functional independence with ADLs by performing:    UE Dressing with Modified [place].  LE Dressing with Minimal Assistance.  Grooming while standing at sink with Supervision.  Toileting from bedside commode with Supervision for hygiene and clothing management.   Supine to sit with Supervision.  Step transfer with Supervision  Toilet transfer to bedside commode with Supervision.  Upper extremity exercise program x15 reps per handout, with independence.                     Time Tracking:     OT Date of Treatment: 07/14/22  OT Start Time: 1237  OT Stop Time: 1254  OT Total Time (min): 17 min    Billable Minutes:Self Care/Home Management 8 min (co-treat with PT)  Total Time 17 min     Co- treatment  performed due to safety concerns from restless night/morning requiring two skilled therapists to appropriately and safely assess patient's strength and endurance while facilitating functional tasks in addition to accommodating for patient's activity tolerance      OT/ANITA: OT     ANITA Visit Number: 0    7/14/2022

## 2022-07-14 NOTE — PT/OT/SLP PROGRESS
"Physical Therapy Treatment    Patient Name:  Rayo Duran Sr.   MRN:  [id number]    Recommendations:     Discharge Recommendations:  home health PT (24hr supervision/assist PRN)   Discharge Equipment Recommendations: none   Barriers to discharge: Pt confused, pulling out lines    Assessment:     Rayo Duran Sr. is a 86 y.o. male admitted with a medical diagnosis of Malignant neoplasm of body of stomach.  He presents with the following impairments/functional limitations:  weakness, impaired balance, decreased safety awareness, impaired skin, impaired endurance, pain, impaired cognition, impaired self care skills, decreased coordination, impaired functional mobilty, impaired coordination, gait instability Pt progressing despite confusion.    Rehab Prognosis: Good; patient would benefit from acute skilled PT services to address these deficits and reach maximum level of function.    Recent Surgery: Procedure(s) (LRB):  GASTRECTOMY (N/A) 2 Days Post-Op    Plan:     During this hospitalization, patient to be seen  (3-5x/wk) to address the identified rehab impairments via gait training, therapeutic activities, therapeutic exercises and progress toward the following goals:    · Plan of Care Expires:  07/27/22    Subjective     Chief Complaint: pain, "they won't let me walk, I can walk"  Patient/Family Comments/goals: to walk, denies need for RW or SPC  Pain/Comfort:  Pain Rating 1:  (No c/o)      Objective:     Communicated with nsg prior to session.  Patient found up in chair with NG tube, bed alarm, SCD, PCA, oxygen, peripheral IV and safety sitter upon PT entry to room.     General Precautions: Standard,  (Abdominal precautions)   Orthopedic Precautions:N/A   Braces:  (Abdominal binder?, may have been inadvertantly removed by pt)  Respiratory Status: Room air     Functional Mobility:  · Transfers:     · Sit to Stand:  stand by assistance with no AD  · Gait: Pt ambulated with CGA approx 450' with occasional crossing " over, but no LOB.  Pt reaching for walls and furniture when ambulating in room, states its a habit and he does it at home   · Balance: FAir+ sit, Fair+/fair stand      AM-PAC 6 CLICK MOBILITY  Turning over in bed (including adjusting bedclothes, sheets and blankets)?: 3  Sitting down on and standing up from a chair with arms (e.g., wheelchair, bedside commode, etc.): 3  Moving from lying on back to sitting on the side of the bed?: 3  Moving to and from a bed to a chair (including a wheelchair)?: 3  Need to walk in hospital room?: 3  Climbing 3-5 steps with a railing?: 3  Basic Mobility Total Score: 18       Therapeutic Activities and Exercises:   Educated pt to perform B AP's and TKE's while up in chair.  Pt demonstrated understanding.    Patient left up in chair with all lines intact, call button in reach, nsg notified and safety sitter present..    GOALS:   Multidisciplinary Problems     Physical Therapy Goals        Problem: Physical Therapy    Goal Priority Disciplines Outcome Goal Variances Interventions   Physical Therapy Goal     PT, PT/OT Ongoing, Progressing     Description: Goals to be met by: 22     Patient will increase functional independence with mobility by performin. Supine to sit with Supervision  2. Rolling to Left and Right with Supervision  3. Sit to stand transfer with Supervision  4. Bed to chair transfer with Supervision   5. Gait  x 100 feet with Supervision with or without AD   6. Lower extremity exercise program x10 reps per handout, with supervision                     Time Tracking:     PT Received On: 22  PT Start Time: 1246     PT Stop Time: 1255  PT Total Time (min): 9 min     Billable Minutes: Therapeutic Exercise 9    Treatment Type: Treatment  PT/PTA: PT     PTA Visit Number: 0     2022

## 2022-07-14 NOTE — PLAN OF CARE
[place] [facility]  Initial Discharge Assessment    Patient from home and lives with spouse and daughter, who will be his help at home. Pt current with [facility], and plans to resume once medically clear to discharge. Pt currently has rollator, rw, bath bench and bsc at home. TN to continue to follow for discharge needs.     Clinical updates packet sent to [facility] via care [employer].        Primary Care Provider: Caroline Florez MD    Admission Diagnosis: Malignant neoplasm of body of stomach [C16.2]    Admission Date: 7/12/2022  Expected Discharge Date:     Discharge Barriers Identified: None    Payor: MEDICARE / Plan: MEDICARE PART A & B / Product Type: Government /     Extended Emergency Contact Information  Primary Emergency Contact: PAMELA REYNA  Mobile Phone: [phone]  Relation: Spouse  Preferred language: English  Secondary Emergency Contact: Kinga Reyna Jr.  Mobile Phone: [phone]  Relation: Son  Preferred language: English   needed? No    Discharge Plan A: Home Health  Discharge Plan B: Home with family      [facility] [address] [place] [id number]  Phone: [phone] Fax: [phone]    [facility] [address]  [address]   [facility] [address]  Phone: [phone] Fax: [phone]      Initial Assessment (most recent)     Adult Discharge Assessment - 07/14/22 1432        Discharge Assessment    Assessment Type Discharge Planning Assessment     Confirmed/corrected address, phone number and insurance Yes     Confirmed Demographics Correct on Facesheet     Source of Information patient;family;health record     When was your last doctors appointment? 07/06/22     Does patient/caregiver understand observation status No     Was observation education provided? No     Communicated PHILLIP with patient/caregiver Date not available/Unable to determine     Reason For Admission Malignant neoplasm     Lives With child(kristin),  adult;spouse     Facility Arrived From: Home     Do you expect to return to your current living situation? Yes     Do you have help at home or someone to help you manage your care at home? Yes     Who are your caregiver(s) and their phone number(s)? PAMELA REYNA (Spouse)   [phone]     Prior to hospitilization cognitive status: Alert/Oriented   [place]    Current cognitive status: Alert/Oriented     Walking or Climbing Stairs Difficulty ambulation difficulty, requires equipment     Dressing/Bathing Difficulty bathing difficulty, requires equipment     Equipment Currently Used at Home grab bar;bath bench;walker, rolling;rollator;bedside commode     Readmission within 30 days? No     Patient currently being followed by outpatient case management? No     Do you currently have service(s) that help you manage your care at home? Yes     Name and Contact number of agency [facility]     Is the pt/caregiver preference to resume services with current agency Yes     Do you take prescription medications? Yes     Do you have prescription coverage? Yes     Coverage Medicare     Do you have any problems affording any of your prescribed medications? No     Is the patient taking medications as prescribed? yes     Who is going to help you get home at discharge? PAMELA REYNA (Spouse)   [phone]     How do you get to doctors appointments? family or friend will provide     Are you on dialysis? No     Do you take coumadin? No     Discharge Plan A Home Health     Discharge Plan B Home with family     DME Needed Upon Discharge  other (see comments)   TBD    Discharge Plan discussed with: Spouse/sig other;Adult children;Patient     Name(s) and Number(s) PAMELA REYNA (Spouse)   [phone]     Discharge Barriers Identified None        Relationship/Environment    Name(s) of Who Lives With Patient PAMELA REYNA (Spouse)   [phone]

## 2022-07-14 NOTE — NURSING
PATIENT PULLED OUT HIS ng TUBE EARLIER DURING THE SHIFT, THE NEW CHARGE NURSE CLEANED PAT IENT AND DISPOSED OF THE TUBE. DR. Emely Elliott  NOTIFIED OF ACTION DO NOT REPLACE ANYI LEAVE THE TUBE OUT.

## 2022-07-14 NOTE — PLAN OF CARE
Problem: Physical Therapy  Goal: Physical Therapy Goal  Description: Goals to be met by: 22     Patient will increase functional independence with mobility by performin. Supine to sit with Supervision  2. Rolling to Left and Right with Supervision  3. Sit to stand transfer with Supervision  4. Bed to chair transfer with Supervision   5. Gait  x 100 feet with Supervision with or without AD   6. Lower extremity exercise program x10 reps per handout, with supervision    Outcome: Ongoing, Progressing   Pt progressing despite confusion.  Continue with POC.

## 2022-07-14 NOTE — PT/OT/SLP PROGRESS
Occupational Therapy      Patient Name:  Rayo Duran .   MRN:  [id number]    Patient not seen today secondary to: pt just pulled out IV and housekeeping present to clean up with nurse addressing pt's needs. Will follow-up later.    7/14/2022

## 2022-07-14 NOTE — SUBJECTIVE & OBJECTIVE
Interval History: AF and HDS. Pulled out his NGT overnight and has been restless overall. Has some slight confusion with pressured speech this AM. Pain well controlled with PCA. Worked with PT/OT yesterday who recommended HH.     Medications:  Continuous Infusions:   hydromorphone in 0.9 % NaCl 6 mg/30 ml      lactated ringers 125 mL/hr at 07/13/22 1817     Scheduled Meds:   acetaminophen  999.0148 mg Per NG tube Q8H    alvimopan  12 mg Oral BID    enoxaparin  40 mg Subcutaneous Daily    LIDOcaine  1 patch Transdermal Q24H    mupirocin  1 g Nasal BID    pantoprazole  40 mg Per NG tube Daily     PRN Meds:naloxone, ondansetron, prochlorperazine     Review of patient's allergies indicates:   Allergen Reactions    Ativan [lorazepam] Anxiety     Patient had paradoxical effect after ativan IV and was so agitated he had to be restrained and transferred to ICU for precedex.     Objective:     Vital Signs (Most Recent):  Temp: 97.6 °F (36.4 °C) (07/14/22 0721)  Pulse: 99 (07/14/22 0721)  Resp: 19 (07/14/22 0736)  BP: (!) 153/87 (07/14/22 0721)  SpO2: 96 % (07/14/22 0721)   Vital Signs (24h Range):  Temp:  [97.4 °F (36.3 °C)-98.3 °F (36.8 °C)] 97.6 °F (36.4 °C)  Pulse:  [] 99  Resp:  [16-19] 19  SpO2:  [93 %-96 %] 96 %  BP: (139-156)/(78-87) 153/87     Weight: 78.3 kg (172 lb 9 oz)  Body mass index is 24.76 kg/m².    Intake/Output - Last 3 Shifts         07/12 0700  07/13 0659 07/13 0700  07/14 0659 07/14 0700  07/15 0659    P.O.  0     I.V. (mL/kg) 1000 (12.8) 1500 (19.2)     Total Intake(mL/kg) 1000 (12.8) 1500 (19.2)     Urine (mL/kg/hr) 100 750 (0.4)     Drains 70      Stool  0     Blood 50      Total Output 220 750     Net +780 +750            Stool Occurrence  0 x             Physical Exam  Vitals and nursing note reviewed.   Constitutional:       General: He is not in acute distress.     Appearance: He is well-developed. He is not diaphoretic.   Cardiovascular:      Rate and Rhythm: Normal rate and regular  rhythm.   Pulmonary:      Effort: Pulmonary effort is normal. No respiratory distress.   Abdominal:      Comments: Incisions with staples in place c/d/I. Dressing changes this morning  Soft, non-distended, appropriately tender   Musculoskeletal:         General: Normal range of motion.   Skin:     General: Skin is warm and dry.   Neurological:      General: No focal deficit present.      Mental Status: He is alert.      Cranial Nerves: No cranial nerve deficit.   Psychiatric:         Mood and Affect: Mood normal.         Behavior: Behavior normal.       Significant Labs:  I have reviewed all pertinent lab results within the past 24 hours.  CBC:   Recent Labs   Lab 07/14/22 0417   WBC 10.46   RBC 3.34*   HGB 7.6*   HCT 26.8*      MCV 80*   MCH 22.8*   MCHC 28.4*     CMP:   Recent Labs   Lab 07/14/22 0417   GLU 99   CALCIUM 8.3*   ALBUMIN 2.7*   PROT 5.9*      K 3.9   CO2 21*      BUN 16   CREATININE 0.9   ALKPHOS 79   ALT 10   AST 21   BILITOT 1.3*       Significant Diagnostics:  I have reviewed all pertinent imaging results/findings within the past 24 hours.

## 2022-07-15 LAB
ALBUMIN SERPL BCP-MCNC: 2.4 G/DL (ref 3.5–5.2)
ALP SERPL-CCNC: 72 U/L (ref 55–135)
ALT SERPL W/O P-5'-P-CCNC: 9 U/L (ref 10–44)
ANION GAP SERPL CALC-SCNC: 9 MMOL/L (ref 8–16)
AST SERPL-CCNC: 19 U/L (ref 10–40)
BASOPHILS # BLD AUTO: 0.03 K/UL (ref 0–0.2)
BASOPHILS # BLD AUTO: 0.03 K/UL (ref 0–0.2)
BASOPHILS NFR BLD: 0.5 % (ref 0–1.9)
BASOPHILS NFR BLD: 0.5 % (ref 0–1.9)
BILIRUB SERPL-MCNC: 2.4 MG/DL (ref 0.1–1)
BUN SERPL-MCNC: 13 MG/DL (ref 8–23)
CALCIUM SERPL-MCNC: 8.1 MG/DL (ref 8.7–10.5)
CHLORIDE SERPL-SCNC: 108 MMOL/L (ref 95–110)
CO2 SERPL-SCNC: 22 MMOL/L (ref 23–29)
CREAT SERPL-MCNC: 0.8 MG/DL (ref 0.5–1.4)
DIFFERENTIAL METHOD: ABNORMAL
DIFFERENTIAL METHOD: ABNORMAL
EOSINOPHIL # BLD AUTO: 0.2 K/UL (ref 0–0.5)
EOSINOPHIL # BLD AUTO: 0.2 K/UL (ref 0–0.5)
EOSINOPHIL NFR BLD: 3.3 % (ref 0–8)
EOSINOPHIL NFR BLD: 3.3 % (ref 0–8)
ERYTHROCYTE [DISTWIDTH] IN BLOOD BY AUTOMATED COUNT: 19.2 % (ref 11.5–14.5)
ERYTHROCYTE [DISTWIDTH] IN BLOOD BY AUTOMATED COUNT: 19.2 % (ref 11.5–14.5)
EST. GFR  (AFRICAN AMERICAN): >60 ML/MIN/1.73 M^2
EST. GFR  (NON AFRICAN AMERICAN): >60 ML/MIN/1.73 M^2
GLUCOSE SERPL-MCNC: 85 MG/DL (ref 70–110)
HCT VFR BLD AUTO: 27.1 % (ref 40–54)
HCT VFR BLD AUTO: 27.1 % (ref 40–54)
HGB BLD-MCNC: 8.1 G/DL (ref 14–18)
HGB BLD-MCNC: 8.1 G/DL (ref 14–18)
IMM GRANULOCYTES # BLD AUTO: 0.02 K/UL (ref 0–0.04)
IMM GRANULOCYTES # BLD AUTO: 0.02 K/UL (ref 0–0.04)
IMM GRANULOCYTES NFR BLD AUTO: 0.3 % (ref 0–0.5)
IMM GRANULOCYTES NFR BLD AUTO: 0.3 % (ref 0–0.5)
LYMPHOCYTES # BLD AUTO: 0.7 K/UL (ref 1–4.8)
LYMPHOCYTES # BLD AUTO: 0.7 K/UL (ref 1–4.8)
LYMPHOCYTES NFR BLD: 11 % (ref 18–48)
LYMPHOCYTES NFR BLD: 11 % (ref 18–48)
MCH RBC QN AUTO: 24.3 PG (ref 27–31)
MCH RBC QN AUTO: 24.3 PG (ref 27–31)
MCHC RBC AUTO-ENTMCNC: 29.9 G/DL (ref 32–36)
MCHC RBC AUTO-ENTMCNC: 29.9 G/DL (ref 32–36)
MCV RBC AUTO: 81 FL (ref 82–98)
MCV RBC AUTO: 81 FL (ref 82–98)
MONOCYTES # BLD AUTO: 0.3 K/UL (ref 0.3–1)
MONOCYTES # BLD AUTO: 0.3 K/UL (ref 0.3–1)
MONOCYTES NFR BLD: 4.3 % (ref 4–15)
MONOCYTES NFR BLD: 4.3 % (ref 4–15)
NEUTROPHILS # BLD AUTO: 5.2 K/UL (ref 1.8–7.7)
NEUTROPHILS # BLD AUTO: 5.2 K/UL (ref 1.8–7.7)
NEUTROPHILS NFR BLD: 80.6 % (ref 38–73)
NEUTROPHILS NFR BLD: 80.6 % (ref 38–73)
NRBC BLD-RTO: 0 /100 WBC
NRBC BLD-RTO: 0 /100 WBC
PLATELET # BLD AUTO: 238 K/UL (ref 150–450)
PLATELET # BLD AUTO: 238 K/UL (ref 150–450)
PMV BLD AUTO: 9.2 FL (ref 9.2–12.9)
PMV BLD AUTO: 9.2 FL (ref 9.2–12.9)
POTASSIUM SERPL-SCNC: 3.8 MMOL/L (ref 3.5–5.1)
PROT SERPL-MCNC: 5.5 G/DL (ref 6–8.4)
RBC # BLD AUTO: 3.33 M/UL (ref 4.6–6.2)
RBC # BLD AUTO: 3.33 M/UL (ref 4.6–6.2)
SODIUM SERPL-SCNC: 139 MMOL/L (ref 136–145)
WBC # BLD AUTO: 6.46 K/UL (ref 3.9–12.7)
WBC # BLD AUTO: 6.46 K/UL (ref 3.9–12.7)

## 2022-07-15 PROCEDURE — 11000001 HC ACUTE MED/SURG PRIVATE ROOM

## 2022-07-15 PROCEDURE — 63600175 PHARM REV CODE 636 W HCPCS: Performed by: STUDENT IN AN ORGANIZED HEALTH CARE EDUCATION/TRAINING PROGRAM

## 2022-07-15 PROCEDURE — 36415 COLL VENOUS BLD VENIPUNCTURE: CPT | Performed by: STUDENT IN AN ORGANIZED HEALTH CARE EDUCATION/TRAINING PROGRAM

## 2022-07-15 PROCEDURE — 97530 THERAPEUTIC ACTIVITIES: CPT | Mod: CO

## 2022-07-15 PROCEDURE — 97535 SELF CARE MNGMENT TRAINING: CPT | Mod: CO

## 2022-07-15 PROCEDURE — 85025 COMPLETE CBC W/AUTO DIFF WBC: CPT | Performed by: STUDENT IN AN ORGANIZED HEALTH CARE EDUCATION/TRAINING PROGRAM

## 2022-07-15 PROCEDURE — 97110 THERAPEUTIC EXERCISES: CPT

## 2022-07-15 PROCEDURE — 25000003 PHARM REV CODE 250: Performed by: SURGERY

## 2022-07-15 PROCEDURE — 25000003 PHARM REV CODE 250: Performed by: STUDENT IN AN ORGANIZED HEALTH CARE EDUCATION/TRAINING PROGRAM

## 2022-07-15 PROCEDURE — 80053 COMPREHEN METABOLIC PANEL: CPT | Performed by: STUDENT IN AN ORGANIZED HEALTH CARE EDUCATION/TRAINING PROGRAM

## 2022-07-15 RX ORDER — DROPERIDOL 2.5 MG/ML
2.5 INJECTION, SOLUTION INTRAMUSCULAR; INTRAVENOUS ONCE
Status: DISCONTINUED | OUTPATIENT
Start: 2022-07-15 | End: 2022-07-16 | Stop reason: HOSPADM

## 2022-07-15 RX ADMIN — ENOXAPARIN SODIUM 40 MG: 100 INJECTION SUBCUTANEOUS at 05:07

## 2022-07-15 RX ADMIN — ALVIMOPAN 12 MG: 12 CAPSULE ORAL at 09:07

## 2022-07-15 RX ADMIN — ACETAMINOPHEN 1000 MG: 500 TABLET, FILM COATED ORAL at 09:07

## 2022-07-15 RX ADMIN — MUPIROCIN 1 G: 20 OINTMENT TOPICAL at 09:07

## 2022-07-15 RX ADMIN — ALVIMOPAN 12 MG: 12 CAPSULE ORAL at 10:07

## 2022-07-15 RX ADMIN — PANTOPRAZOLE SODIUM 40 MG: 40 TABLET, DELAYED RELEASE ORAL at 10:07

## 2022-07-15 RX ADMIN — SODIUM CHLORIDE, SODIUM LACTATE, POTASSIUM CHLORIDE, AND CALCIUM CHLORIDE: .6; .31; .03; .02 INJECTION, SOLUTION INTRAVENOUS at 05:07

## 2022-07-15 NOTE — PLAN OF CARE
Problem: Occupational Therapy  Goal: Occupational Therapy Goal  Description: Goals to be met by: 07/27/22     Patient will increase functional independence with ADLs by performing:    UE Dressing with Modified [place].  LE Dressing with Minimal Assistance. Met 7/15/22  Grooming while standing at sink with Supervision.   Toileting from bedside commode with Supervision for hygiene and clothing management.   Supine to sit with Supervision. Met 7/15/22  Step transfer with Supervision  Toilet transfer to bedside commode with Supervision.  Upper extremity exercise program x15 reps per handout, with independence.    Outcome: Ongoing, Progressing     Patient pleasantly participated in treatment on this date. Patient demonstrates compliance with abdominal precautions with use of log roll technique rolling to L side with supervision. Sit<>stand with SBA using a RW. Patient ambulated bed<>sink with CGA/SBA using a RW.

## 2022-07-15 NOTE — PLAN OF CARE
Room air.   Blood transfusion completed during shift, pt tolerated well.  No falls or new injuries reported during shift, safety precautions maintained.     Problem: Adult Inpatient Plan of Care  Goal: Plan of Care Review  Outcome: Ongoing, Progressing     Problem: Adult Inpatient Plan of Care  Goal: Optimal Comfort and Wellbeing  Outcome: Ongoing, Progressing

## 2022-07-15 NOTE — SUBJECTIVE & OBJECTIVE
Interval History: AF and HDS. Had a BM. However, increased irritation requiring restraints and droperidol. Pain well controlled.       Medications:  Continuous Infusions:   lactated ringers 125 mL/hr at 07/15/22 0519     Scheduled Meds:   acetaminophen  1,000 mg Oral Q8H    alvimopan  12 mg Oral BID    droperidoL  2.5 mg Intravenous Once    enoxaparin  40 mg Subcutaneous Daily    melatonin  6 mg Oral Nightly    mupirocin  1 g Nasal BID    pantoprazole  40 mg Oral Daily    QUEtiapine  25 mg Oral QHS     PRN Meds:sodium chloride, naloxone, ondansetron, oxyCODONE, prochlorperazine, ziprasidone     Review of patient's allergies indicates:   Allergen Reactions    Ativan [lorazepam] Anxiety     Patient had paradoxical effect after ativan IV and was so agitated he had to be restrained and transferred to ICU for precedex.     Objective:     Vital Signs (Most Recent):  Temp: 97.7 °F (36.5 °C) (07/15/22 0610)  Pulse: 70 (07/15/22 0610)  Resp: 18 (07/15/22 0610)  BP: 120/69 (07/15/22 0610)  SpO2: 95 % (07/15/22 0610)   Vital Signs (24h Range):  Temp:  [97.6 °F (36.4 °C)-98.3 °F (36.8 °C)] 97.7 °F (36.5 °C)  Pulse:  [] 70  Resp:  [16-20] 18  SpO2:  [93 %-98 %] 95 %  BP: (108-162)/(56-87) 120/69     Weight: 78 kg (172 lb)  Body mass index is 24.68 kg/m².    Intake/Output - Last 3 Shifts         07/13 0700  07/14 0659 07/14 0700  07/15 0659 07/15 0700  07/16 0659    P.O. 0 0     I.V. (mL/kg) 1500 (19.2) 22 (0.3)     Blood  442.5     Total Intake(mL/kg) 1500 (19.2) 464.5 (6)     Urine (mL/kg/hr) 750 (0.4)      Drains 0      Stool 0      Blood       Total Output 750      Net +750 +464.5            Urine Occurrence 2 x      Stool Occurrence 0 x              Physical Exam  Vitals and nursing note reviewed.   Constitutional:       General: He is not in acute distress.     Appearance: He is well-developed. He is not diaphoretic.      Comments: Resting comfortably   Cardiovascular:      Rate and Rhythm: Normal rate and regular  rhythm.   Pulmonary:      Effort: Pulmonary effort is normal. No respiratory distress.   Abdominal:      Comments: Incisions with staples in place c/d/I. Dressing changes this morning  Soft, non-distended, appropriately tender   Musculoskeletal:         General: Normal range of motion.      Comments: Restraints in place   Skin:     General: Skin is warm and dry.   Neurological:      General: No focal deficit present.      Cranial Nerves: No cranial nerve deficit.   Psychiatric:         Mood and Affect: Mood normal.         Behavior: Behavior normal.       Significant Labs:  I have reviewed all pertinent lab results within the past 24 hours.  CBC:   Recent Labs   Lab 07/14/22 0417   WBC 10.46   RBC 3.34*   HGB 7.6*   HCT 26.8*      MCV 80*   MCH 22.8*   MCHC 28.4*       CMP:   Recent Labs   Lab 07/14/22 0417   GLU 99   CALCIUM 8.3*   ALBUMIN 2.7*   PROT 5.9*      K 3.9   CO2 21*      BUN 16   CREATININE 0.9   ALKPHOS 79   ALT 10   AST 21   BILITOT 1.3*         Significant Diagnostics:  I have reviewed all pertinent imaging results/findings within the past 24 hours.

## 2022-07-15 NOTE — PLAN OF CARE
Problem: Adult Inpatient Plan of Care  Goal: Plan of Care Review  Outcome: Ongoing, Progressing  Goal: Patient-Specific Goal (Individualized)  Outcome: Ongoing, Progressing  Goal: Absence of Hospital-Acquired Illness or Injury  Outcome: Ongoing, Progressing  Goal: Optimal Comfort and Wellbeing  Outcome: Ongoing, Progressing  Goal: Readiness for Transition of Care  Outcome: Ongoing, Progressing     Problem: Infection  Goal: Absence of Infection Signs and Symptoms  Outcome: Ongoing, Progressing     Problem: Fall Injury Risk  Goal: Absence of Fall and Fall-Related Injury  Outcome: Ongoing, Progressing     Problem: Skin Injury Risk Increased  Goal: Skin Health and Integrity  Outcome: Ongoing, Progressing   Patient was initially confused this am, very upset, restraints were removed patient spoke with MD, he calmed for the evening

## 2022-07-15 NOTE — NURSING
Pt became very agitated d/t staff cleaning pt.   Staff explained to pt that he had incontinence BM and we were cleaning him.  Pt cleaned. Attempted to restart IVF, pt then attempted to hit this nurse.  Attempted to redirect pt, and did not work.  Pt then starting swinging and kicking extremities x4.  BUE restraints placed on pt.  Pt got loose from restraint and hit tech.  Emily HU made aware. Restraints order placed per verbal order.  Will administer medication for agitiation per MD verbal orders

## 2022-07-15 NOTE — PT/OT/SLP PROGRESS
"Occupational Therapy   Treatment    Name: Rayo Duran Sr.  MRN: [id number]  Admitting Diagnosis:  Malignant neoplasm of body of stomach  3 Days Post-Op    Recommendations:     Discharge Recommendations: home health OT (with family assistance)  Discharge Equipment Recommendations:  none, lift device  Barriers to discharge:  None    Assessment:     Rayo Duran Sr. is a 86 y.o. male with a medical diagnosis of Malignant neoplasm of body of stomach. Performance deficits affecting function are decreased safety awareness, gait instability, weakness, pain, impaired balance, impaired cognition, impaired self care skills, impaired skin.     Patient pleasantly participated in treatment on this date. Patient demonstrates compliance with abdominal precautions with use of log roll technique rolling to L side with supervision. Sit<>stand with SBA using a RW. Patient ambulated bed<>sink with CGA/SBA using a RW.     Rehab Prognosis:  Good; patient would benefit from acute skilled OT services to address these deficits and reach maximum level of function.       Plan:     Patient to be seen  (3-5 x/week) to address the above listed problems via self-care/home management, therapeutic activities, therapeutic exercises  · Plan of Care Expires: 07/27/22  · Plan of Care Reviewed with: patient, spouse    Subjective     S: "I feel good. Just can't sleep at night."    Pain/Comfort:  · Pain Rating 1: 0/10    Objective:     Communicated with: Nurse Schafer prior to session.  Patient found HOB elevated with bed alarm, wife present at the bedside and sitter upon OT entry to room.    General Precautions: Standard, fall (Abdominal precautions)   Orthopedic Precautions:N/A   Braces: N/A  Respiratory Status: Room air     Occupational Performance:     Bed Mobility:    · Patient completed Rolling/Turning to Left with supervision  · Patient completed Scooting hips toward HOB with supervision  · Patient completed Supine to Sit with with supervision using " the log roll technique  · Patient completed Sit to Supine with supervision using the log roll technique    Functional Mobility/Transfers:  · Patient completed Sit <> Stand Transfer with stand by assistance  with no assistive device  · Functional Mobility: Patient ambulated in room from bed<>sink with CGA/SBA using a rolling walker. Patient tolerated dynamic standing activity to perform grooming tasks standing at the sink with CGA/SBA x 5 minutes with no LOB.    Activities of Daily Living:  · Grooming: Patient washed his face and completed oral hygiene with SBA standing at the sink using a rolling walker  · Bathing: Patient bathed B UE with set-up assistance sitting unsupported at EOB.  · Upper Body Dressing: Patient doffed/donned gown with set-up assistance sitting unsupported at EOB  · Lower Body Dressing: Patient able to doff/don B non-slip socks with stand by assistance using figure 4 position of B LE       [facility] 6 Click ADL: 21    Treatment & Education:  Education provided to patient and spouse in home safety, handout provided. Patient pleasantly and actively engaged in treatment on this date to complete bed mobility, transfers, functional mobility and ADLs with assistance levels as detailed above. Patient requires minimal VC for safety and use of RW with functional mobility.     Patient left HOB elevated with all lines intact, call button in reach, bed alarm on, nurse notified and sitter and wife presentEducation:      GOALS:   Multidisciplinary Problems     Occupational Therapy Goals        Problem: Occupational Therapy    Goal Priority Disciplines Outcome Interventions   Occupational Therapy Goal     OT, PT/OT Ongoing, Progressing    Description: Goals to be met by: 07/27/22     Patient will increase functional independence with ADLs by performing:    UE Dressing with Modified [place].  LE Dressing with Minimal Assistance. Met 7/15/22  Grooming while standing at sink with Supervision.   Toileting from  bedside commode with Supervision for hygiene and clothing management.   Supine to sit with Supervision. Met 7/15/22  Step transfer with Supervision  Toilet transfer to bedside commode with Supervision.  Upper extremity exercise program x15 reps per handout, with independence.                     Time Tracking:     OT Date of Treatment: 07/15/22  OT Start Time: 1036  OT Stop Time: 1104  OT Total Time (min): 28 min    Billable Minutes:Self Care/Home Management 19  Therapeutic Activity 9  Total Time 28    OT/ANITA: ANITA     ANITA Visit Number: 1    7/15/2022    A client care conference was completed by the Occupational Therapist and the Occupational Therapy Assistant prior to treatment by the Occupational Therapy Assistant to discuss the patient's plan of care and current status.
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
## 2022-07-15 NOTE — ASSESSMENT & PLAN NOTE
Rayo BILLS Roger Louise. is a 86 y.o. 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 geodon BID PRN  - MM pain meds. Tylenol. PRN oxy  - Regular diet  - dc mIVF  - GI ppx  - nightly seroquel  - PT/OT  - DVT ppx
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
## 2022-07-15 NOTE — PLAN OF CARE
[facility] Surg    HOME HEALTH ORDERS  FACE TO FACE ENCOUNTER    Patient Name: Rayo Duran Sr.  YOB: 1935    PCP: Caroline Florez MD   PCP Address: [address] [place] [address]  PCP Phone Number: [phone]  PCP Fax: [phone]    Encounter Date: 07/15/2022    Admit to Home Health    Diagnoses:  Active Hospital Problems    Diagnosis  POA    *Malignant neoplasm of body of stomach [C16.2]  Yes      Resolved Hospital Problems   No resolved problems to display.       Future Appointments   Date Time Provider Department Center   7/20/2022  2:30 PM Jordan Montesinos MD [facility]   7/26/2022 11:00 AM Ricco Gandara MD [facility]   9/13/2022 11:15 AM Caroline Florez MD [facility]           I have seen and examined this patient face to face today. My clinical findings that support the need for the home health skilled services and home bound status are the following:  Weakness/numbness causing balance and gait disturbance due to Surgery making it taxing to leave home.    Allergies:  Review of patient's allergies indicates:   Allergen Reactions    Ativan [lorazepam] Anxiety     Patient had paradoxical effect after ativan IV and was so agitated he had to be restrained and transferred to ICU for precedex.       Diet: regular diet    Activities: activity as tolerated    Nursing:   SN to complete comprehensive assessment including routine vital signs. Instruct on disease process and s/s of complications to report to MD. Review/verify medication list sent home with the patient at time of discharge  and instruct patient/caregiver as needed. Frequency may be adjusted depending on start of care date. If patient has enteral feeding tube (NG, PEG, J-tube, G-tube), flush tube before and after feeding and/or medication administration with 20-30 mL of water.    Notify MD if SBP > 160 or < 90; DBP > 90 or < 50; HR > 120 or < 50; Temp > 101; Other:           CONSULTS:    Physical Therapy to evaluate and treat. Evaluate for home safety and equipment needs; Establish/upgrade home exercise program. Perform / instruct on therapeutic exercises, gait training, transfer training, and Range of Motion.  Occupational Therapy to evaluate and treat. Evaluate home environment for safety and equipment needs. Perform/Instruct on transfers, ADL training, ROM, and therapeutic exercises.    MISCELLANEOUS CARE:  N/A    WOUND CARE ORDERS  no      Medications: Review discharge medications with patient and family and provide education.      Current Discharge Medication List      CONTINUE these medications which have NOT CHANGED    Details   pantoprazole (PROTONIX) 40 MG tablet Take 1 tablet (40 mg total) by mouth 2 (two) times daily.  Qty: 60 tablet, Refills: 5    Associated Diagnoses: Gastroesophageal reflux disease, unspecified whether esophagitis present      traZODone (DESYREL) 50 MG tablet Take 1 tablet (50 mg total) by mouth every evening.  Qty: 30 tablet, Refills: 0    Associated Diagnoses: Insomnia, unspecified type         STOP taking these medications       rivaroxaban (XARELTO) 10 mg Tab Comments:   Reason for Stopping:               I certify that this patient is confined to his home and needs physical therapy and occupational therapy.

## 2022-07-15 NOTE — PT/OT/SLP PROGRESS
Physical Therapy Treatment    Patient Name:  Rayo Duran Sr.   MRN:  [id number]    Recommendations:     Discharge Recommendations:  home health PT (24hr sup/assist PRN)   Discharge Equipment Recommendations: none   Barriers to discharge: 24hr sup/assist PRN recommended    Assessment:     Rayo Duran Sr. is a 86 y.o. male admitted with a medical diagnosis of Malignant neoplasm of body of stomach.  He presents with the following impairments/functional limitations:  impaired balance, decreased safety awareness, gait instability, decreased coordination, impaired cognition Pt continues to progress despite confusion.    Rehab Prognosis: Good; patient would benefit from acute skilled PT services to address these deficits and reach maximum level of function.    Recent Surgery: Procedure(s) (LRB):  GASTRECTOMY (N/A) 3 Days Post-Op    Plan:     During this hospitalization, patient to be seen  (3-5x/wk) to address the identified rehab impairments via gait training, therapeutic activities, therapeutic exercises and progress toward the following goals:    · Plan of Care Expires:  07/27/22    Subjective     Chief Complaint: Upset with nursing staff  Patient/Family Comments/goals: to walk, pt calm and cooperative  Pain/Comfort:  Pain Rating 1: 0/10      Objective:     Communicated with nsg prior to session.  Patient found HOB elevated with bed alarm (Safety sitter) upon PT entry to room.     General Precautions: Standard, fall (Abdominal precautions)   Orthopedic Precautions:N/A   Braces: N/A  Respiratory Status: Room air     Functional Mobility:  · Bed Mobility:     · Scooting: modified independence  · Supine to Sit: modified independence  · Transfers:     · Sit to Stand:  modified independence with no AD  · Gait: SBA/CGA approx 450' with occasional crossover, But no LOB  · Balance: Good-/Fair+      AM-PAC 6 CLICK MOBILITY  Turning over in bed (including adjusting bedclothes, sheets and blankets)?: 4  Sitting down on and  standing up from a chair with arms (e.g., wheelchair, bedside commode, etc.): 4  Moving from lying on back to sitting on the side of the bed?: 4  Moving to and from a bed to a chair (including a wheelchair)?: 4  Need to walk in hospital room?: 3  Climbing 3-5 steps with a railing?: 3  Basic Mobility Total Score: 22       Therapeutic Activities and Exercises:   Pt performed B AP's, Heel/Toe raises, Hip ABd/flex/Ext, and Min-squats in standing with RW and SBA x 10 reps ea.  Pt sat at EOB with supervision and performed B AP's, FAQ's, Marching, and Hip ABd/ADd x 10 reps.  Handouts provided for TE as above    Patient left sitting edge of bed with call button in reach, bed alarm on, nsg notified and safety sitter and spouse present..    GOALS:   Multidisciplinary Problems     Physical Therapy Goals        Problem: Physical Therapy    Goal Priority Disciplines Outcome Goal Variances Interventions   Physical Therapy Goal     PT, PT/OT Ongoing, Progressing     Description: Goals to be met by: 22     Patient will increase functional independence with mobility by performin. Supine to sit with Supervision  2. Rolling to Left and Right with Supervision  3. Sit to stand transfer with Supervision  4. Bed to chair transfer with Supervision   5. Gait  x 100 feet with Supervision with or without AD   6. Lower extremity exercise program x10 reps per handout, with supervision                     Time Tracking:     PT Received On: 07/15/22  PT Start Time: 1115     PT Stop Time: 1145  PT Total Time (min): 30 min     Billable Minutes: Therapeutic Exercise 30    Treatment Type: Treatment  PT/PTA: PT     PTA Visit Number: 0     07/15/2022

## 2022-07-16 VITALS
HEIGHT: 70 IN | DIASTOLIC BLOOD PRESSURE: 86 MMHG | WEIGHT: 172 LBS | TEMPERATURE: 98 F | SYSTOLIC BLOOD PRESSURE: 166 MMHG | BODY MASS INDEX: 24.62 KG/M2 | RESPIRATION RATE: 17 BRPM | HEART RATE: 75 BPM | OXYGEN SATURATION: 96 %

## 2022-07-16 LAB
ALBUMIN SERPL BCP-MCNC: 2.5 G/DL (ref 3.5–5.2)
ALP SERPL-CCNC: 78 U/L (ref 55–135)
ALT SERPL W/O P-5'-P-CCNC: 10 U/L (ref 10–44)
ANION GAP SERPL CALC-SCNC: 8 MMOL/L (ref 8–16)
AST SERPL-CCNC: 18 U/L (ref 10–40)
BASOPHILS # BLD AUTO: 0.01 K/UL (ref 0–0.2)
BASOPHILS NFR BLD: 0.2 % (ref 0–1.9)
BILIRUB SERPL-MCNC: 1.3 MG/DL (ref 0.1–1)
BUN SERPL-MCNC: 10 MG/DL (ref 8–23)
CALCIUM SERPL-MCNC: 8.6 MG/DL (ref 8.7–10.5)
CHLORIDE SERPL-SCNC: 110 MMOL/L (ref 95–110)
CO2 SERPL-SCNC: 23 MMOL/L (ref 23–29)
CREAT SERPL-MCNC: 0.8 MG/DL (ref 0.5–1.4)
DIFFERENTIAL METHOD: ABNORMAL
EOSINOPHIL # BLD AUTO: 0.2 K/UL (ref 0–0.5)
EOSINOPHIL NFR BLD: 3.1 % (ref 0–8)
ERYTHROCYTE [DISTWIDTH] IN BLOOD BY AUTOMATED COUNT: 19.8 % (ref 11.5–14.5)
EST. GFR  (AFRICAN AMERICAN): >60 ML/MIN/1.73 M^2
EST. GFR  (NON AFRICAN AMERICAN): >60 ML/MIN/1.73 M^2
GLUCOSE SERPL-MCNC: 119 MG/DL (ref 70–110)
HCT VFR BLD AUTO: 29 % (ref 40–54)
HGB BLD-MCNC: 8.4 G/DL (ref 14–18)
IMM GRANULOCYTES # BLD AUTO: 0.03 K/UL (ref 0–0.04)
IMM GRANULOCYTES NFR BLD AUTO: 0.5 % (ref 0–0.5)
LYMPHOCYTES # BLD AUTO: 0.6 K/UL (ref 1–4.8)
LYMPHOCYTES NFR BLD: 10.4 % (ref 18–48)
MCH RBC QN AUTO: 23.4 PG (ref 27–31)
MCHC RBC AUTO-ENTMCNC: 29 G/DL (ref 32–36)
MCV RBC AUTO: 81 FL (ref 82–98)
MONOCYTES # BLD AUTO: 0.3 K/UL (ref 0.3–1)
MONOCYTES NFR BLD: 5.1 % (ref 4–15)
NEUTROPHILS # BLD AUTO: 4.9 K/UL (ref 1.8–7.7)
NEUTROPHILS NFR BLD: 80.7 % (ref 38–73)
NRBC BLD-RTO: 0 /100 WBC
PLATELET # BLD AUTO: 302 K/UL (ref 150–450)
PMV BLD AUTO: 9.5 FL (ref 9.2–12.9)
POTASSIUM SERPL-SCNC: 3.8 MMOL/L (ref 3.5–5.1)
PROT SERPL-MCNC: 5.9 G/DL (ref 6–8.4)
RBC # BLD AUTO: 3.59 M/UL (ref 4.6–6.2)
SODIUM SERPL-SCNC: 141 MMOL/L (ref 136–145)
WBC # BLD AUTO: 6.08 K/UL (ref 3.9–12.7)

## 2022-07-16 PROCEDURE — 36415 COLL VENOUS BLD VENIPUNCTURE: CPT | Performed by: STUDENT IN AN ORGANIZED HEALTH CARE EDUCATION/TRAINING PROGRAM

## 2022-07-16 PROCEDURE — 85025 COMPLETE CBC W/AUTO DIFF WBC: CPT | Performed by: STUDENT IN AN ORGANIZED HEALTH CARE EDUCATION/TRAINING PROGRAM

## 2022-07-16 PROCEDURE — 25000003 PHARM REV CODE 250: Performed by: STUDENT IN AN ORGANIZED HEALTH CARE EDUCATION/TRAINING PROGRAM

## 2022-07-16 PROCEDURE — 80053 COMPREHEN METABOLIC PANEL: CPT | Performed by: STUDENT IN AN ORGANIZED HEALTH CARE EDUCATION/TRAINING PROGRAM

## 2022-07-16 RX ORDER — ALVIMOPAN 12 MG/1
12 CAPSULE ORAL 2 TIMES DAILY
Status: DISCONTINUED | OUTPATIENT
Start: 2022-07-16 | End: 2022-07-16 | Stop reason: HOSPADM

## 2022-07-16 RX ORDER — HYDROCODONE BITARTRATE AND ACETAMINOPHEN 5; 325 MG/1; MG/1
1 TABLET ORAL EVERY 6 HOURS PRN
Qty: 20 TABLET | Refills: 0 | Status: SHIPPED | OUTPATIENT
Start: 2022-07-16 | End: 2022-07-20 | Stop reason: SDUPTHER

## 2022-07-16 RX ORDER — PANTOPRAZOLE SODIUM 40 MG/1
40 TABLET, DELAYED RELEASE ORAL DAILY
Qty: 30 TABLET | Refills: 0 | Status: SHIPPED | OUTPATIENT
Start: 2022-07-16 | End: 2022-12-22

## 2022-07-16 RX ADMIN — ACETAMINOPHEN 1000 MG: 500 TABLET, FILM COATED ORAL at 06:07

## 2022-07-16 NOTE — HOSPITAL COURSE
Please see the preoperative H&P and other available documentation for full details related to history prior to this admission.  Briefly, Rayo Duran Sr. is a 86 y.o. male who was admitted following scheduled elective surgery for Malignant neoplasm of body of stomach. They underwent the following procedures: GASTRECTOMY (N/A)  with Lexx-en-Y reconstruction     Following a complete preoperative discussion of the risks and benefits of surgery with signed informed consent, the patient was taken to the operating room on 7/12/2022 and underwent the above stated procedures. The patient tolerated surgery well and there were no complications. Please see the operative report for full intraoperative findings and details. Postoperatively, the patient did well and was transferred from the PACU to the floor in stable condition where they had a stable and uncomplicated hospital course. Labs and vital signs remained stable and appropriate throughout course. Diet was advanced as tolerated and the patient's pain was controlled on oral pain medications without problem. Ambulating without issue. Voiding without issue with adequate urine output. Passing gas and stool. Incision site is clean, dry, and intact.    Currently, the patient is doing well at 4 Days Post-Op and is stable and appropriate for discharge home at this time. Patient will follow up in clinic with Dr. Montesinos in 1 weeks.    Physical Exam  Vitals and nursing note reviewed.   Constitutional:       General: He is not in acute distress.     Appearance: He is well-developed. He is not diaphoretic.      Comments: Resting comfortably   Cardiovascular:      Rate and Rhythm: Normal rate and regular rhythm.   Pulmonary:      Effort: Pulmonary effort is normal. No respiratory distress.   Abdominal:      Comments: Incisions with staples in place c/d/I.  Soft, non-distended, appropriately tender   Musculoskeletal:         General: Normal range of motion.   Skin:     General: Skin  is warm and dry.   Neurological:      General: No focal deficit present.      Cranial Nerves: No cranial nerve deficit.   Psychiatric:         Mood and Affect: Mood normal.         Behavior: Behavior normal.

## 2022-07-16 NOTE — DISCHARGE SUMMARY
[facility] Surg  General Surgery  Discharge Summary      Patient Name: Rayo Duran Sr.  MRN: [id number]  Admission Date: 7/12/2022  Hospital Length of Stay: 4 days  Discharge Date and Time:  07/16/2022 9:00 AM  Attending Physician: Jordan Montesinos MD   Discharging Provider: Dc Duarte MD  Primary Care Provider: Caroline Florez MD    HPI:   No notes on file    Procedure(s) (LRB):  GASTRECTOMY (N/A)      Indwelling Lines/Drains at time of discharge:   Lines/Drains/Airways     None               Hospital Course: Please see the preoperative H&P and other available documentation for full details related to history prior to this admission.  Briefly, Rayo Duran Sr. is a 86 y.o. male who was admitted following scheduled elective surgery for Malignant neoplasm of body of stomach. They underwent the following procedures: GASTRECTOMY (N/A)  with Lexx-en-Y reconstruction     Following a complete preoperative discussion of the risks and benefits of surgery with signed informed consent, the patient was taken to the operating room on 7/12/2022 and underwent the above stated procedures. The patient tolerated surgery well and there were no complications. Please see the operative report for full intraoperative findings and details. Postoperatively, the patient did well and was transferred from the PACU to the floor in stable condition where they had a stable and uncomplicated hospital course. Labs and vital signs remained stable and appropriate throughout course. Diet was advanced as tolerated and the patient's pain was controlled on oral pain medications without problem. Ambulating without issue. Voiding without issue with adequate urine output. Passing gas and stool. Incision site is clean, dry, and intact.    Currently, the patient is doing well at 4 Days Post-Op and is stable and appropriate for discharge home at this time. Patient will follow up in clinic with Dr. Montesinos in 1 weeks.    Physical Exam  Vitals and  nursing note reviewed.   Constitutional:       General: He is not in acute distress.     Appearance: He is well-developed. He is not diaphoretic.      Comments: Resting comfortably   Cardiovascular:      Rate and Rhythm: Normal rate and regular rhythm.   Pulmonary:      Effort: Pulmonary effort is normal. No respiratory distress.   Abdominal:      Comments: Incisions with staples in place c/d/I.  Soft, non-distended, appropriately tender   Musculoskeletal:         General: Normal range of motion.   Skin:     General: Skin is warm and dry.   Neurological:      General: No focal deficit present.      Cranial Nerves: No cranial nerve deficit.   Psychiatric:         Mood and Affect: Mood normal.         Behavior: Behavior normal.          Goals of Care Treatment Preferences:  Code Status: Full Code      Consults:     Significant Diagnostic Studies: Labs:   CMP   Recent Labs   Lab 07/15/22  1208 07/16/22  0633    141   K 3.8 3.8    110   CO2 22* 23   GLU 85 119*   BUN 13 10   CREATININE 0.8 0.8   CALCIUM 8.1* 8.6*   PROT 5.5* 5.9*   ALBUMIN 2.4* 2.5*   BILITOT 2.4* 1.3*   ALKPHOS 72 78   AST 19 18   ALT 9* 10   ANIONGAP 9 8   ESTGFRAFRICA >60 >60   EGFRNONAA >60 >60    and CBC   Recent Labs   Lab 07/15/22  1208 07/16/22  0633   WBC 6.46  6.46 6.08   HGB 8.1*  8.1* 8.4*   HCT 27.1*  27.1* 29.0*     238 302       Pending Diagnostic Studies:     Procedure Component Value Units Date/Time    Specimen to Pathology, Surgery General Surgery [[id number]] Collected: 07/12/22 1455    Order Status: Sent Lab Status: In process Updated: 07/13/22 1039    Specimen: Tissue         Final Active Diagnoses:    Diagnosis Date Noted POA    PRINCIPAL PROBLEM:  Malignant neoplasm of body of stomach [C16.2] 07/12/2022 Yes      Problems Resolved During this Admission:      Discharged Condition: stable    Disposition: Home or Self Care    Follow Up:    Patient Instructions:      Diet Adult Regular     Lifting restrictions      No driving until:   Order Comments: No driving until off narcotics and able to move arms freely     Notify your health care provider if you experience any of the following:  temperature >100.4     Notify your health care provider if you experience any of the following:  persistent nausea and vomiting or diarrhea     Notify your health care provider if you experience any of the following:  severe uncontrolled pain     Notify your health care provider if you experience any of the following:  redness, tenderness, or signs of infection (pain, swelling, redness, odor or green/yellow discharge around incision site)     Notify your health care provider if you experience any of the following:  difficulty breathing or increased cough     Notify your health care provider if you experience any of the following:  severe persistent headache     Notify your health care provider if you experience any of the following:  worsening rash     Notify your health care provider if you experience any of the following:  persistent dizziness, light-headedness, or visual disturbances     Notify your health care provider if you experience any of the following:  increased confusion or weakness     Medications:  Reconciled Home Medications:      Medication List      START taking these medications    HYDROcodone-acetaminophen 5-325 mg per tablet  Commonly known as: NORCO  Take 1 tablet by mouth every 6 (six) hours as needed for Pain.        CHANGE how you take these medications    * pantoprazole 40 MG tablet  Commonly known as: PROTONIX  Take 1 tablet (40 mg total) by mouth 2 (two) times daily.  What changed: Another medication with the same name was added. Make sure you understand how and when to take each.     * pantoprazole 40 MG tablet  Commonly known as: PROTONIX  Take 1 tablet (40 mg total) by mouth once daily.  What changed: You were already taking a medication with the same name, and this prescription was added. Make sure you  understand how and when to take each.         * This list has 2 medication(s) that are the same as other medications prescribed for you. Read the directions carefully, and ask your doctor or other care provider to review them with you.            CONTINUE taking these medications    traZODone 50 MG tablet  Commonly known as: DESYREL  Take 1 tablet (50 mg total) by mouth every evening.        STOP taking these medications    rivaroxaban 10 mg Tab  Commonly known as: XARELTO          Time spent on the discharge of patient: 15 minutes    Dc Duarte MD  General Surgery  [facility]

## 2022-07-16 NOTE — PLAN OF CARE
WRITTEN HEALTHCARE DISCHARGE INFORMATION      Things that YOU are responsible for to Manage Your Care At Home:  1. Getting your prescriptions filled.  2. Taking you medications as directed. DO NOT MISS ANY DOSES!  3. Going to your follow-up doctor appointments. This is important because it allows the doctor to monitor your progress and to determine if any changes need to be made to your treatment plan.     If you are unable to make your follow up appointments, please call the number listed and reschedule this appointment.      After discharge, if you need assistance, you can call [facility] On Call Nurse Care Line for 24/7 assistance at [phone]     If you are experience any signs or symptoms, Call your doctor or Call 911 and come to your nearest Emergency Room.     Thank you for choosing [facility] and allowing us to care for you.        You should receive a call from [facility] Discharge Department within 48-72 hours to help manage your care after discharge. Please try to make sure that you answer your phone for this important phone call.       Follow-up Information       Caroline Florez MD Follow up.    Specialty: Internal Medicine  Why: Please call office on Monday to arrange a, HOSPITAL DISCHARGE FOLLOW UP VISIT, in 1 week  Contact information:  [address] [place] [id number] [phone]               [facility] Follow up.    Why: [facility] to resume, please call for any questions relatng to home health services  Contact information:  [address] [place] [phone]

## 2022-07-16 NOTE — NURSING
"Patient currently using ant-racial language as evidenced by this nurse attempted to give the patient his am meds.  Patient refused his meds.He states the following:"  We are about to leave.  You know he his a Jap"..  This nurse states asked im sorry what did you say.  He repeated the above phrase.  This patient further stated he just wanted to leave    "

## 2022-07-16 NOTE — DISCHARGE INSTRUCTIONS
WRITTEN HEALTHCARE DISCHARGE INFORMATION      Things that YOU are responsible for to Manage Your Care At Home:  1. Getting your prescriptions filled.  2. Taking you medications as directed. DO NOT MISS ANY DOSES!  3. Going to your follow-up doctor appointments. This is important because it allows the doctor to monitor your progress and to determine if any changes need to be made to your treatment plan.     If you are unable to make your follow up appointments, please call the number listed and reschedule this appointment.      After discharge, if you need assistance, you can call [facility] On Call Nurse Care Line for 24/7 assistance at [phone]     If you are experience any signs or symptoms, Call your doctor or Call 911 and come to your nearest Emergency Room.     Thank you for choosing [facility] and allowing us to care for you.        You should receive a call from [facility] Discharge Department within 48-72 hours to help manage your care after discharge. Please try to make sure that you answer your phone for this important phone call.       Follow-up Information       Caroline Florez MD Follow up.    Specialty: Internal Medicine  Why: Please call office on Monday to arrange a, HOSPITAL DISCHARGE FOLLOW UP VISIT, in 1 week  Contact information:  [address] [place] [id number] [phone]               [facility] Follow up.    Why: [facility] to resume, please call for any questions relatng to home health services  Contact information:  [address] [place] [phone]

## 2022-07-16 NOTE — NURSING
Received report from off going nurse> Pt received lying supine in bed AAO X 2  Resp even and unlabored. Sitter and family member at the bed side. Pt with no c/o pain. SL noted to the pts RFA. NAD noted.

## 2022-07-16 NOTE — PLAN OF CARE
[facility] - Med Surg  Discharge Final Note    Primary Care Provider: Caroline Florez MD    Expected Discharge Date: 7/16/2022     All Case Management (CM) needs have been addressed; Nurse Patience informed that patient is cleared from CM standpoint      Final Discharge Note (most recent)     Final Note - 07/16/22 1105        Final Note    Assessment Type Final Discharge Note     Anticipated Discharge Disposition Home-Health Care [facility]     What phone number can be called within the next 1-3 days to see how you are doing after discharge? [phone]     Hospital Resources/Appts/Education Provided Provided patient/caregiver with written discharge plan information;Appointments scheduled and added to AVS;Post-Acute resouces added to AVS        Post-Acute Status    Post-Acute Authorization Home Health   [facility]    Home Health Status Set-up Complete/Auth obtained     Discharge Delays None known at this time                 Important Message from Medicare  Important Message from Medicare regarding Discharge Appeal Rights: Given to patient/caregiver, Explained to patient/caregiver, Signed/date by patient/caregiver     Date IMM was signed: 07/14/22  Time IMM was signed: 1428    Contact Info     Caroline Florez MD   Specialty: Internal Medicine   Relationship: PCP - General    [address] [place] [id number]   Phone: [phone]       Next Steps: Follow up    Instructions: Please call office on Monday to arrange a, HOSPITAL DISCHARGE FOLLOW UP VISIT, in 1 week    [address] [phone]       Next Steps: Follow up    Instructions: [facility] to resume, please call for any questions relatng to home health services

## 2022-07-18 ENCOUNTER — PATIENT OUTREACH (OUTPATIENT)
Dept: ADMINISTRATIVE | Facility: CLINIC | Age: 87
End: 2022-07-18
Payer: MEDICARE

## 2022-07-18 ENCOUNTER — TELEPHONE (OUTPATIENT)
Dept: OTOLARYNGOLOGY | Facility: CLINIC | Age: 87
End: 2022-07-18
Payer: MEDICARE

## 2022-07-18 NOTE — PROGRESS NOTES
C3 nurse spoke with Rayo Duran ( wife)   for a TCC post hospital discharge follow up call. The patient does not have a scheduled HOSFU appointment with Caroline Florez MD  within 7 days post hospital discharge date 07/16/2022 C3 nurse was unable to schedule HOSFU appointment in [facility].    Non [facility] PCP/  Out of NP area

## 2022-07-18 NOTE — TELEPHONE ENCOUNTER
----- Message from Taylor Vega sent at 7/18/2022 12:31 PM CDT -----  Regarding: HH  .Type: Patient Call Back    Who called: Clara     What is the request in detail: no orders to remove dressing. Verify if needed to remove. Also patient states he has been vomiting since yesterday Please call     Can the clinic reply by [facility]? No     Would the patient rather a call back or a response via [facility]? Call     Best call back number: [phone]

## 2022-07-20 ENCOUNTER — OFFICE VISIT (OUTPATIENT)
Dept: SURGERY | Facility: CLINIC | Age: 87
End: 2022-07-20
Payer: MEDICARE

## 2022-07-20 VITALS — WEIGHT: 163.56 LBS | BODY MASS INDEX: 23.42 KG/M2 | HEIGHT: 70 IN

## 2022-07-20 DIAGNOSIS — C16.2 MALIGNANT NEOPLASM OF BODY OF STOMACH: Primary | ICD-10-CM

## 2022-07-20 PROCEDURE — 99024 PR POST-OP FOLLOW-UP VISIT: ICD-10-PCS | Mod: S$GLB,POP,, | Performed by: SURGERY

## 2022-07-20 PROCEDURE — 99024 POSTOP FOLLOW-UP VISIT: CPT | Mod: S$GLB,POP,, | Performed by: SURGERY

## 2022-07-20 RX ORDER — HYDROCODONE BITARTRATE AND ACETAMINOPHEN 5; 325 MG/1; MG/1
1 TABLET ORAL EVERY 6 HOURS PRN
Qty: 30 TABLET | Refills: 0 | Status: SHIPPED | OUTPATIENT
Start: 2022-07-20 | End: 2022-12-22

## 2022-07-20 NOTE — PROGRESS NOTES
Subjective:       Patient ID: Rayo Duran Sr. is a 86 y.o. male.    Chief Complaint: Post-op Evaluation    HPI 87 yo male with gastric cancer s/p gastrectomy without complaints  Review of Systems   Constitutional: Negative.    HENT: Negative.    Eyes: Negative.    Respiratory: Negative.    Cardiovascular: Negative.    Gastrointestinal: Negative.    Endocrine: Negative.    Musculoskeletal: Negative.    Integumentary:  Negative.   Allergic/Immunologic: Negative.    Neurological: Negative.    Hematological: Negative.    Psychiatric/Behavioral: Negative.    All other systems reviewed and are negative.        Objective:      Physical Exam  Vitals reviewed.   Constitutional:       Appearance: He is well-developed.   HENT:      Head: Normocephalic and atraumatic.      Right Ear: External ear normal.      Left Ear: External ear normal.      Nose: Nose normal.   Eyes:      Conjunctiva/sclera: Conjunctivae normal.      Pupils: Pupils are equal, round, and reactive to light.   Cardiovascular:      Rate and Rhythm: Normal rate and regular rhythm.      Heart sounds: Normal heart sounds.   Pulmonary:      Effort: Pulmonary effort is normal.      Breath sounds: Normal breath sounds.   Abdominal:      General: Bowel sounds are normal.      Palpations: Abdomen is soft.       Musculoskeletal:         General: Normal range of motion.      Cervical back: Normal range of motion and neck supple.   Skin:     General: Skin is warm and dry.   Neurological:      Mental Status: He is alert and oriented to person, place, and time.      Deep Tendon Reflexes: Reflexes are normal and symmetric.   Psychiatric:         Behavior: Behavior normal.         Thought Content: Thought content normal.         Assessment:       Problem List Items Addressed This Visit     Malignant neoplasm of body of stomach - Primary        path pending  Plan:       Follow-up a 86-year-old male postop gastrectomy discuss pathology findings and will I will see him back in  2 weeks for more suture removal or staple removal he will see oncology within the next week

## 2022-07-22 LAB
FINAL PATHOLOGIC DIAGNOSIS: NORMAL
FROZEN SECTION DIAGNOSIS: NORMAL
FROZEN SECTION FOOTNOTE: NORMAL
GROSS: NORMAL
Lab: NORMAL

## 2022-07-25 ENCOUNTER — TUMOR BOARD CONFERENCE (OUTPATIENT)
Dept: SURGERY | Facility: CLINIC | Age: 87
End: 2022-07-25

## 2022-07-25 NOTE — PHYSICIAN QUERY
PT Name: Rayo Duran .  MR #: [id number]    DOCUMENTATION CLARIFICATION     CDS: Leah JOE RN  Contact information: [email or web address]    This form is a permanent document in the medical record.     Query Date: July 25, 2022    By submitting this query, we are merely seeking further clarification of documentation.  Please utilize your independent clinical judgment when addressing the question(s) below.    The medical record contains the following:  Pathology Findings Location in Medical Record   Lymphovascular invasion present   Metastatic carcinoma identified in eight out of 13 lymph nodes (8/13), supported by AE1/AE3 immunostains with adequate controls.    Regional lymph nodes   Regional lymph nodes status   Number of lymph nodes with tumor-8   Number of lymph nodes examined-13   Pathologic stage classification   pT category-pT4a:  Tumor invades the serosa (visceral peritoneum)   pN category-pN3a:  Metastasis in 7-15 regional lymph nodes Surgical Pathology 7/12/2022          Surgical Pathology 7/12/2022       Please clarify:  [x  ] Pathology findings noted above are ruled in/confirmed as diagnoses   [  ] Pathology findings noted above are not confirmed as diagnoses   [  ] Pathology findings noted above are incidental   [  ] Other diagnosis (please specify): ___________   [  ] Clinically Undetermined       Please document in your progress notes daily for the duration of treatment until resolved and include in your discharge summary.    Form No. [id number]

## 2022-07-25 NOTE — PROGRESS NOTES
[facility] UGI MULTIDISCIPLINARY TUMOR BOARD  PATIENT REVIEW FORM   ____________________________________________________________    CLINIC #:  [id number]  DATE: 7/25/2022    DIAGNOSIS: Gastric CA    PRESENTER: Yamel    PATIENT SUMMARY: This 85 y/o gentleman from [place] presented with symtompatic GIB, with H/H in single digits. Past gastrectomy with B reconstruction for possible past gastric ulcer.    He underwent endoscope in [place] 2022 per Dr. Escalera.  CT scan reviewed- no evidence of mets or suspicious lymph nodes.   Underwent s/p subtotal gastrectomy with conversion to Lexx-en-Y reconstruction per Dr. Montesinos on 7/12/22.   Reviewed pathology - proximal and distal margin negative but free radial margin positive but difficult to assess given prior surgery; tumor extends through stomach wall serosa into bowel, 8 out of 13 lymph nodes positive for malignancy, poorly differentiated adenocarcinoma with NET features; MMR intact  pT4a  Seeing local med onc in [place].     BOARD RECOMMENDATIONS:   Refer to med onc to discuss systemic, adj chemotherapy  Refer to rad onc to discuss adj radiation    CONSULT NEEDED:     [] Surgery    [x] Hem/Onc    [x] Rad/Onc    [] Dietary                 [] Social Service    [] Psychology       [] AES  [] Radiology    Pathologic Stage: Tumor 4a  Node(s) 3a Metastasis 0  8 nodes out of 13 nodes were positive for malignancy    GROUP STAGE:  [] O    [] 1A    [] IB    [] IIA    [] IIB     [x] III     []IV  [] Local recurrence     [] Regional recurrence     [] Distant recurrence   Metastatic site(s): none         [x] Agnieszka'l Treatment Guidelines reviewed and care planned is consistent with guidelines.         (i.e., NCCN, NCI, PD, ACO, AUA, etc.)    PRESENTATION AT CANCER CONFERENCE:         [] Prospective    [x] Retrospective     [] Follow-Up

## 2022-07-26 ENCOUNTER — OFFICE VISIT (OUTPATIENT)
Dept: HEMATOLOGY/ONCOLOGY | Facility: CLINIC | Age: 87
End: 2022-07-26
Payer: MEDICARE

## 2022-07-26 VITALS
DIASTOLIC BLOOD PRESSURE: 77 MMHG | HEIGHT: 70 IN | SYSTOLIC BLOOD PRESSURE: 136 MMHG | WEIGHT: 161.81 LBS | RESPIRATION RATE: 18 BRPM | OXYGEN SATURATION: 98 % | HEART RATE: 78 BPM | BODY MASS INDEX: 23.17 KG/M2

## 2022-07-26 DIAGNOSIS — C26.9 GI CANCER: ICD-10-CM

## 2022-07-26 DIAGNOSIS — C16.2 MALIGNANT NEOPLASM OF BODY OF STOMACH: Primary | ICD-10-CM

## 2022-07-26 PROCEDURE — 99205 PR OFFICE/OUTPT VISIT, NEW, LEVL V, 60-74 MIN: ICD-10-PCS | Mod: ,,, | Performed by: STUDENT IN AN ORGANIZED HEALTH CARE EDUCATION/TRAINING PROGRAM

## 2022-07-26 PROCEDURE — 99205 OFFICE O/P NEW HI 60 MIN: CPT | Mod: ,,, | Performed by: STUDENT IN AN ORGANIZED HEALTH CARE EDUCATION/TRAINING PROGRAM

## 2022-07-26 RX ORDER — ONDANSETRON 4 MG/1
4 TABLET, ORALLY DISINTEGRATING ORAL EVERY 6 HOURS PRN
Qty: 30 TABLET | Refills: 3 | Status: SHIPPED | OUTPATIENT
Start: 2022-07-26 | End: 2022-08-25

## 2022-07-26 NOTE — PROGRESS NOTES
Referring provider: Dr. Yamel HU  Reason for consult:  Gastric adenocarcinoma    Patient is a 86-year-old with no significant past medical history except for gastric ulcer status post surgery 40-50 years back and cholecystectomy who presented to clinic to establish care for new diagnosis of gastric adenocarcinoma.  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  Patient underwent CT abdomen pelvis with contrast without evidence of metastatic disease.  He was scheduled for a partial gastrectomy which he had on 07/12/2022, pathology from which revealed adenocarcinoma with neuroendocrine features, moderate to poorly differentiated, with positive radial margin, with invasion into the serosa and direct invasion the small intestine as well as 8 positive lymph nodes.  Patient has healed well from surgery and is back to his baseline in terms of his functional status.  He reports intermittent symptoms of nausea and vomiting however denies any change in his bowel habits.  He denies any further episodes of hemoptysis hematemesis, hematochezia or melena.  Patient reports 30 lb weight loss since January this year.    He lives with his wife, has a son and a daughter who live close by.  Patient is hard of hearing.  He is independent with his ADLs and most office IADLs.  Patient's daughter tells me he cut grass day before yesterday.  Has a family history of lung cancer in his father and uterine cancer in his sister.  He is a former smoker and currently chews tobacco.  He used to work in an oil field currently retired.    Pathology    07/12/2022:   Partial gastrectomy:   Adenocarcinoma with neuroendocrine features, moderate to poorly   differentiated (4.5 cm) with invasion of the serosa and direct extension into   the small intestine   Radial  margin involved by invasive carcinoma   Proximal and distal surgical margins negative for dysplasia or malignancy   Lymphovascular invasion present   Metastatic carcinoma identified in eight out of 13 lymph nodes (8/13),   supported by AE1/AE3 immunostains with adequate controls.   The largest metastatic deposit measures 3 mm in greatest linear dimension   No definitive extranodal extension   PATHOLOGIC TNM STAGING: [id number]   HER2 1+, negative.  Positive for synaptophysin      06/16/2022  Fragments of gastric non-oxyntic mucosa (submitted as stomach ulcerated mass   biopsy):   -Infiltrating, poorly differentiated carcinoma with neuroendocrine features   -Morphologic and immunohistochemical features are not specifically suggestive   of either a primary gastric adenocarcinoma or a neuroendocrine carcinoma,   although neither of those two possibilities can be excluded.  The possibility   of a carcinoma from another site extending or metastasizing to the stomach   also cannot be excluded.  Positive for AE1/AE3, Negative for CK 7, CK 20, CDX 2.  CEA focally positive within benign glands.  HER2 1+, weak positivity for synaptophysin but chromogranin is negative.    Full interpretation requires correlation with   clinical, radiographic, and other findings.   -MMR by immunohistochemical stains shows normal, retained MLH1, MSH2, MSH6,   and PMS1     Past Medical History:   Diagnosis Date    Cancer     gastric    Dizziness     Hypertension     Insomnia     Stomach ulcer     Tumor     removed from stomach       Past Surgical History:   Procedure Laterality Date    APPENDECTOMY      CHOLECYSTECTOMY      ESOPHAGOGASTRODUODENOSCOPY N/A 5/31/2022    Procedure: EGD (ESOPHAGOGASTRODUODENOSCOPY);  Surgeon: Mario Escalera MD;  Location: [facility] ENDO;  Service: Endoscopy;  Laterality: N/A;    GASTRECTOMY      GASTRECTOMY N/A 7/12/2022    Procedure: GASTRECTOMY;  Surgeon: Jordan Montesinos MD;  Location: [facility] OR;  Service:  General;  Laterality: N/A;  RN PREOP 6/29/2022   T/S DAY OF SURGERY (LIVES 110 MILES AWAY)  HAS PCP CLEAR.    KNEE SURGERY      PARTIAL GASTRECTOMY      SHOULDER SURGERY Right        Family History   Problem Relation Age of Onset    No Known Problems Mother     No Known Problems Father     Hypertension Sister     Uterine cancer Sister     No Known Problems Brother     No Known Problems Maternal Grandmother     No Known Problems Maternal Grandfather     No Known Problems Paternal Grandmother     No Known Problems Paternal Grandfather        Social History     Socioeconomic History    Marital status:    Tobacco Use    Smoking status: Never Smoker    Smokeless tobacco: Former User     Types: Chew   Substance and Sexual Activity    Alcohol use: Not Currently    Drug use: Never    Sexual activity: Not Currently       Current Outpatient Medications   Medication Sig Dispense Refill    HYDROcodone-acetaminophen (NORCO) 5-325 mg per tablet Take 1 tablet by mouth every 6 (six) hours as needed for Pain. 30 tablet 0    pantoprazole (PROTONIX) 40 MG tablet Take 1 tablet (40 mg total) by mouth once daily. 30 tablet 0    traZODone (DESYREL) 50 MG tablet Take 1 tablet (50 mg total) by mouth every evening. 30 tablet 0    pantoprazole (PROTONIX) 40 MG tablet Take 1 tablet (40 mg total) by mouth 2 (two) times daily. 60 tablet 5     No current facility-administered medications for this visit.       Review of patient's allergies indicates:   Allergen Reactions    Ativan [lorazepam] Anxiety     Patient had paradoxical effect after ativan IV and was so agitated he had to be restrained and transferred to ICU for precedex.       Review of system  CONSTITUTIONAL: no fevers, no chills, + weight loss, no fatigue, no weakness  HEMATOLOGIC: no abnormal bleeding, no abnormal bruising, no drenching night sweats  ONCOLOGIC: no new masses or lumps  HEENT: no vision loss, no tinnitus or hearing loss, no nose bleeding, no  dysphagia, no odynophagia  CVS: no chest pain, no palpitations, no dyspnea on exertion  RESP: no shortness of breath, no hemoptysis, no cough  GI: + nausea, no vomiting, no diarrhea, no constipation, no melena, no hematochezia, no hematemesis, + abdominal pain, no increase in abdominal girth  : no dysuria, no hematuria, no hesitancy, no scrotal swelling, no discharge  INTEGUMENT: no rashes, no abnormal bruising, no nail pitting, no hyperpigmentation  NEURO: no falls, no memory loss, no paresthesias or dysesthesias, no urofecal incontinence or retention, no loss of strength on any extremity  MSK: no back pain, no new joint pain, no joint swelling  PSYCH: no suicidal or homicidal ideation, no depression, no insomnia, no anhedonia  ENDOCRINE: no heat or cold intolerance, no polyuria, no polydipsia    Physical Exam:    Vitals:    07/26/22 1117   BP: 136/77   Pulse: 78   Resp: 18       ECOG PS 1-2  GA: AAOx3, NAD, accompanied by his wife and daughter.  Bitemporal wasting  HEENT: NCAT, PERRLA, EOMI, Poor dentition, moist oral mucous membranes, hard of hearing  LYMPH: no cervical, axillary or supraclavicular adenopathy  CVS: s1s2 RRR, no M/R/G  RESP: CTA b/l, no crackles, no wheezes or rhonchi  ABD: soft, NT, ND, BS+, no hepatosplenomegaly, healing midline incision with staples in place without surrounding edema, erythema or discharge.  EXT: no deformities, no pedal edema  SKIN: no rashes, warm and dry  NEURO: normal mentation, strength 5/5 on all 4 extremities, no sensory deficits      Assessment plan    # Gastric adenocarcinoma, pT4 pN3aMx, poorly differentiated, SANJAY, HER2 negative,   -  Status post partial gastrectomy on 07/12/2022,  positive radial margins, 8/13 positive lymph nodes  - Advanced age, relatively fit ECOG 1-2  - Hard of hearing, limited insight into his disease.  - Discussed with patient, his wife and daughter the diagnosis, staging at this point, need for further workup to complete staging and further  treatment recommendations including concurrent chemo RT given positive margins.   - Patient would be a poor candidate for multi agent systemic chemotherapy in adjuvant setting given his advanced age.  - Will obtain a PET-CT to complete staging workup in 2 weeks to allow for healing after surgery  - Noted CT abdomen pelvis prior to surgery without evidence of metastatic disease or  Adenopathy.  - Referred to Radiation Oncology for recommendations regarding adjuvant concurrent chemo RT for positive margins  - Follow-up in clinic in 3 weeks to discuss above workup and further disease management.      Plan  - Zofran 4 mg q.6 hours p.r.n. for nausea/vomiting  - PET-CT in 2 weeks  - Radiation oncology referral for recommendations of adjuvant chemo RT  - Follow-up in 2 weeks to discuss above workup and further disease management      A total of  60 minutes were spent in review of records, interpretation of test, coordination of care, discussion and counseling with the patient.

## 2022-07-28 ENCOUNTER — TELEPHONE (OUTPATIENT)
Dept: SURGERY | Facility: CLINIC | Age: 87
End: 2022-07-28
Payer: MEDICARE

## 2022-07-28 NOTE — TELEPHONE ENCOUNTER
----- Message from Enzo Mcgrath sent at 7/28/2022 10:19 AM CDT -----  Type: Patient Call Back    [facility] [place]    What is the request in detail:Requesting a call back.    Can the clinic reply by [facility]?no    Would the patient rather a call back or a response via [facility]? call    Best call back number:[phone]

## 2022-08-01 ENCOUNTER — TELEPHONE (OUTPATIENT)
Dept: SURGERY | Facility: CLINIC | Age: 87
End: 2022-08-01
Payer: MEDICARE

## 2022-08-01 NOTE — TELEPHONE ENCOUNTER
----- Message from Dagmar Barnhart sent at 8/1/2022  2:58 PM CDT -----  Type: Patient Call Back    Who called: Yesenia with [facility]     What is the request in detail: Pt is requesting staples be removed. Please call.    Can the clinic reply by [facility]? no    Would the patient rather a call back or a response via [facility]? call    Best call back number:

## 2022-08-01 NOTE — TELEPHONE ENCOUNTER
Spoke with Tonie, with [facility], she  states patient has poor appetite, vomiting, incision redness,  no drainage/ warmth, or swelling.. Patient requesting nurse with [facility]  to remove staples,  so that he does not have to travel to [place].      Please advise.

## 2022-08-03 ENCOUNTER — OFFICE VISIT (OUTPATIENT)
Dept: SURGERY | Facility: CLINIC | Age: 87
End: 2022-08-03
Payer: MEDICARE

## 2022-08-03 VITALS — BODY MASS INDEX: 22.9 KG/M2 | DIASTOLIC BLOOD PRESSURE: 88 MMHG | SYSTOLIC BLOOD PRESSURE: 138 MMHG | WEIGHT: 159.63 LBS

## 2022-08-03 DIAGNOSIS — C16.2 MALIGNANT NEOPLASM OF BODY OF STOMACH: Primary | ICD-10-CM

## 2022-08-03 PROCEDURE — 99024 POSTOP FOLLOW-UP VISIT: CPT | Mod: S$GLB,POP,, | Performed by: SURGERY

## 2022-08-03 PROCEDURE — 99024 PR POST-OP FOLLOW-UP VISIT: ICD-10-PCS | Mod: S$GLB,POP,, | Performed by: SURGERY

## 2022-08-03 NOTE — PROGRESS NOTES
Subjective:       Patient ID: Rayo Duran Sr. is a 86 y.o. male.    Chief Complaint: Post-op Evaluation    HPI 86 male with gastric cancer s/p gastrectomy without complaints today  Review of Systems   Constitutional: Negative.    HENT: Negative.    Eyes: Negative.    Respiratory: Negative.    Cardiovascular: Negative.    Gastrointestinal: Negative.    Endocrine: Negative.    Musculoskeletal: Negative.    Integumentary:  Negative.   Allergic/Immunologic: Negative.    Neurological: Negative.    Hematological: Negative.    Psychiatric/Behavioral: Negative.    All other systems reviewed and are negative.        Objective:      Physical Exam  Vitals reviewed.   Constitutional:       Appearance: He is well-developed.   HENT:      Head: Normocephalic and atraumatic.      Right Ear: External ear normal.      Left Ear: External ear normal.      Nose: Nose normal.   Eyes:      Conjunctiva/sclera: Conjunctivae normal.      Pupils: Pupils are equal, round, and reactive to light.   Cardiovascular:      Rate and Rhythm: Normal rate and regular rhythm.      Heart sounds: Normal heart sounds.   Pulmonary:      Effort: Pulmonary effort is normal.      Breath sounds: Normal breath sounds.   Abdominal:      General: Bowel sounds are normal.      Palpations: Abdomen is soft.       Musculoskeletal:         General: Normal range of motion.      Cervical back: Normal range of motion and neck supple.   Skin:     General: Skin is warm and dry.   Neurological:      Mental Status: He is alert and oriented to person, place, and time.      Deep Tendon Reflexes: Reflexes are normal and symmetric.   Psychiatric:         Behavior: Behavior normal.         Thought Content: Thought content normal.         Assessment:       Problem List Items Addressed This Visit     Malignant neoplasm of body of stomach - Primary        doing well post op  Plan:        He is to see med and rad onc to discuss adjuvant treatment and I will see him back in 3 months

## 2022-08-16 ENCOUNTER — OFFICE VISIT (OUTPATIENT)
Dept: HEMATOLOGY/ONCOLOGY | Facility: CLINIC | Age: 87
End: 2022-08-16
Payer: MEDICARE

## 2022-08-16 VITALS
HEIGHT: 70 IN | SYSTOLIC BLOOD PRESSURE: 111 MMHG | BODY MASS INDEX: 22.81 KG/M2 | DIASTOLIC BLOOD PRESSURE: 82 MMHG | RESPIRATION RATE: 18 BRPM | HEART RATE: 83 BPM | WEIGHT: 159.31 LBS | OXYGEN SATURATION: 99 % | TEMPERATURE: 98 F

## 2022-08-16 DIAGNOSIS — C16.2 MALIGNANT NEOPLASM OF BODY OF STOMACH: Primary | ICD-10-CM

## 2022-08-16 DIAGNOSIS — Z51.11 ENCOUNTER FOR ANTINEOPLASTIC CHEMOTHERAPY: ICD-10-CM

## 2022-08-16 PROCEDURE — 99215 OFFICE O/P EST HI 40 MIN: CPT | Mod: ,,, | Performed by: STUDENT IN AN ORGANIZED HEALTH CARE EDUCATION/TRAINING PROGRAM

## 2022-08-16 PROCEDURE — 99215 PR OFFICE/OUTPT VISIT, EST, LEVL V, 40-54 MIN: ICD-10-PCS | Mod: ,,, | Performed by: STUDENT IN AN ORGANIZED HEALTH CARE EDUCATION/TRAINING PROGRAM

## 2022-08-16 RX ORDER — CAPECITABINE 150 MG/1
150 TABLET, FILM COATED ORAL 2 TIMES DAILY
Qty: 50 TABLET | Refills: 0 | Status: SHIPPED | OUTPATIENT
Start: 2022-08-16 | End: 2022-09-02 | Stop reason: SDUPTHER

## 2022-08-16 RX ORDER — CAPECITABINE 150 MG/1
150 TABLET, FILM COATED ORAL 2 TIMES DAILY
Qty: 50 TABLET | Refills: 0 | Status: SHIPPED | OUTPATIENT
Start: 2022-08-16 | End: 2022-08-16

## 2022-08-16 RX ORDER — CAPECITABINE 500 MG/1
1000 TABLET, FILM COATED ORAL 2 TIMES DAILY
Qty: 100 TABLET | Refills: 0 | Status: SHIPPED | OUTPATIENT
Start: 2022-08-16 | End: 2022-08-16

## 2022-08-16 RX ORDER — CAPECITABINE 500 MG/1
1000 TABLET, FILM COATED ORAL 2 TIMES DAILY
Qty: 100 TABLET | Refills: 0 | Status: SHIPPED | OUTPATIENT
Start: 2022-08-16 | End: 2022-09-06 | Stop reason: SDUPTHER

## 2022-08-16 NOTE — PROGRESS NOTES
Referring provider: Dr. Yamel HU  Reason for consult:  Gastric adenocarcinoma    Patient is a 86-year-old with no significant past medical history except for gastric ulcer status post surgery 40-50 years back and cholecystectomy who presented to clinic to establish care for new diagnosis of gastric adenocarcinoma.  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  Patient underwent CT abdomen pelvis with contrast without evidence of metastatic disease.  He was scheduled for a partial gastrectomy which he had on 07/12/2022, pathology from which revealed adenocarcinoma with neuroendocrine features, moderate to poorly differentiated, with positive radial margin, with invasion into the serosa and direct invasion the small intestine as well as 8 positive lymph nodes.  Patient has healed well from surgery and is back to his baseline in terms of his functional status.  He reports intermittent symptoms of nausea and vomiting however denies any change in his bowel habits.  He denies any further episodes of hemoptysis hematemesis, hematochezia or melena.  Patient reports 30 lb weight loss since January this year.    He lives with his wife, has a son and a daughter who live close by.  Patient is hard of hearing.  He is independent with his ADLs and most office IADLs.  Patient's daughter tells me he cut grass day before yesterday.  Has a family history of lung cancer in his father and uterine cancer in his sister.  He is a former smoker and currently chews tobacco.  He used to work in an oil field currently retired.    Today, 08/16/2022, patient reports symptoms of nausea however denies any vomiting.  He denies any weight loss since his last visit with us.  He reports a fair appetite.  He denies any new symptoms of pain, heartburn, GI blood  loss.  He denies any headaches, vision changes focal weakness.      Imaging   07/29/2022 : PET-CT-moderate diffuse uptake at the suture line in the stomach which is probably inflammatory in etiology, however continued surveillance is recommended.  No convincing PET evidence of metastatic disease.  Mild uptake in the anterior left 4th and 5th rib which is favored being on a posttraumatic PC is however continue monitoring is recommended.  No other suspicious uptake    Pathology    07/12/2022:   Partial gastrectomy:   Adenocarcinoma with neuroendocrine features, moderate to poorly   differentiated (4.5 cm) with invasion of the serosa and direct extension into   the small intestine   Radial margin involved by invasive carcinoma   Proximal and distal surgical margins negative for dysplasia or malignancy   Lymphovascular invasion present   Metastatic carcinoma identified in eight out of 13 lymph nodes (8/13),   supported by AE1/AE3 immunostains with adequate controls.   The largest metastatic deposit measures 3 mm in greatest linear dimension   No definitive extranodal extension   PATHOLOGIC TNM STAGING: [id number]   HER2 1+, negative.  Positive for synaptophysin      06/16/2022  Fragments of gastric non-oxyntic mucosa (submitted as stomach ulcerated mass   biopsy):   -Infiltrating, poorly differentiated carcinoma with neuroendocrine features   -Morphologic and immunohistochemical features are not specifically suggestive   of either a primary gastric adenocarcinoma or a neuroendocrine carcinoma,   although neither of those two possibilities can be excluded.  The possibility   of a carcinoma from another site extending or metastasizing to the stomach   also cannot be excluded.  Positive for AE1/AE3, Negative for CK 7, CK 20, CDX 2.  CEA focally positive within benign glands.  HER2 1+, weak positivity for synaptophysin but chromogranin is negative.    Full interpretation requires correlation with   clinical, radiographic, and  other findings.   -MMR by immunohistochemical stains shows normal, retained MLH1, MSH2, MSH6,   and PMS1     Past Medical History:   Diagnosis Date    Cancer     gastric    Dizziness     Hypertension     Insomnia     Stomach ulcer     Tumor     removed from stomach       Past Surgical History:   Procedure Laterality Date    APPENDECTOMY      CHOLECYSTECTOMY      ESOPHAGOGASTRODUODENOSCOPY N/A 5/31/2022    Procedure: EGD (ESOPHAGOGASTRODUODENOSCOPY);  Surgeon: Mario Escalera MD;  Location: [facility] ENDO;  Service: Endoscopy;  Laterality: N/A;    GASTRECTOMY      GASTRECTOMY N/A 7/12/2022    Procedure: GASTRECTOMY;  Surgeon: Jordan Montesinos MD;  Location: [facility] OR;  Service: General;  Laterality: N/A;  RN PREOP 6/29/2022   T/S DAY OF SURGERY (LIVES [place] MIL[address] AWAY)  HAS PCP CLEAR.    KNEE SURGERY      PARTIAL GASTRECTOMY      SHOULDER SURGERY Right        Family History   Problem Relation Age of Onset    No Known Problems Mother     No Known Problems Father     Hypertension Sister     Uterine cancer Sister     No Known Problems Brother     No Known Problems Maternal Grandmother     No Known Problems Maternal Grandfather     No Known Problems Paternal Grandmother     No Known Problems Paternal Grandfather        Social History     Socioeconomic History    Marital status:    Tobacco Use    Smoking status: Never Smoker    Smokeless tobacco: Former User     Types: Chew   Substance and Sexual Activity    Alcohol use: Not Currently    Drug use: Never    Sexual activity: Not Currently       Current Outpatient Medications   Medication Sig Dispense Refill    HYDROcodone-acetaminophen (NORCO) 5-325 mg per tablet Take 1 tablet by mouth every 6 (six) hours as needed for Pain. 30 tablet 0    ondansetron (ZOFRAN-ODT) 4 MG TbDL Take 1 tablet (4 mg total) by mouth every 6 (six) hours as needed. 30 tablet 3    pantoprazole (PROTONIX) 40 MG tablet Take 1 tablet (40 mg total) by mouth 2 (two) times  daily. 60 tablet 5    pantoprazole (PROTONIX) 40 MG tablet Take 1 tablet (40 mg total) by mouth once daily. 30 tablet 0    traZODone (DESYREL) 50 MG tablet Take 1 tablet by mouth in the evening 30 tablet 1    capecitabine (XELODA) 150 MG tablet Take 1 tablet (150 mg total) by mouth 2 (two) times daily with 1 other capecitabine prescription for 1,150 mg total. 50 tablet 0    capecitabine (XELODA) 500 MG Tab Take 2 tablets (1,000 mg total) by mouth 2 (two) times daily with 1 other capecitabine prescription for 1,150 mg total. 100 tablet 0     No current facility-administered medications for this visit.       Review of patient's allergies indicates:   Allergen Reactions    Ativan [lorazepam] Anxiety     Patient had paradoxical effect after ativan IV and was so agitated he had to be restrained and transferred to ICU for precedex.       Review of system  CONSTITUTIONAL: no fevers, no chills, + weight loss, no fatigue, no weakness  HEMATOLOGIC: no abnormal bleeding, no abnormal bruising, no drenching night sweats  ONCOLOGIC: no new masses or lumps  HEENT: no vision loss, no tinnitus or hearing loss, no nose bleeding, no dysphagia, no odynophagia  CVS: no chest pain, no palpitations, no dyspnea on exertion  RESP: no shortness of breath, no hemoptysis, no cough  GI: + nausea, no vomiting, no diarrhea, no constipation, no melena, no hematochezia, no hematemesis, + abdominal pain, no increase in abdominal girth  : no dysuria, no hematuria, no hesitancy, no scrotal swelling, no discharge  INTEGUMENT: no rashes, no abnormal bruising, no nail pitting, no hyperpigmentation  NEURO: no falls, no memory loss, no paresthesias or dysesthesias, no urofecal incontinence or retention, no loss of strength on any extremity  MSK: no back pain, no new joint pain, no joint swelling  PSYCH: no suicidal or homicidal ideation, no depression, no insomnia, no anhedonia  ENDOCRINE: no heat or cold intolerance, no polyuria, no  polydipsia    Physical Exam:    Vitals:    08/16/22 0956   BP: 111/82   Pulse: 83   Resp: 18   Temp: 97.9 °F (36.6 °C)       ECOG PS 1-2  GA: AAOx3, NAD, accompanied by his wife and daughter.  Bitemporal wasting  HEENT: NCAT, PERRLA, EOMI, Poor dentition, moist oral mucous membranes, hard of hearing  LYMPH: no cervical, axillary or supraclavicular adenopathy  CVS: s1s2 RRR, no M/R/G  RESP: CTA b/l, no crackles, no wheezes or rhonchi  ABD: soft, NT, ND, BS+, no hepatosplenomegaly, healing midline incision with staples in place without surrounding edema, erythema or discharge.  EXT: no deformities, no pedal edema  SKIN: no rashes, warm and dry  NEURO: normal mentation, strength 5/5 on all 4 extremities, no sensory deficits      Assessment plan    # Gastric adenocarcinoma, pT4 pN3aMx, poorly differentiated, SANJAY, HER2 negative,   -  Status post partial gastrectomy on 07/12/2022,  positive radial margins, 8/13 positive lymph nodes  - Advanced age, relatively fit ECOG 1-2  - Hard of hearing, limited insight into his disease.  - Discussed with patient, his wife and daughter the diagnosis, staging at this point, need for further workup to complete staging and further treatment recommendations including concurrent chemo RT given positive margins.   - Patient would be a poor candidate for multi agent systemic chemotherapy in adjuvant setting given his advanced age.  - PET-CT without evidence of distant metastatic disease  - noted recommendation from Radiation Oncology for adjuvant chemo RT.    Discussed the plan to initiate chemo RT with Xeloda, dose at 625 mg per m2 p.o. days 1-5 for maximum of 5 weeks with radiation.  - Follow-up in clinic in 3 weeks to discuss above workup and further disease management.      Plan  CBC, CMP, iron profile, vitamin B12, folic acid level today  Capecitabine 625 mg per m2 p.o. twice daily on days 1-5 for a maximum of 5 weeks.  Prescription sent to pharmacy for 1150 milligrams b.i.d. days  1-5  Schedule patient for chemo education   Follow-up in clinic in 3 weeks with repeat labs with me to assess treatment tolerance

## 2022-08-22 ENCOUNTER — OFFICE VISIT (OUTPATIENT)
Dept: HEMATOLOGY/ONCOLOGY | Facility: CLINIC | Age: 87
End: 2022-08-22
Payer: MEDICARE

## 2022-08-22 VITALS
OXYGEN SATURATION: 97 % | HEART RATE: 79 BPM | RESPIRATION RATE: 16 BRPM | BODY MASS INDEX: 22.94 KG/M2 | DIASTOLIC BLOOD PRESSURE: 77 MMHG | WEIGHT: 160.25 LBS | HEIGHT: 70 IN | TEMPERATURE: 98 F | SYSTOLIC BLOOD PRESSURE: 114 MMHG

## 2022-08-22 DIAGNOSIS — C16.2 MALIGNANT NEOPLASM OF BODY OF STOMACH: Primary | ICD-10-CM

## 2022-08-22 PROCEDURE — 99214 PR OFFICE/OUTPT VISIT, EST, LEVL IV, 30-39 MIN: ICD-10-PCS | Mod: ,,, | Performed by: NURSE PRACTITIONER

## 2022-08-22 PROCEDURE — 99214 OFFICE O/P EST MOD 30 MIN: CPT | Mod: ,,, | Performed by: NURSE PRACTITIONER

## 2022-08-22 RX ORDER — MAGNESIUM 200 MG
1000 TABLET ORAL DAILY
Qty: 30 TABLET | Refills: 4 | Status: SHIPPED | OUTPATIENT
Start: 2022-08-22 | End: 2022-12-22

## 2022-08-22 RX ORDER — FOLIC ACID 1 MG/1
1 TABLET ORAL DAILY
Qty: 30 TABLET | Refills: 3 | Status: SHIPPED | OUTPATIENT
Start: 2022-08-22 | End: 2022-12-22

## 2022-08-22 RX ORDER — PROMETHAZINE HYDROCHLORIDE 12.5 MG/1
12.5 TABLET ORAL EVERY 4 HOURS PRN
Qty: 30 TABLET | Refills: 1 | Status: SHIPPED | OUTPATIENT
Start: 2022-08-22 | End: 2022-12-22

## 2022-08-22 NOTE — PROGRESS NOTES
Referring provider: Dr. Yamel HU  Reason for consult:  Gastric adenocarcinoma    Patient is a 86-year-old with no significant past medical history except for gastric ulcer status post surgery 40-50 years back and cholecystectomy who presented to clinic to establish care for new diagnosis of gastric adenocarcinoma.  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  Patient underwent CT abdomen pelvis with contrast without evidence of metastatic disease.  He was scheduled for a partial gastrectomy which he had on 07/12/2022, pathology from which revealed adenocarcinoma with neuroendocrine features, moderate to poorly differentiated, with positive radial margin, with invasion into the serosa and direct invasion the small intestine as well as 8 positive lymph nodes.  Patient has healed well from surgery and is back to his baseline in terms of his functional status.  He reports intermittent symptoms of nausea and vomiting however denies any change in his bowel habits.  He denies any further episodes of hemoptysis hematemesis, hematochezia or melena.  Patient reports 30 lb weight loss since January this year.    He lives with his wife, has a son and a daughter who live close by.  Patient is hard of hearing.  He is independent with his ADLs and most office IADLs.  Patient's daughter tells me he cut grass day before yesterday.  Has a family history of lung cancer in his father and uterine cancer in his sister.  He is a former smoker and currently chews tobacco.  He used to work in an oil field currently retired.    Today 8/22/22: Patient here today with his wife and daughter for chemo education. He received his Xeloda in the mail and is scheduled to start radiation today. Patient reports symptoms of nausea however denies any vomiting.  He  denies any weight loss since his last visit with us.  He reports a fair appetite.  He denies any new symptoms of pain, heartburn, or GI blood loss.  He denies any headaches, vision changes focal weakness.       Imaging   07/29/2022 : PET-CT-moderate diffuse uptake at the suture line in the stomach which is probably inflammatory in etiology, however continued surveillance is recommended.  No convincing PET evidence of metastatic disease.  Mild uptake in the anterior left 4th and 5th rib which is favored being on a posttraumatic PC is however continue monitoring is recommended.  No other suspicious uptake    Pathology    07/12/2022:   Partial gastrectomy:   Adenocarcinoma with neuroendocrine features, moderate to poorly   differentiated (4.5 cm) with invasion of the serosa and direct extension into   the small intestine   Radial margin involved by invasive carcinoma   Proximal and distal surgical margins negative for dysplasia or malignancy   Lymphovascular invasion present   Metastatic carcinoma identified in eight out of 13 lymph nodes (8/13),   supported by AE1/AE3 immunostains with adequate controls.   The largest metastatic deposit measures 3 mm in greatest linear dimension   No definitive extranodal extension   PATHOLOGIC TNM STAGING: [id number]   HER2 1+, negative.  Positive for synaptophysin      06/16/2022  Fragments of gastric non-oxyntic mucosa (submitted as stomach ulcerated mass   biopsy):   -Infiltrating, poorly differentiated carcinoma with neuroendocrine features   -Morphologic and immunohistochemical features are not specifically suggestive   of either a primary gastric adenocarcinoma or a neuroendocrine carcinoma,   although neither of those two possibilities can be excluded.  The possibility   of a carcinoma from another site extending or metastasizing to the stomach   also cannot be excluded.  Positive for AE1/AE3, Negative for CK 7, CK 20, CDX 2.  CEA focally positive within benign glands.  HER2 1+,  weak positivity for synaptophysin but chromogranin is negative.    Full interpretation requires correlation with   clinical, radiographic, and other findings.   -MMR by immunohistochemical stains shows normal, retained MLH1, MSH2, MSH6,   and PMS1     Past Medical History:   Diagnosis Date    Cancer     gastric    Dizziness     Hypertension     Insomnia     Stomach ulcer     Tumor     removed from stomach       Past Surgical History:   Procedure Laterality Date    APPENDECTOMY      CHOLECYSTECTOMY      ESOPHAGOGASTRODUODENOSCOPY N/A 5/31/2022    Procedure: EGD (ESOPHAGOGASTRODUODENOSCOPY);  Surgeon: Mario Escalera MD;  Location: [facility] ENDO;  Service: Endoscopy;  Laterality: N/A;    GASTRECTOMY      GASTRECTOMY N/A 7/12/2022    Procedure: GASTRECTOMY;  Surgeon: Jordan Montesinos MD;  Location: [facility] OR;  Service: General;  Laterality: N/A;  RN PREOP 6/29/2022   T/S DAY OF SURGERY (LIVES 110 MILES AWAY)  HAS PCP CLEAR.    KNEE SURGERY      PARTIAL GASTRECTOMY      SHOULDER SURGERY Right        Family History   Problem Relation Age of Onset    No Known Problems Mother     No Known Problems Father     Hypertension Sister     Uterine cancer Sister     No Known Problems Brother     No Known Problems Maternal Grandmother     No Known Problems Maternal Grandfather     No Known Problems Paternal Grandmother     No Known Problems Paternal Grandfather        Social History     Socioeconomic History    Marital status:    Tobacco Use    Smoking status: Never Smoker    Smokeless tobacco: Current User     Types: Chew   Substance and Sexual Activity    Alcohol use: Not Currently    Drug use: Never    Sexual activity: Not Currently       Current Outpatient Medications   Medication Sig Dispense Refill    capecitabine (XELODA) 150 MG tablet Take 1 tablet (150 mg total) by mouth 2 (two) times daily Days 1-5 every week with radiation therapy with 1 other capecitabine prescription for 1,150 mg total. 50  tablet 0    capecitabine (XELODA) 500 MG Tab Take 2 tablets (1,000 mg total) by mouth 2 (two) times daily Days 1-5 every week with radiation therapy with 1 other capecitabine prescription for 1,150 mg total. 100 tablet 0    ondansetron (ZOFRAN-ODT) 4 MG TbDL Take 1 tablet (4 mg total) by mouth every 6 (six) hours as needed. 30 tablet 3    pantoprazole (PROTONIX) 40 MG tablet Take 1 tablet (40 mg total) by mouth 2 (two) times daily. 60 tablet 5    pantoprazole (PROTONIX) 40 MG tablet Take 1 tablet (40 mg total) by mouth once daily. 30 tablet 0    folic acid (FOLVITE) 1 MG tablet Take 1 tablet (1 mg total) by mouth once daily. 30 tablet 3    HYDROcodone-acetaminophen (NORCO) 5-325 mg per tablet Take 1 tablet by mouth every 6 (six) hours as needed for Pain. (Patient not taking: Reported on 8/22/2022) 30 tablet 0    promethazine (PHENERGAN) 12.5 MG Tab Take 1 tablet (12.5 mg total) by mouth every 4 (four) hours as needed (nausea/vomiting). 30 tablet 1    traZODone (DESYREL) 50 MG tablet Take 1 tablet by mouth in the evening (Patient not taking: Reported on 8/22/2022) 30 tablet 1     No current facility-administered medications for this visit.       Review of patient's allergies indicates:   Allergen Reactions    Ativan [lorazepam] Anxiety     Patient had paradoxical effect after ativan IV and was so agitated he had to be restrained and transferred to ICU for precedex.       Review of system  CONSTITUTIONAL: no fevers, no chills, + weight loss, no fatigue, no weakness  HEMATOLOGIC: no abnormal bleeding, no abnormal bruising, no drenching night sweats  ONCOLOGIC: no new masses or lumps  HEENT: no vision loss, no tinnitus or hearing loss, no nose bleeding, no dysphagia, no odynophagia  CVS: no chest pain, no palpitations, no dyspnea on exertion  RESP: no shortness of breath, no hemoptysis, no cough  GI: + nausea, no vomiting, no diarrhea, no constipation, no melena, no hematochezia, no hematemesis, + abdominal  pain, no increase in abdominal girth  : no dysuria, no hematuria, no hesitancy, no scrotal swelling, no discharge  INTEGUMENT: no rashes, no abnormal bruising, no nail pitting, no hyperpigmentation  NEURO: no falls, no memory loss, no paresthesias or dysesthesias, no urofecal incontinence or retention, no loss of strength on any extremity  MSK: no back pain, no new joint pain, no joint swelling  PSYCH: no suicidal or homicidal ideation, no depression, no insomnia, no anhedonia  ENDOCRINE: no heat or cold intolerance, no polyuria, no polydipsia    Physical Exam:    Vitals:    08/22/22 1054   BP: 114/77   Pulse: 79   Resp: 16   Temp: 97.7 °F (36.5 °C)       ECOG PS 1-2  GA: AAOx3, NAD, accompanied by his wife and daughter.  Bitemporal wasting  HEENT: NCAT, PERRLA, EOMI, Poor dentition, moist oral mucous membranes, hard of hearing  LYMPH: no cervical, axillary or supraclavicular adenopathy  CVS: s1s2 RRR, no M/R/G  RESP: CTA b/l, no crackles, no wheezes or rhonchi  ABD: soft, NT, ND, BS+, no hepatosplenomegaly, healing midline incision without surrounding edema, erythema or discharge.  EXT: no deformities, no pedal edema  SKIN: no rashes, warm and dry  NEURO: normal mentation, strength 5/5 on all 4 extremities, no sensory deficits      Assessment plan    # Gastric adenocarcinoma, pT4 pN3aMx, poorly differentiated, SANJAY, HER2 negative,   -  Status post partial gastrectomy on 07/12/2022,  positive radial margins, 8/13 positive lymph nodes  - Advanced age, relatively fit ECOG 1-2  - Hard of hearing, limited insight into his disease.  - MD previously discussed with patient, his wife and daughter the diagnosis, staging at this point, need for further workup to complete staging and further treatment recommendations including concurrent chemo RT given positive margins.   - Patient would be a poor candidate for multi agent systemic chemotherapy in adjuvant setting given his advanced age.  - PET-CT without evidence of distant  metastatic disease  - noted recommendation from Radiation Oncology for adjuvant chemo RT.    - MD previously discussed the plan to initiate chemo RT with Xeloda, dose at 625 mg per m2 p.o. days 1-5 for maximum of 5 weeks with radiation.      Discussed Goals of Therapy-Yes.    Discussed Chemo Drugs-Yes.    Discussed Chemo Regimen-Yes.    Discussed short - and long-term adverse Effects-Yes.    Discussed S/S to call MD/NP-Yes.    Written material given to patient-Given.    Questions answered-Yes.    Verbalized Understanding-Yes.    Counseled::  Patient, spouse, daughter Regarding diagnosis, Regarding treatment, Regarding medications, Regarding diet, Regarding activity, Verbalized understanding.        Plan  Provided in depth education today on treatment regimen including possible side effects.   Plan for concurrent chemo RT with Capecitabine 625 mg/m2 (1150 mg) PO. twice daily on days 1-5 for a maximum of 5 weeks.  Proceed with treatment today as planned. Patient took his first dose of Xeloda today while in clinic and is scheduled for his first radiation treatment this afternoon  C/w Zofran PRN nausea. Will also send phenergan to his pharmacy for PRN use. Okay to alternate zofran and phenergan if needed. Explained that phenergan may cause drowsiness.  Folic acid def noted on recent labs, will start folic acid 1 mg po daily, script sent to pharmacy  B12 also borderline low, He lives in [place] so we will hold off on B12 injections for now and will attempt B12 1000 mcg SL daily first to see if any improvement  Iron deficiency also noted, will proceed with IV injectafer x 2 doses  Follow-up in clinic in 2 weeks with repeat labs to assess treatment tolerance  Call if any questions or concerns      HOLLY Najera

## 2022-08-31 DIAGNOSIS — C16.2 MALIGNANT NEOPLASM OF BODY OF STOMACH: ICD-10-CM

## 2022-08-31 RX ORDER — CAPECITABINE 500 MG/1
1000 TABLET, FILM COATED ORAL 2 TIMES DAILY
Qty: 100 TABLET | Refills: 0 | Status: CANCELLED | OUTPATIENT
Start: 2022-08-31

## 2022-09-02 ENCOUNTER — DOCUMENTATION ONLY (OUTPATIENT)
Dept: HEMATOLOGY/ONCOLOGY | Facility: CLINIC | Age: 87
End: 2022-09-02
Payer: MEDICARE

## 2022-09-02 DIAGNOSIS — C16.2 MALIGNANT NEOPLASM OF BODY OF STOMACH: ICD-10-CM

## 2022-09-02 RX ORDER — CAPECITABINE 150 MG/1
150 TABLET, FILM COATED ORAL 2 TIMES DAILY
Qty: 50 TABLET | Refills: 0 | Status: SHIPPED | OUTPATIENT
Start: 2022-09-02 | End: 2022-09-06 | Stop reason: SDUPTHER

## 2022-09-02 NOTE — PROGRESS NOTES
Patients wife called in and stated that he will be out of his 150 mg of Xeloda. She said he was taking (1) 500mg and (2) 150mg, instead of (2) 500mg  and (1) 150mg. I gave instructions again on correct way to take it. Dr. BENSON notified and will send meds to pharmacy.     Instructed patient to continue taking his 500mg as directed even if he does not get his new rx of 150mg in

## 2022-09-06 DIAGNOSIS — C16.2 MALIGNANT NEOPLASM OF BODY OF STOMACH: ICD-10-CM

## 2022-09-06 RX ORDER — CAPECITABINE 150 MG/1
150 TABLET, FILM COATED ORAL 2 TIMES DAILY
Qty: 50 TABLET | Refills: 0 | Status: SHIPPED | OUTPATIENT
Start: 2022-09-06 | End: 2022-12-22

## 2022-09-06 RX ORDER — CAPECITABINE 500 MG/1
1000 TABLET, FILM COATED ORAL 2 TIMES DAILY
Qty: 100 TABLET | Refills: 0 | Status: SHIPPED | OUTPATIENT
Start: 2022-09-06 | End: 2022-12-22

## 2022-09-06 NOTE — PROGRESS NOTES
Referring provider: Dr. Yamel HU  Reason for consult:  Gastric adenocarcinoma      Current treatment  08/24/2022-current:  Concurrent chemo RT      Treatment history   07/12/2022-partial gastrectomy       Patient is a 86-year-old with no significant past medical history except for gastric ulcer status post surgery 40-50 years back and cholecystectomy who presented to clinic to establish care for new diagnosis of gastric adenocarcinoma.  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  Patient underwent CT abdomen pelvis with contrast without evidence of metastatic disease.  He was scheduled for a partial gastrectomy which he had on 07/12/2022, pathology from which revealed adenocarcinoma with neuroendocrine features, moderate to poorly differentiated, with positive radial margin, with invasion into the serosa and direct invasion the small intestine as well as 8 positive lymph nodes.  Patient has healed well from surgery and is back to his baseline in terms of his functional status.  He reports intermittent symptoms of nausea and vomiting however denies any change in his bowel habits.  He denies any further episodes of hemoptysis hematemesis, hematochezia or melena.  Patient reports 30 lb weight loss since January this year.    He lives with his wife, has a son and a daughter who live close by.  Patient is hard of hearing.  He is independent with his ADLs and most office IADLs.  Patient's daughter tells me he cut grass day before yesterday.  Has a family history of lung cancer in his father and uterine cancer in his sister.  He is a former smoker and currently chews tobacco.  He used to work in an oil field currently retired.    Today, 09/07/2022, patient reports tolerating treatment well with symptoms of intermittent nausea  controlled with p.r.n. Zofran and Phenergan.  He reports mild fatigue as well.  He reports two episode of throwing up blood 1 week apart since starting treatment.  He denies any blood in his stools or dark tarry stools.  He denies any chest pain, shortness of breath, palpitations, dizziness or lightheadedness.    Imaging   07/29/2022 : PET-CT-moderate diffuse uptake at the suture line in the stomach which is probably inflammatory in etiology, however continued surveillance is recommended.  No convincing PET evidence of metastatic disease.  Mild uptake in the anterior left 4th and 5th rib which is favored being on a posttraumatic PC is however continue monitoring is recommended.  No other suspicious uptake    Pathology    07/12/2022:   Partial gastrectomy:   Adenocarcinoma with neuroendocrine features, moderate to poorly   differentiated (4.5 cm) with invasion of the serosa and direct extension into   the small intestine   Radial margin involved by invasive carcinoma   Proximal and distal surgical margins negative for dysplasia or malignancy   Lymphovascular invasion present   Metastatic carcinoma identified in eight out of 13 lymph nodes (8/13),   supported by AE1/AE3 immunostains with adequate controls.   The largest metastatic deposit measures 3 mm in greatest linear dimension   No definitive extranodal extension   PATHOLOGIC TNM STAGING: [id number]   HER2 1+, negative.  Positive for synaptophysin      06/16/2022  Fragments of gastric non-oxyntic mucosa (submitted as stomach ulcerated mass   biopsy):   -Infiltrating, poorly differentiated carcinoma with neuroendocrine features   -Morphologic and immunohistochemical features are not specifically suggestive   of either a primary gastric adenocarcinoma or a neuroendocrine carcinoma,   although neither of those two possibilities can be excluded.  The possibility   of a carcinoma from another site extending or metastasizing to the stomach   also cannot be  excluded.  Positive for AE1/AE3, Negative for CK 7, CK 20, CDX 2.  CEA focally positive within benign glands.  HER2 1+, weak positivity for synaptophysin but chromogranin is negative.    Full interpretation requires correlation with   clinical, radiographic, and other findings.   -MMR by immunohistochemical stains shows normal, retained MLH1, MSH2, MSH6,   and PMS1     Laboratory   09/06/2022:  Creatinine 0.84, albumin 3.8, calcium 9.4, alk-phos 100, total bili 1.4,  AST 14, ALT 8, WBC count 5.26, hemoglobin 12.4, MCV 84.4, platelet count 300, ANC 3.94.    Past Medical History:   Diagnosis Date    Cancer     gastric    Dizziness     Hypertension     Insomnia     Stomach ulcer     Tumor     removed from stomach       Past Surgical History:   Procedure Laterality Date    APPENDECTOMY      CHOLECYSTECTOMY      ESOPHAGOGASTRODUODENOSCOPY N/A 5/31/2022    Procedure: EGD (ESOPHAGOGASTRODUODENOSCOPY);  Surgeon: Mario Escalera MD;  Location: [facility] ENDO;  Service: Endoscopy;  Laterality: N/A;    GASTRECTOMY      GASTRECTOMY N/A 7/12/2022    Procedure: GASTRECTOMY;  Surgeon: Jordan Montesinos MD;  Location: [facility] OR;  Service: General;  Laterality: N/A;  RN PREOP 6/29/2022   T/S DAY OF SURGERY (LIVES [place] MIL[place] AWAY)  HAS PCP CLEAR.    KNEE SURGERY      PARTIAL GASTRECTOMY      SHOULDER SURGERY Right        Family History   Problem Relation Age of Onset    No Known Problems Mother     No Known Problems Father     Hypertension Sister     Uterine cancer Sister     No Known Problems Brother     No Known Problems Maternal Grandmother     No Known Problems Maternal Grandfather     No Known Problems Paternal Grandmother     No Known Problems Paternal Grandfather        Social History     Socioeconomic History    Marital status:    Tobacco Use    Smoking status: Never    Smokeless tobacco: Current     Types: Chew   Substance and Sexual Activity    Alcohol use: Not Currently    Drug use: Never    Sexual activity: Not Currently        Current Outpatient Medications   Medication Sig Dispense Refill    capecitabine (XELODA) 150 MG tablet Take 1 tablet (150 mg total) by mouth 2 (two) times daily Days 1-5 every week with radiation therapy with 1 other capecitabine prescription for 1,150 mg total. 50 tablet 0    capecitabine (XELODA) 500 MG Tab Take 2 tablets (1,000 mg total) by mouth 2 (two) times daily Days 1-5 every week with radiation therapy with 1 other capecitabine prescription for 1,150 mg total. 100 tablet 0    cyanocobalamin, vitamin B-12, 1,000 mcg Subl Place 1,000 mcg under the tongue once daily. 30 tablet 4    folic acid (FOLVITE) 1 MG tablet Take 1 tablet (1 mg total) by mouth once daily. 30 tablet 3    HYDROcodone-acetaminophen (NORCO) 5-325 mg per tablet Take 1 tablet by mouth every 6 (six) hours as needed for Pain. 30 tablet 0    pantoprazole (PROTONIX) 40 MG tablet Take 1 tablet (40 mg total) by mouth once daily. 30 tablet 0    promethazine (PHENERGAN) 12.5 MG Tab Take 1 tablet (12.5 mg total) by mouth every 4 (four) hours as needed (nausea/vomiting). 30 tablet 1    traZODone (DESYREL) 50 MG tablet Take 1 tablet by mouth in the evening 30 tablet 1     No current facility-administered medications for this visit.       Review of patient's allergies indicates:   Allergen Reactions    Ativan [lorazepam] Anxiety     Patient had paradoxical effect after ativan IV and was so agitated he had to be restrained and transferred to ICU for precedex.       Review of system  CONSTITUTIONAL: no fevers, no chills, + weight loss, no fatigue, no weakness  HEMATOLOGIC: no abnormal bleeding, no abnormal bruising, no drenching night sweats  ONCOLOGIC: no new masses or lumps  HEENT: no vision loss, no tinnitus or hearing loss, no nose bleeding, no dysphagia, no odynophagia  CVS: no chest pain, no palpitations, no dyspnea on exertion  RESP: no shortness of breath, no hemoptysis, no cough  GI: + nausea, no vomiting, no diarrhea, no constipation, no  melena, no hematochezia, no hematemesis, + abdominal pain, no increase in abdominal girth  : no dysuria, no hematuria, no hesitancy, no scrotal swelling, no discharge  INTEGUMENT: no rashes, no abnormal bruising, no nail pitting, no hyperpigmentation  NEURO: no falls, no memory loss, no paresthesias or dysesthesias, no urofecal incontinence or retention, no loss of strength on any extremity  MSK: no back pain, no new joint pain, no joint swelling  PSYCH: no suicidal or homicidal ideation, no depression, no insomnia, no anhedonia  ENDOCRINE: no heat or cold intolerance, no polyuria, no polydipsia    Physical Exam:    Vitals:    09/07/22 0957   BP: 110/74   Pulse: 78   Resp: 18   Temp: 97.6 °F (36.4 °C)         ECOG PS 1-2  GA: AAOx3, NAD, accompanied by his wife and daughter.  Bitemporal wasting  HEENT: NCAT, PERRLA, EOMI, Poor dentition, moist oral mucous membranes, hard of hearing  LYMPH: no cervical, axillary or supraclavicular adenopathy  CVS: s1s2 RRR, no M/R/G  RESP: CTA b/l, no crackles, no wheezes or rhonchi  ABD: soft, NT, ND, BS+, no hepatosplenomegaly, healing midline incision with staples in place without surrounding edema, erythema or discharge.  EXT: no deformities, no pedal edema  SKIN: no rashes, warm and dry  NEURO: normal mentation, strength 5/5 on all 4 extremities, no sensory deficits      Assessment plan    # Gastric adenocarcinoma, pT4 pN3aMx, poorly differentiated, SANJAY, HER2 negative,   -  Status post partial gastrectomy on 07/12/2022,  positive radial margins, 8/13 positive lymph nodes  - Advanced age, relatively fit ECOG 1-2  - Hard of hearing, limited insight into his disease.  - Discussed with patient, his wife and daughter the diagnosis, staging at this point, need for further workup to complete staging and further treatment recommendations including concurrent chemo RT given positive margins.   - Patient would be a poor candidate for multi agent systemic chemotherapy in adjuvant setting  given his advanced age.  - PET-CT without evidence of distant metastatic disease  - noted recommendation from Radiation Oncology for adjuvant chemo RT.    Discussed the plan to initiate chemo RT with Xeloda, dose at 625 mg per m2 p.o. days 1-5 for maximum of 5 weeks with radiation.  - patient was taking 800 mg b.i.d. instead of 1125 mg b.i.d..  We represcribed  [id number] mg tablets which are pending delivery from pharmacy.  -he is tolerating treatment fairly well with intermittent symptoms of nausea which are controlled with antinausea regimen.  Noted 5 lb weight loss since his last follow-up visit with us.    Plan    Reviewed labs, okay to Continue Capecitabine 625 mg per m2 p.o. twice daily on days 1-5 for a maximum of 5 weeks.    Patient is currently out of 150 mg tablets due to dosing his medications wrong in the past.  He will continue 1000 mg b.i.d. until the 150 mg tablets are sent to him from pharmacy.  Follow-up in clinic in 3 weeks with repeat labs with me to assess treatment tolerance

## 2022-09-07 ENCOUNTER — OFFICE VISIT (OUTPATIENT)
Dept: HEMATOLOGY/ONCOLOGY | Facility: CLINIC | Age: 87
End: 2022-09-07
Payer: MEDICARE

## 2022-09-07 VITALS
TEMPERATURE: 98 F | HEIGHT: 70 IN | BODY MASS INDEX: 22.12 KG/M2 | HEART RATE: 78 BPM | RESPIRATION RATE: 18 BRPM | SYSTOLIC BLOOD PRESSURE: 110 MMHG | DIASTOLIC BLOOD PRESSURE: 74 MMHG | OXYGEN SATURATION: 99 % | WEIGHT: 154.5 LBS

## 2022-09-07 DIAGNOSIS — C16.2 MALIGNANT NEOPLASM OF BODY OF STOMACH: Primary | ICD-10-CM

## 2022-09-07 DIAGNOSIS — Z51.11 ENCOUNTER FOR ANTINEOPLASTIC CHEMOTHERAPY: ICD-10-CM

## 2022-09-07 PROCEDURE — 99215 OFFICE O/P EST HI 40 MIN: CPT | Mod: ,,, | Performed by: STUDENT IN AN ORGANIZED HEALTH CARE EDUCATION/TRAINING PROGRAM

## 2022-09-07 PROCEDURE — 99215 PR OFFICE/OUTPT VISIT, EST, LEVL V, 40-54 MIN: ICD-10-PCS | Mod: ,,, | Performed by: STUDENT IN AN ORGANIZED HEALTH CARE EDUCATION/TRAINING PROGRAM

## 2022-09-16 ENCOUNTER — OFFICE VISIT (OUTPATIENT)
Dept: HEMATOLOGY/ONCOLOGY | Facility: CLINIC | Age: 87
End: 2022-09-16
Payer: MEDICARE

## 2022-09-16 VITALS
BODY MASS INDEX: 22.26 KG/M2 | WEIGHT: 155.5 LBS | DIASTOLIC BLOOD PRESSURE: 77 MMHG | TEMPERATURE: 98 F | SYSTOLIC BLOOD PRESSURE: 104 MMHG | OXYGEN SATURATION: 98 % | RESPIRATION RATE: 18 BRPM | HEIGHT: 70 IN | HEART RATE: 87 BPM

## 2022-09-16 DIAGNOSIS — C16.2 MALIGNANT NEOPLASM OF BODY OF STOMACH: Primary | ICD-10-CM

## 2022-09-16 PROCEDURE — 99214 PR OFFICE/OUTPT VISIT, EST, LEVL IV, 30-39 MIN: ICD-10-PCS | Mod: ,,, | Performed by: INTERNAL MEDICINE

## 2022-09-16 PROCEDURE — 99214 OFFICE O/P EST MOD 30 MIN: CPT | Mod: ,,, | Performed by: INTERNAL MEDICINE

## 2022-09-16 RX ORDER — ONDANSETRON 4 MG/1
4 TABLET, FILM COATED ORAL 4 TIMES DAILY PRN
COMMUNITY
Start: 2022-09-07 | End: 2022-12-22

## 2022-09-16 NOTE — PROGRESS NOTES
DATE:  09/16/2022    PROBLEM:  Gastric cancer.  Status post partial gastrectomy 07/2022.  Now receiving postoperative adjuvant chemo/XRT    HxPI:  86-year-old with no significant past medical history except for gastric ulcer status post surgery 40-50 years back and cholecystectomy who presented to clinic to establish care for new diagnosis of gastric adenocarcinoma.  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  Patient underwent CT abdomen pelvis with contrast without evidence of metastatic disease.  He was scheduled for a partial gastrectomy which he had on 07/12/2022, pathology from which revealed adenocarcinoma with neuroendocrine features, moderate to poorly differentiated, with positive radial margin, with invasion into the serosa and direct invasion the small intestine as well as 8 positive lymph nodes.  Patient has healed well from surgery and is back to his baseline in terms of his functional status.  He reports intermittent symptoms of nausea and vomiting however denies any change in his bowel habits.  He denies any further episodes of hemoptysis hematemesis, hematochezia or melena.  Patient reports 30 lb weight loss since January this year. He lives with his wife, has a son and a daughter who live close by.  Patient is hard of hearing.  He is independent with his ADLs and most office IADLs.  Patient's daughter tells me he cut grass day before yesterday.  Has a family history of lung cancer in his father and uterine cancer in his sister.  He is a former smoker and currently chews tobacco.  He used to work in an oil field currently retired.    INTERVAL Hx:  [id number]  Since last seen, patient states he is done surprisingly well.  Tolerating the daily oral capecitabine without difficulty.  XRT is underway.  He only has 1 more  week of oral Xeloda to go and 1 week of radiation treatment and his post gastrectomy adjuvant Rx will be completed.    IMAGIN2022 : PET-CT-moderate diffuse uptake at the suture line in the stomach which is probably inflammatory in etiology, however continued surveillance is recommended.  No convincing PET evidence of metastatic disease.  Mild uptake in the anterior left 4th and 5th rib which is favored being on a posttraumatic PC is however continue monitoring is recommended.  No other suspicious uptake    PATHOLOGY:  2022:   Partial gastrectomy:   Adenocarcinoma with neuroendocrine features, moderate to poorly   differentiated (4.5 cm) with invasion of the serosa and direct extension into   the small intestine   Radial margin involved by invasive carcinoma   Proximal and distal surgical margins negative for dysplasia or malignancy   Lymphovascular invasion present   Metastatic carcinoma identified in eight out of 13 lymph nodes (8/13),   supported by AE1/AE3 immunostains with adequate controls.   The largest metastatic deposit measures 3 mm in greatest linear dimension   No definitive extranodal extension   PATHOLOGIC TNM STAGING: [id number]   HER2 1+, negative.  Positive for synaptophysin      2022  Fragments of gastric non-oxyntic mucosa (submitted as stomach ulcerated mass   biopsy):   -Infiltrating, poorly differentiated carcinoma with neuroendocrine features   -Morphologic and immunohistochemical features are not specifically suggestive   of either a primary gastric adenocarcinoma or a neuroendocrine carcinoma,   although neither of those two possibilities can be excluded.  The possibility   of a carcinoma from another site extending or metastasizing to the stomach   also cannot be excluded.  Positive for AE1/AE3, Negative for CK 7, CK 20, CDX 2.  CEA focally positive within benign glands.  HER2 1+, weak positivity for synaptophysin but chromogranin is negative.    Full interpretation  requires correlation with   clinical, radiographic, and other findings.   -MMR by immunohistochemical stains shows normal, retained MLH1, MSH2, MSH6,   and PMS1     LABS:  09/06/2022:  Creatinine 0.84, albumin 3.8, calcium 9.4, alk-phos 100, total bili 1.4,  AST 14, ALT 8, WBC count 5.26, hemoglobin 12.4, MCV 84.4, platelet count 300, ANC 3.94.    Past Medical History:   Diagnosis Date    Cancer     gastric    Dizziness     Hypertension     Insomnia     Stomach ulcer     Tumor     removed from stomach       Past Surgical History:   Procedure Laterality Date    APPENDECTOMY      CHOLECYSTECTOMY      ESOPHAGOGASTRODUODENOSCOPY N/A 5/31/2022    Procedure: EGD (ESOPHAGOGASTRODUODENOSCOPY);  Surgeon: Mario Escalera MD;  Location: [facility] ENDO;  Service: Endoscopy;  Laterality: N/A;    GASTRECTOMY      GASTRECTOMY N/A 7/12/2022    Procedure: GASTRECTOMY;  Surgeon: Jordan Montesinos MD;  Location: [facility] OR;  Service: General;  Laterality: N/A;  RN PREOP 6/29/2022   T/S DAY OF SURGERY (LIVES 110 MILES AWAY)  HAS PCP CLEAR.    KNEE SURGERY      PARTIAL GASTRECTOMY      SHOULDER SURGERY Right        Family History   Problem Relation Age of Onset    No Known Problems Mother     No Known Problems Father     Hypertension Sister     Uterine cancer Sister     No Known Problems Brother     No Known Problems Maternal Grandmother     No Known Problems Maternal Grandfather     No Known Problems Paternal Grandmother     No Known Problems Paternal Grandfather        Social History     Socioeconomic History    Marital status:    Tobacco Use    Smoking status: Never    Smokeless tobacco: Current     Types: Chew   Substance and Sexual Activity    Alcohol use: Not Currently    Drug use: Never    Sexual activity: Not Currently       Current Outpatient Medications   Medication Sig Dispense Refill    capecitabine (XELODA) 150 MG tablet Take 1 tablet (150 mg total) by mouth 2 (two) times daily Days 1-5 every week with radiation therapy with  1 other capecitabine prescription for 1,150 mg total. 50 tablet 0    capecitabine (XELODA) 500 MG Tab Take 2 tablets (1,000 mg total) by mouth 2 (two) times daily Days 1-5 every week with radiation therapy with 1 other capecitabine prescription for 1,150 mg total. 100 tablet 0    cyanocobalamin, vitamin B-12, 1,000 mcg Subl Place 1,000 mcg under the tongue once daily. 30 tablet 4    folic acid (FOLVITE) 1 MG tablet Take 1 tablet (1 mg total) by mouth once daily. 30 tablet 3    HYDROcodone-acetaminophen (NORCO) 5-325 mg per tablet Take 1 tablet by mouth every 6 (six) hours as needed for Pain. 30 tablet 0    ondansetron (ZOFRAN) 4 MG tablet Take 4 mg by mouth 4 (four) times daily as needed.      pantoprazole (PROTONIX) 40 MG tablet Take 1 tablet (40 mg total) by mouth once daily. 30 tablet 0    promethazine (PHENERGAN) 12.5 MG Tab Take 1 tablet (12.5 mg total) by mouth every 4 (four) hours as needed (nausea/vomiting). 30 tablet 1    traZODone (DESYREL) 50 MG tablet Take 1 tablet by mouth in the evening 30 tablet 1     No current facility-administered medications for this visit.       Review of patient's allergies indicates:   Allergen Reactions    Ativan [lorazepam] Anxiety     Patient had paradoxical effect after ativan IV and was so agitated he had to be restrained and transferred to ICU for precedex.       ROS:  CONSTITUTIONAL: no fevers, no chills, + weight loss, no fatigue, no weakness  HEMATOLOGIC: no abnormal bleeding, no abnormal bruising, no drenching night sweats  ONCOLOGIC: no new masses or lumps  HEENT: no vision loss, no tinnitus or hearing loss, no nose bleeding, no dysphagia, no odynophagia  CVS: no chest pain, no palpitations, no dyspnea on exertion  RESP: no shortness of breath, no hemoptysis, no cough  GI: + nausea, no vomiting, no diarrhea, no constipation, no melena, no hematochezia, no hematemesis, + abdominal pain, no increase in abdominal girth  : no dysuria, no hematuria, no hesitancy, no  scrotal swelling, no discharge  INTEGUMENT: no rashes, no abnormal bruising, no nail pitting, no hyperpigmentation  NEURO: no falls, no memory loss, no paresthesias or dysesthesias, no urofecal incontinence or retention, no loss of strength on any extremity  MSK: no back pain, no new joint pain, no joint swelling  PSYCH: no suicidal or homicidal ideation, no depression, no insomnia, no anhedonia  ENDOCRINE: no heat or cold intolerance, no polyuria, no polydipsia    OBSERVATIONS:  ECOG PS 1-2  GENERAL: AAOx3, NAD, accompanied by his wife and daughter.  Bitemporal wasting  VS:  Afebrile.  104/77  HEENT: NCAT, PERRLA, EOMI, Poor dentition, moist oral mucous membranes, hard of hearing  LYMPH: no cervical, axillary or supraclavicular adenopathy  CVS: s1s2 RRR, no M/R/G  RESP: CTA b/l, no crackles, no wheezes or rhonchi  ABD: soft, NT, ND, BS+, no hepatosplenomegaly, healing midline incision with staples in place without surrounding edema, erythema or discharge.  EXT: no deformities, no pedal edema  SKIN: no rashes, warm and dry  NEURO: normal mentation, strength 5/5 on all 4 extremities, no sensory deficits      ASSESSMENT:  Gastric adenocarcinoma, pT4 pN3aMx, poorly differentiated, SANJAY, HER2 negative,   -  Status post partial gastrectomy on 07/12/2022,  positive radial margins, 8/13 positive lymph nodes  - Advanced age, relatively fit ECOG 1-2  - PET-CT without evidence of distant metastatic disease  Completing postoperative adjuvant Chemo Rx/XRT with Xeloda, dose at 625 mg per m2 p.o. days 1-5 for maximum of 5 weeks with radiation.    PLAN:  Treatment will be wrapping up next week.  We will take another look at him at completion of treatment with CBC and CMP on RTC.  Six or 8 weeks post completion of combined therapy adjuvant Rx, it be reasonable to get repeat imaging studies and EGD study to affirm remission status.  After that, be a simple game of Q three-month active surveillance for the next 2 years.      RAMANA ALMENDAREZ  SHERRI CARDENAS., FACP

## 2022-09-27 ENCOUNTER — OFFICE VISIT (OUTPATIENT)
Dept: HEMATOLOGY/ONCOLOGY | Facility: CLINIC | Age: 87
End: 2022-09-27
Payer: MEDICARE

## 2022-09-27 VITALS
HEART RATE: 80 BPM | DIASTOLIC BLOOD PRESSURE: 78 MMHG | OXYGEN SATURATION: 88 % | WEIGHT: 147.13 LBS | BODY MASS INDEX: 21.11 KG/M2 | SYSTOLIC BLOOD PRESSURE: 117 MMHG | TEMPERATURE: 98 F | RESPIRATION RATE: 16 BRPM

## 2022-09-27 DIAGNOSIS — C16.2 MALIGNANT NEOPLASM OF BODY OF STOMACH: Primary | ICD-10-CM

## 2022-09-27 PROCEDURE — 99214 PR OFFICE/OUTPT VISIT, EST, LEVL IV, 30-39 MIN: ICD-10-PCS | Mod: ,,, | Performed by: INTERNAL MEDICINE

## 2022-09-27 PROCEDURE — 99214 OFFICE O/P EST MOD 30 MIN: CPT | Mod: ,,, | Performed by: INTERNAL MEDICINE

## 2022-09-27 NOTE — PROGRESS NOTES
DATE:  2022    PROBLEM:  Gastric cancer.  Status post partial gastrectomy 2022.  Now receiving postoperative adjuvant chemo/XRT    HxPI:  86-year-old with no significant past medical history except for gastric ulcer status post surgery 40-50 years back and cholecystectomy who presented to clinic to establish care for new diagnosis of gastric adenocarcinoma.  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  Patient underwent CT abdomen pelvis with contrast without evidence of metastatic disease.  He was scheduled for a partial gastrectomy which he had on 2022, pathology from which revealed adenocarcinoma with neuroendocrine features, moderate to poorly differentiated, with positive radial margin, with invasion into the serosa and direct invasion the small intestine as well as 8 positive lymph nodes.  Patient has healed well from surgery and is back to his baseline in terms of his functional status.  He reports intermittent symptoms of nausea and vomiting however denies any change in his bowel habits.  He denies any further episodes of hemoptysis hematemesis, hematochezia or melena.  Patient reports 30 lb weight loss since January this year. He lives with his wife, has a son and a daughter who live close by.  Patient is hard of hearing.  He is independent with his ADLs and most office IADLs.  Patient's daughter tells me he cut grass day before yesterday.  Has a family history of lung cancer in his father and uterine cancer in his sister.  He is a former smoker and currently chews tobacco.  He used to work in an oil field currently retired.    INTERVAL Hx:  2022  Radiation therapy wrapped up today.  His treatment is now completed.  Patient states he feels exhausted from the treatment ordeal.    IMAGIN2022 :  PET-CT-moderate diffuse uptake at the suture line in the stomach which is probably inflammatory in etiology, however continued surveillance is recommended.  No convincing PET evidence of metastatic disease.  Mild uptake in the anterior left 4th and 5th rib which is favored being on a posttraumatic PC is however continue monitoring is recommended.  No other suspicious uptake    PATHOLOGY:  07/12/2022:   Partial gastrectomy:   Adenocarcinoma with neuroendocrine features, moderate to poorly   differentiated (4.5 cm) with invasion of the serosa and direct extension into   the small intestine   Radial margin involved by invasive carcinoma   Proximal and distal surgical margins negative for dysplasia or malignancy   Lymphovascular invasion present   Metastatic carcinoma identified in eight out of 13 lymph nodes (8/13),   supported by AE1/AE3 immunostains with adequate controls.   The largest metastatic deposit measures 3 mm in greatest linear dimension   No definitive extranodal extension   PATHOLOGIC TNM STAGING: [id number]   HER2 1+, negative.  Positive for synaptophysin      06/16/2022  Fragments of gastric non-oxyntic mucosa (submitted as stomach ulcerated mass   biopsy):   -Infiltrating, poorly differentiated carcinoma with neuroendocrine features   -Morphologic and immunohistochemical features are not specifically suggestive   of either a primary gastric adenocarcinoma or a neuroendocrine carcinoma,   although neither of those two possibilities can be excluded.  The possibility   of a carcinoma from another site extending or metastasizing to the stomach   also cannot be excluded.  Positive for AE1/AE3, Negative for CK 7, CK 20, CDX 2.  CEA focally positive within benign glands.  HER2 1+, weak positivity for synaptophysin but chromogranin is negative.    Full interpretation requires correlation with   clinical, radiographic, and other findings.   -MMR by immunohistochemical stains shows normal, retained MLH1, MSH2,  MSH6,   and PMS1     LABS:  09/06/2022:  Creatinine 0.84, albumin 3.8, calcium 9.4, alk-phos 100, total bili 1.4,  AST 14, ALT 8, WBC count 5.26, hemoglobin 12.4, MCV 84.4, platelet count 300, ANC 3.94.    Past Medical History:   Diagnosis Date    Cancer     gastric    Dizziness     Hypertension     Insomnia     Stomach ulcer     Tumor     removed from stomach       Past Surgical History:   Procedure Laterality Date    APPENDECTOMY      CHOLECYSTECTOMY      ESOPHAGOGASTRODUODENOSCOPY N/A 5/31/2022    Procedure: EGD (ESOPHAGOGASTRODUODENOSCOPY);  Surgeon: Mario Escalera MD;  Location: [facility] ENDO;  Service: Endoscopy;  Laterality: N/A;    GASTRECTOMY      GASTRECTOMY N/A 7/12/2022    Procedure: GASTRECTOMY;  Surgeon: Jordan Montesinos MD;  Location: [facility] OR;  Service: General;  Laterality: N/A;  RN PREOP 6/29/2022   T/S DAY OF SURGERY (LIVES 110 MIL[place] AWAY)  HAS PCP CLEAR.    KNEE SURGERY      PARTIAL GASTRECTOMY      SHOULDER SURGERY Right        Family History   Problem Relation Age of Onset    No Known Problems Mother     No Known Problems Father     Hypertension Sister     Uterine cancer Sister     No Known Problems Brother     No Known Problems Maternal Grandmother     No Known Problems Maternal Grandfather     No Known Problems Paternal Grandmother     No Known Problems Paternal Grandfather        Social History     Socioeconomic History    Marital status:    Tobacco Use    Smoking status: Never    Smokeless tobacco: Current     Types: Chew   Substance and Sexual Activity    Alcohol use: Not Currently    Drug use: Never    Sexual activity: Not Currently       Current Outpatient Medications   Medication Sig Dispense Refill    capecitabine (XELODA) 150 MG tablet Take 1 tablet (150 mg total) by mouth 2 (two) times daily Days 1-5 every week with radiation therapy with 1 other capecitabine prescription for 1,150 mg total. 50 tablet 0    capecitabine (XELODA) 500 MG Tab Take 2 tablets (1,000 mg total) by mouth 2  (two) times daily Days 1-5 every week with radiation therapy with 1 other capecitabine prescription for 1,150 mg total. 100 tablet 0    cyanocobalamin, vitamin B-12, 1,000 mcg Subl Place 1,000 mcg under the tongue once daily. 30 tablet 4    folic acid (FOLVITE) 1 MG tablet Take 1 tablet (1 mg total) by mouth once daily. 30 tablet 3    HYDROcodone-acetaminophen (NORCO) 5-325 mg per tablet Take 1 tablet by mouth every 6 (six) hours as needed for Pain. 30 tablet 0    ondansetron (ZOFRAN) 4 MG tablet Take 4 mg by mouth 4 (four) times daily as needed.      pantoprazole (PROTONIX) 40 MG tablet Take 1 tablet (40 mg total) by mouth once daily. 30 tablet 0    promethazine (PHENERGAN) 12.5 MG Tab Take 1 tablet (12.5 mg total) by mouth every 4 (four) hours as needed (nausea/vomiting). 30 tablet 1    traZODone (DESYREL) 50 MG tablet Take 1 tablet by mouth in the evening 30 tablet 1     No current facility-administered medications for this visit.       Review of patient's allergies indicates:   Allergen Reactions    Ativan [lorazepam] Anxiety     Patient had paradoxical effect after ativan IV and was so agitated he had to be restrained and transferred to ICU for precedex.       ROS:  CONSTITUTIONAL: no fevers, no chills, + weight loss, no fatigue, no weakness  HEMATOLOGIC: no abnormal bleeding, no abnormal bruising, no drenching night sweats  ONCOLOGIC: no new masses or lumps  HEENT: no vision loss, no tinnitus or hearing loss, no nose bleeding, no dysphagia, no odynophagia  CVS: no chest pain, no palpitations, no dyspnea on exertion  RESP: no shortness of breath, no hemoptysis, no cough  GI: + nausea, no vomiting, no diarrhea, no constipation, no melena, no hematochezia, no hematemesis, + abdominal pain, no increase in abdominal girth  : no dysuria, no hematuria, no hesitancy, no scrotal swelling, no discharge  INTEGUMENT: no rashes, no abnormal bruising, no nail pitting, no hyperpigmentation  NEURO: no falls, no memory loss,  no paresthesias or dysesthesias, no urofecal incontinence or retention, no loss of strength on any extremity  MSK: no back pain, no new joint pain, no joint swelling  PSYCH: no suicidal or homicidal ideation, no depression, no insomnia, no anhedonia  ENDOCRINE: no heat or cold intolerance, no polyuria, no polydipsia    OBSERVATIONS:  ECOG PS 1-2  GENERAL: AAOx3, NAD, accompanied by his wife and daughter.  Bitemporal wasting  VS:  Afebrile.  104/77  HEENT: NCAT, PERRLA, EOMI, Poor dentition, moist oral mucous membranes, hard of hearing  LYMPH: no cervical, axillary or supraclavicular adenopathy  CVS: s1s2 RRR, no M/R/G  RESP: CTA b/l, no crackles, no wheezes or rhonchi  ABD: soft, NT, ND, BS+, no hepatosplenomegaly, healing midline incision with staples in place without surrounding edema, erythema or discharge.  EXT: no deformities, no pedal edema  SKIN: no rashes, warm and dry  NEURO:  Limited hearing acuity      ASSESSMENT:  Gastric adenocarcinoma, pT4 pN3aMx, poorly differentiated, SANJAY, HER2 negative,   -  Status post partial gastrectomy on 07/12/2022,  positive radial margins, 8/13 positive lymph nodes  - Advanced age, relatively fit ECOG 1-2  - PET-CT without evidence of distant metastatic disease  Postoperative adjuvant Chemo Rx/XRT with Xeloda, dose at 625 mg per m2 now completed.    PLAN:  Follow-up recheck in 8 weeks.  CBC and CMP on RTC.  Patient will be due for CT scan just prior to RTC.  If the CT scan is okay, we will need to get him set up for EGD afterward and try and get that done before the end of the year.      RAMANA CARDENAS M.D., FACP

## 2022-10-08 ENCOUNTER — LAB VISIT (OUTPATIENT)
Dept: LAB | Facility: HOSPITAL | Age: 87
End: 2022-10-08
Attending: INTERNAL MEDICINE
Payer: MEDICARE

## 2022-10-08 DIAGNOSIS — R31.9 HEMATURIA, UNSPECIFIED TYPE: ICD-10-CM

## 2022-10-08 LAB
BILIRUB UR QL STRIP: NEGATIVE
CLARITY UR: CLEAR
COLOR UR: YELLOW
GLUCOSE UR QL STRIP: NEGATIVE
HGB UR QL STRIP: NEGATIVE
KETONES UR QL STRIP: NEGATIVE
LEUKOCYTE ESTERASE UR QL STRIP: NEGATIVE
NITRITE UR QL STRIP: NEGATIVE
PH UR STRIP: 6 [PH] (ref 5–8)
PROT UR QL STRIP: NEGATIVE
SP GR UR STRIP: 1.01 (ref 1–1.03)
URN SPEC COLLECT METH UR: NORMAL
UROBILINOGEN UR STRIP-ACNC: 1 EU/DL

## 2022-10-08 PROCEDURE — 81003 URINALYSIS AUTO W/O SCOPE: CPT | Performed by: INTERNAL MEDICINE

## 2022-11-22 ENCOUNTER — OFFICE VISIT (OUTPATIENT)
Dept: HEMATOLOGY/ONCOLOGY | Facility: CLINIC | Age: 87
End: 2022-11-22
Payer: MEDICARE

## 2022-11-22 VITALS
OXYGEN SATURATION: 100 % | RESPIRATION RATE: 18 BRPM | HEART RATE: 95 BPM | BODY MASS INDEX: 21.16 KG/M2 | TEMPERATURE: 98 F | SYSTOLIC BLOOD PRESSURE: 119 MMHG | DIASTOLIC BLOOD PRESSURE: 82 MMHG | WEIGHT: 147.5 LBS

## 2022-11-22 DIAGNOSIS — K31.89: ICD-10-CM

## 2022-11-22 DIAGNOSIS — H92.09 EAR ACHE: Primary | ICD-10-CM

## 2022-11-22 PROCEDURE — 99214 PR OFFICE/OUTPT VISIT, EST, LEVL IV, 30-39 MIN: ICD-10-PCS | Mod: ,,, | Performed by: INTERNAL MEDICINE

## 2022-11-22 PROCEDURE — 99214 OFFICE O/P EST MOD 30 MIN: CPT | Mod: ,,, | Performed by: INTERNAL MEDICINE

## 2022-11-22 NOTE — PROGRESS NOTES
DATE:  11/22/2022    PROBLEM:  Gastric cancer.  Status post partial gastrectomy 07/2022.  Now receiving postoperative adjuvant chemo/XRT    HxPI:  86-year-old with no significant past medical history except for gastric ulcer status post surgery 40-50 years back and cholecystectomy who presented to clinic to establish care for new diagnosis of gastric adenocarcinoma.  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  Patient underwent CT abdomen pelvis with contrast without evidence of metastatic disease.  He was scheduled for a partial gastrectomy which he had on 07/12/2022, pathology from which revealed adenocarcinoma with neuroendocrine features, moderate to poorly differentiated, with positive radial margin, with invasion into the serosa and direct invasion the small intestine as well as 8 positive lymph nodes.  Patient has healed well from surgery and is back to his baseline in terms of his functional status.  He reports intermittent symptoms of nausea and vomiting however denies any change in his bowel habits.  He denies any further episodes of hemoptysis hematemesis, hematochezia or melena.  Patient reports 30 lb weight loss since January this year. He lives with his wife, has a son and a daughter who live close by.  Patient is hard of hearing.  He is independent with his ADLs and most office IADLs.  Patient's daughter tells me he cut grass day before yesterday.  Has a family history of lung cancer in his father and uterine cancer in his sister.  He is a former smoker and currently chews tobacco.  He used to work in an oil field currently retired.    INTERVAL Hx:  11/22/2022  Follow-up visit after completion of XRT.  Patient missed getting his post XRT CT scan of chest abdomen and pelvis which was supposed been done just prior to  this visit.  He is feeling okay at this time except for sensation of congestion involving his left ear with a reading in his ear.    IMAGIN2022 : PET-CT-moderate diffuse uptake at the suture line in the stomach which is probably inflammatory in etiology, however continued surveillance is recommended.  No convincing PET evidence of metastatic disease.  Mild uptake in the anterior left 4th and 5th rib which is favored being on a posttraumatic PC is however continue monitoring is recommended.  No other suspicious uptake    PATHOLOGY:  2022:   Partial gastrectomy:   Adenocarcinoma with neuroendocrine features, moderate to poorly   differentiated (4.5 cm) with invasion of the serosa and direct extension into   the small intestine   Radial margin involved by invasive carcinoma   Proximal and distal surgical margins negative for dysplasia or malignancy   Lymphovascular invasion present   Metastatic carcinoma identified in eight out of 13 lymph nodes (8/13),   supported by AE1/AE3 immunostains with adequate controls.   The largest metastatic deposit measures 3 mm in greatest linear dimension   No definitive extranodal extension   PATHOLOGIC TNM STAGING: [id number]   HER2 1+, negative.  Positive for synaptophysin      2022  Fragments of gastric non-oxyntic mucosa (submitted as stomach ulcerated mass   biopsy):   -Infiltrating, poorly differentiated carcinoma with neuroendocrine features   -Morphologic and immunohistochemical features are not specifically suggestive   of either a primary gastric adenocarcinoma or a neuroendocrine carcinoma,   although neither of those two possibilities can be excluded.  The possibility   of a carcinoma from another site extending or metastasizing to the stomach   also cannot be excluded.  Positive for AE1/AE3, Negative for CK 7, CK 20, CDX 2.  CEA focally positive within benign glands.  HER2 1+, weak positivity for synaptophysin but chromogranin is negative.    Full  interpretation requires correlation with   clinical, radiographic, and other findings.   -MMR by immunohistochemical stains shows normal, retained MLH1, MSH2, MSH6,   and PMS1     LABS:  09/06/2022:  Creatinine 0.84, albumin 3.8, calcium 9.4, alk-phos 100, total bili 1.4,  AST 14, ALT 8, WBC count 5.26, hemoglobin 12.4, MCV 84.4, platelet count 300, ANC 3.94.    Past Medical History:   Diagnosis Date    Cancer     gastric    Dizziness     Hypertension     Insomnia     Stomach ulcer     Tumor     removed from stomach       Past Surgical History:   Procedure Laterality Date    APPENDECTOMY      CHOLECYSTECTOMY      ESOPHAGOGASTRODUODENOSCOPY N/A 5/31/2022    Procedure: EGD (ESOPHAGOGASTRODUODENOSCOPY);  Surgeon: Mario Escalera MD;  Location: [facility] ENDO;  Service: Endoscopy;  Laterality: N/A;    GASTRECTOMY      GASTRECTOMY N/A 7/12/2022    Procedure: GASTRECTOMY;  Surgeon: Jordan Montesinos MD;  Location: [facility] OR;  Service: General;  Laterality: N/A;  RN PREOP 6/29/2022   T/S DAY OF SURGERY (LIVES 110 MILES AWAY)  HAS PCP CLEAR.    KNEE SURGERY      PARTIAL GASTRECTOMY      SHOULDER SURGERY Right        Family History   Problem Relation Age of Onset    No Known Problems Mother     No Known Problems Father     Hypertension Sister     Uterine cancer Sister     No Known Problems Brother     No Known Problems Maternal Grandmother     No Known Problems Maternal Grandfather     No Known Problems Paternal Grandmother     No Known Problems Paternal Grandfather        Social History     Socioeconomic History    Marital status:    Tobacco Use    Smoking status: Never    Smokeless tobacco: Current     Types: Chew   Substance and Sexual Activity    Alcohol use: Not Currently    Drug use: Never    Sexual activity: Not Currently       Current Outpatient Medications   Medication Sig Dispense Refill    capecitabine (XELODA) 150 MG tablet Take 1 tablet (150 mg total) by mouth 2 (two) times daily Days 1-5 every week with  radiation therapy with 1 other capecitabine prescription for 1,150 mg total. (Patient not taking: Reported on 10/7/2022.) 50 tablet 0    capecitabine (XELODA) 500 MG Tab Take 2 tablets (1,000 mg total) by mouth 2 (two) times daily Days 1-5 every week with radiation therapy with 1 other capecitabine prescription for 1,150 mg total. (Patient not taking: Reported on 10/7/2022.) 100 tablet 0    cyanocobalamin, vitamin B-12, 1,000 mcg Subl Place 1,000 mcg under the tongue once daily. 30 tablet 4    folic acid (FOLVITE) 1 MG tablet Take 1 tablet (1 mg total) by mouth once daily. 30 tablet 3    HYDROcodone-acetaminophen (NORCO) 5-325 mg per tablet Take 1 tablet by mouth every 6 (six) hours as needed for Pain. (Patient not taking: Reported on 10/7/2022) 30 tablet 0    ondansetron (ZOFRAN) 4 MG tablet Take 4 mg by mouth 4 (four) times daily as needed.      pantoprazole (PROTONIX) 40 MG tablet Take 1 tablet (40 mg total) by mouth once daily. 30 tablet 0    promethazine (PHENERGAN) 12.5 MG Tab Take 1 tablet (12.5 mg total) by mouth every 4 (four) hours as needed (nausea/vomiting). 30 tablet 1    traZODone (DESYREL) 50 MG tablet Take 1 tablet by mouth in the evening (Patient not taking: Reported on 10/7/2022) 30 tablet 1     No current facility-administered medications for this visit.       Review of patient's allergies indicates:   Allergen Reactions    Ativan [lorazepam] Anxiety     Patient had paradoxical effect after ativan IV and was so agitated he had to be restrained and transferred to ICU for precedex.       ROS:  CONSTITUTIONAL: no fevers, no chills, + weight loss, no fatigue, no weakness  HEMATOLOGIC: no abnormal bleeding, no abnormal bruising, no drenching night sweats  ONCOLOGIC: no new masses or lumps  HEENT:  See complaint of ringing in left ear.  CVS: no chest pain, no palpitations, no dyspnea on exertion  RESP: no shortness of breath, no hemoptysis, no cough  GI: + nausea, no vomiting, no diarrhea, no  constipation, no melena, no hematochezia, no hematemesis, + abdominal pain, no increase in abdominal girth  : no dysuria, no hematuria, no hesitancy, no scrotal swelling, no discharge  INTEGUMENT: no rashes, no abnormal bruising, no nail pitting, no hyperpigmentation  NEURO: no falls, no memory loss, no paresthesias or dysesthesias, no urofecal incontinence or retention, no loss of strength on any extremity  MSK: no back pain, no new joint pain, no joint swelling  PSYCH: no suicidal or homicidal ideation, no depression, no insomnia, no anhedonia  ENDOCRINE: no heat or cold intolerance, no polyuria, no polydipsia    OBSERVATIONS:  ECOG PS 1-2  GENERAL: AAOx3, NAD, accompanied by his wife and daughter.  Bitemporal wasting  VS:  Afebrile.  104/77  HEENT:  Pathology noted on otoscope as extensive amount of cerumen involving left canal  LYMPH: no cervical, axillary or supraclavicular adenopathy  CVS: s1s2 RRR, no M/R/G  RESP: CTA b/l, no crackles, no wheezes or rhonchi  ABD: soft, NT, ND, BS+, no hepatosplenomegaly, healing midline incision with staples in place without surrounding edema, erythema or discharge.  EXT: no deformities, no pedal edema  SKIN: no rashes, warm and dry  NEURO:  Limited hearing acuity      ASSESSMENT:  Gastric adenocarcinoma, pT4 pN3aMx, poorly differentiated, SANJAY, HER2 negative,   -  Status post partial gastrectomy on 07/12/2022,  positive radial margins, 8/13 positive lymph nodes  - Advanced age, relatively fit ECOG 1-2  - PET-CT without evidence of distant metastatic disease  Postoperative adjuvant Chemo Rx/XRT with Xeloda, dose at 625 mg per m2 now completed.    PLAN:  The patient is squared away for CT scan of chest abdomen and pelvis.  After that study is done patient will be due for repeat endoscopy studies.  ENT consult for ear discomfort.    RAMANA CARDENAS M.D., [facility]

## 2022-11-26 DIAGNOSIS — C16.9 MALIGNANT NEOPLASM OF STOMACH, UNSPECIFIED LOCATION: ICD-10-CM

## 2022-11-26 DIAGNOSIS — K21.00 GASTROESOPHAGEAL REFLUX DISEASE WITH ESOPHAGITIS WITHOUT HEMORRHAGE: Primary | ICD-10-CM

## 2022-12-23 ENCOUNTER — OFFICE VISIT (OUTPATIENT)
Dept: HEMATOLOGY/ONCOLOGY | Facility: CLINIC | Age: 87
End: 2022-12-23
Payer: MEDICARE

## 2022-12-23 VITALS
HEIGHT: 70 IN | HEART RATE: 62 BPM | SYSTOLIC BLOOD PRESSURE: 135 MMHG | RESPIRATION RATE: 18 BRPM | WEIGHT: 150.38 LBS | OXYGEN SATURATION: 100 % | BODY MASS INDEX: 21.53 KG/M2 | DIASTOLIC BLOOD PRESSURE: 85 MMHG | TEMPERATURE: 98 F

## 2022-12-23 DIAGNOSIS — C16.2 MALIGNANT NEOPLASM OF BODY OF STOMACH: Primary | ICD-10-CM

## 2022-12-23 PROCEDURE — 99214 PR OFFICE/OUTPT VISIT, EST, LEVL IV, 30-39 MIN: ICD-10-PCS | Mod: ,,, | Performed by: INTERNAL MEDICINE

## 2022-12-23 PROCEDURE — 99214 OFFICE O/P EST MOD 30 MIN: CPT | Mod: ,,, | Performed by: INTERNAL MEDICINE

## 2022-12-23 NOTE — PROGRESS NOTES
DATE:  12/23/2022    PROBLEM:  Gastric cancer.  Status post partial gastrectomy 07/2022.  Now receiving postoperative adjuvant chemo/XRT    HxPI:  86-year-old with no significant past medical history except for gastric ulcer status post surgery 40-50 years back and cholecystectomy who presented to clinic to establish care for new diagnosis of gastric adenocarcinoma.  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  Patient underwent CT abdomen pelvis with contrast without evidence of metastatic disease.  He was scheduled for a partial gastrectomy which he had on 07/12/2022, pathology from which revealed adenocarcinoma with neuroendocrine features, moderate to poorly differentiated, with positive radial margin, with invasion into the serosa and direct invasion the small intestine as well as 8 positive lymph nodes.  Patient has healed well from surgery and is back to his baseline in terms of his functional status.  He reports intermittent symptoms of nausea and vomiting however denies any change in his bowel habits.  He denies any further episodes of hemoptysis hematemesis, hematochezia or melena.  Patient reports 30 lb weight loss since January this year. He lives with his wife, has a son and a daughter who live close by.  Patient is hard of hearing.  He is independent with his ADLs and most office IADLs.  Patient's daughter tells me he cut grass day before yesterday.  Has a family history of lung cancer in his father and uterine cancer in his sister.  He is a former smoker and currently chews tobacco.  He used to work in an oil field currently retired.    INTERVAL Hx:  12/23/2022  Since last seen, patient had restaging scans and fortunately there is no evidence of any recurrent cancer.  He feels well as coming in for a [facility]  patients checkup.    IMAGIN2022:  CT scan C/A/P:  Negative study.  No evidence of recurrent cancer.  2022 : PET-CT-moderate diffuse uptake at the suture line in the stomach which is probably inflammatory in etiology, however continued surveillance is recommended.  No convincing PET evidence of metastatic disease.  Mild uptake in the anterior left 4th and 5th rib which is favored being on a posttraumatic PC is however continue monitoring is recommended.  No other suspicious uptake    PATHOLOGY:  2022:   Partial gastrectomy:   Adenocarcinoma with neuroendocrine features, moderate to poorly   differentiated (4.5 cm) with invasion of the serosa and direct extension into   the small intestine   Radial margin involved by invasive carcinoma   Proximal and distal surgical margins negative for dysplasia or malignancy   Lymphovascular invasion present   Metastatic carcinoma identified in eight out of 13 lymph nodes (8/13),   supported by AE1/AE3 immunostains with adequate controls.   The largest metastatic deposit measures 3 mm in greatest linear dimension   No definitive extranodal extension   PATHOLOGIC TNM STAGING: [id number]   HER2 1+, negative.  Positive for synaptophysin      2022  Fragments of gastric non-oxyntic mucosa (submitted as stomach ulcerated mass   biopsy):   -Infiltrating, poorly differentiated carcinoma with neuroendocrine features   -Morphologic and immunohistochemical features are not specifically suggestive   of either a primary gastric adenocarcinoma or a neuroendocrine carcinoma,   although neither of those two possibilities can be excluded.  The possibility   of a carcinoma from another site extending or metastasizing to the stomach   also cannot be excluded.  Positive for AE1/AE3, Negative for CK 7, CK 20, CDX 2.  CEA focally positive within benign glands.  HER2 1+, weak positivity for synaptophysin but chromogranin is negative.    Full interpretation requires correlation  with   clinical, radiographic, and other findings.   -MMR by immunohistochemical stains shows normal, retained MLH1, MSH2, MSH6,   and PMS1     LABS:  09/06/2022:  Creatinine 0.84, albumin 3.8, calcium 9.4, alk-phos 100, total bili 1.4,  AST 14, ALT 8, WBC count 5.26, hemoglobin 12.4, MCV 84.4, platelet count 300, ANC 3.94.    Past Medical History:   Diagnosis Date    Cancer     gastric    Dizziness     Hypertension     Insomnia     Stomach ulcer     Tumor     removed from stomach       Past Surgical History:   Procedure Laterality Date    APPENDECTOMY      CHOLECYSTECTOMY      ESOPHAGOGASTRODUODENOSCOPY N/A 5/31/2022    Procedure: EGD (ESOPHAGOGASTRODUODENOSCOPY);  Surgeon: Mario Escalera MD;  Location: [facility] ENDO;  Service: Endoscopy;  Laterality: N/A;    GASTRECTOMY      GASTRECTOMY N/A 7/12/2022    Procedure: GASTRECTOMY;  Surgeon: Jordan Montesinos MD;  Location: [facility] OR;  Service: General;  Laterality: N/A;  RN PREOP 6/29/2022   T/S DAY OF SURGERY (LIVES 110 MIL[place] AWAY)  HAS PCP CLEAR.    KNEE SURGERY      PARTIAL GASTRECTOMY      SHOULDER SURGERY Right        Family History   Problem Relation Age of Onset    No Known Problems Mother     No Known Problems Father     Hypertension Sister     Uterine cancer Sister     No Known Problems Brother     No Known Problems Maternal Grandmother     No Known Problems Maternal Grandfather     No Known Problems Paternal Grandmother     No Known Problems Paternal Grandfather        Social History     Socioeconomic History    Marital status:    Tobacco Use    Smoking status: Never    Smokeless tobacco: Current     Types: Chew   Substance and Sexual Activity    Alcohol use: Yes    Drug use: Never    Sexual activity: Not Currently       Current Outpatient Medications   Medication Sig Dispense Refill    predniSONE (DELTASONE) 20 MG tablet Take 2 tablets (40 mg total) by mouth once daily for 3 days, THEN 1 tablet (20 mg total) once daily for 4 days. 10 tablet 0     triamcinolone acetonide 0.1% (KENALOG) 0.1 % cream Apply topically 2 (two) times daily. 453.6 g 1     No current facility-administered medications for this visit.       Review of patient's allergies indicates:   Allergen Reactions    Ativan [lorazepam] Anxiety     Patient had paradoxical effect after ativan IV and was so agitated he had to be restrained and transferred to ICU for precedex.       ROS:  CONSTITUTIONAL: no fevers, no chills, + weight loss, no fatigue, no weakness  HEMATOLOGIC: no abnormal bleeding, no abnormal bruising, no drenching night sweats  ONCOLOGIC: no new masses or lumps  HEENT:  See complaint of ringing in left ear.  CVS: no chest pain, no palpitations, no dyspnea on exertion  RESP: no shortness of breath, no hemoptysis, no cough  GI: + nausea, no vomiting, no diarrhea, no constipation, no melena, no hematochezia, no hematemesis, + abdominal pain, no increase in abdominal girth  : no dysuria, no hematuria, no hesitancy, no scrotal swelling, no discharge  INTEGUMENT: no rashes, no abnormal bruising, no nail pitting, no hyperpigmentation  NEURO: no falls, no memory loss, no paresthesias or dysesthesias, no urofecal incontinence or retention, no loss of strength on any extremity  MSK: no back pain, no new joint pain, no joint swelling  PSYCH: no suicidal or homicidal ideation, no depression, no insomnia, no anhedonia  ENDOCRINE: no heat or cold intolerance, no polyuria, no polydipsia    OBSERVATIONS:  ECOG PS 1-2  GENERAL: AAOx3, NAD, accompanied by his wife and daughter.  Bitemporal wasting  VS:  Afebrile.  104/77  HEENT:  Pathology noted on otoscope as extensive amount of cerumen involving left canal  LYMPH: no cervical, axillary or supraclavicular adenopathy  CVS: s1s2 RRR, no M/R/G  RESP: CTA b/l, no crackles, no wheezes or rhonchi  ABD: soft, NT, ND, BS+, no hepatosplenomegaly, healing midline incision with staples in place without surrounding edema, erythema or discharge.  EXT: no  deformities, no pedal edema  SKIN: no rashes, warm and dry  NEURO:  Limited hearing acuity      ASSESSMENT:  Gastric adenocarcinoma, pT4 pN3aMx, poorly differentiated, SANJAY, HER2 negative,   -  Status post partial gastrectomy on 07/12/2022,  positive radial margins, 8/13 positive lymph nodes  - Advanced age, relatively fit ECOG 1-2  - PET-CT without evidence of distant metastatic disease  Postoperative adjuvant Chemo Rx/XRT with Xeloda, dose at 625 mg per m2 now completed.  Most recent imaging studies confirms patients in a solid remission.    PLAN:  Will have patient follow up with us at three-month intervals until the 2 year joel from time of diagnosis and then if OK, stretch out visits to six-month intervals until year 5.  We will get CBC and CMP with [facility] on RTC.    RAMANA CARDENAS M.D., FACP

## 2023-01-01 ENCOUNTER — HOSPITAL ENCOUNTER (EMERGENCY)
Facility: HOSPITAL | Age: 88
Discharge: HOME | End: 2023-06-22
Attending: STUDENT IN AN ORGANIZED HEALTH CARE EDUCATION/TRAINING PROGRAM
Payer: MEDICARE

## 2023-01-01 ENCOUNTER — HOSPITAL ENCOUNTER (EMERGENCY)
Facility: HOSPITAL | Age: 88
Discharge: HOME | End: 2023-07-17
Attending: EMERGENCY MEDICINE
Payer: MEDICARE

## 2023-01-01 ENCOUNTER — HOSPITAL ENCOUNTER (OUTPATIENT)
Dept: RADIOLOGY | Facility: HOSPITAL | Age: 88
Discharge: HOME | End: 2023-12-06
Payer: MEDICARE

## 2023-01-01 ENCOUNTER — OFFICE VISIT (OUTPATIENT)
Dept: HEMATOLOGY/ONCOLOGY | Facility: CLINIC | Age: 88
End: 2023-01-01
Payer: MEDICARE

## 2023-01-01 ENCOUNTER — PATIENT MESSAGE (OUTPATIENT)
Dept: ADMINISTRATIVE | Facility: OTHER | Age: 88
End: 2023-01-01
Payer: MEDICARE

## 2023-01-01 VITALS
TEMPERATURE: 98 F | HEIGHT: 70 IN | BODY MASS INDEX: 20.76 KG/M2 | HEART RATE: 89 BPM | DIASTOLIC BLOOD PRESSURE: 76 MMHG | SYSTOLIC BLOOD PRESSURE: 122 MMHG | WEIGHT: 145 LBS | OXYGEN SATURATION: 96 % | RESPIRATION RATE: 18 BRPM

## 2023-01-01 VITALS
WEIGHT: 147.88 LBS | OXYGEN SATURATION: 98 % | TEMPERATURE: 97 F | HEIGHT: 70 IN | RESPIRATION RATE: 20 BRPM | SYSTOLIC BLOOD PRESSURE: 115 MMHG | HEART RATE: 68 BPM | BODY MASS INDEX: 21.17 KG/M2 | DIASTOLIC BLOOD PRESSURE: 77 MMHG

## 2023-01-01 VITALS
HEIGHT: 70 IN | HEART RATE: 99 BPM | OXYGEN SATURATION: 99 % | BODY MASS INDEX: 20.76 KG/M2 | TEMPERATURE: 98 F | SYSTOLIC BLOOD PRESSURE: 130 MMHG | WEIGHT: 145 LBS | DIASTOLIC BLOOD PRESSURE: 91 MMHG | RESPIRATION RATE: 18 BRPM

## 2023-01-01 DIAGNOSIS — R11.2 NAUSEA AND VOMITING, UNSPECIFIED VOMITING TYPE: ICD-10-CM

## 2023-01-01 DIAGNOSIS — R25.1 TREMOR: ICD-10-CM

## 2023-01-01 DIAGNOSIS — S22.42XA CLOSED FRACTURE OF MULTIPLE RIBS OF LEFT SIDE, INITIAL ENCOUNTER: ICD-10-CM

## 2023-01-01 DIAGNOSIS — M25.511 ACUTE SHOULDER PAIN DUE TO TRAUMA, RIGHT: ICD-10-CM

## 2023-01-01 DIAGNOSIS — R07.81 RIB PAIN: ICD-10-CM

## 2023-01-01 DIAGNOSIS — S20.211A SUPERFICIAL BRUISING OF CHEST WALL, RIGHT, INITIAL ENCOUNTER: ICD-10-CM

## 2023-01-01 DIAGNOSIS — R53.83 FATIGUE, UNSPECIFIED TYPE: Primary | ICD-10-CM

## 2023-01-01 DIAGNOSIS — S41.111A LACERATION OF RIGHT UPPER EXTREMITY, INITIAL ENCOUNTER: ICD-10-CM

## 2023-01-01 DIAGNOSIS — C16.2 MALIGNANT NEOPLASM OF BODY OF STOMACH: Primary | ICD-10-CM
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
## 2023-01-01 DIAGNOSIS — G89.11 ACUTE SHOULDER PAIN DUE TO TRAUMA, RIGHT: ICD-10-CM

## 2023-01-01 LAB
ALBUMIN SERPL BCP-MCNC: 3.2 G/DL (ref 3.5–5.2)
ALP SERPL-CCNC: 170 U/L (ref 55–135)
ALT SERPL W/O P-5'-P-CCNC: 29 U/L (ref 10–44)
ANION GAP SERPL CALC-SCNC: 8 MMOL/L (ref 8–16)
AST SERPL-CCNC: 50 U/L (ref 10–40)
BACTERIA #/AREA URNS HPF: NEGATIVE /HPF
BASOPHILS # BLD AUTO: 0.05 K/UL (ref 0–0.2)
BASOPHILS NFR BLD: 1 % (ref 0–1.9)
BILIRUB SERPL-MCNC: 2.4 MG/DL (ref 0.1–1)
BILIRUB UR QL STRIP: NEGATIVE
BUN SERPL-MCNC: 12 MG/DL (ref 8–23)
CALCIUM SERPL-MCNC: 8.9 MG/DL (ref 8.7–10.5)
CHLORIDE SERPL-SCNC: 107 MMOL/L (ref 95–110)
CLARITY UR: ABNORMAL
CO2 SERPL-SCNC: 28 MMOL/L (ref 23–29)
COLOR UR: YELLOW
CREAT SERPL-MCNC: 1 MG/DL (ref 0.5–1.4)
DIFFERENTIAL METHOD: ABNORMAL
EOSINOPHIL # BLD AUTO: 0 K/UL (ref 0–0.5)
EOSINOPHIL NFR BLD: 0.4 % (ref 0–8)
ERYTHROCYTE [DISTWIDTH] IN BLOOD BY AUTOMATED COUNT: 14 % (ref 11.5–14.5)
EST. GFR  (NO RACE VARIABLE): >60 ML/MIN/1.73 M^2
GLUCOSE SERPL-MCNC: 125 MG/DL (ref 70–110)
GLUCOSE UR QL STRIP: NEGATIVE
HCT VFR BLD AUTO: 40 % (ref 40–54)
HGB BLD-MCNC: 13.7 G/DL (ref 14–18)
HGB UR QL STRIP: NEGATIVE
HYALINE CASTS #/AREA URNS LPF: 1.6 /LPF
IMM GRANULOCYTES # BLD AUTO: 0.02 K/UL (ref 0–0.04)
IMM GRANULOCYTES NFR BLD AUTO: 0.4 % (ref 0–0.5)
KETONES UR QL STRIP: NEGATIVE
LEUKOCYTE ESTERASE UR QL STRIP: NEGATIVE
LIPASE SERPL-CCNC: 63 U/L (ref 23–300)
LYMPHOCYTES # BLD AUTO: 0.4 K/UL (ref 1–4.8)
LYMPHOCYTES NFR BLD: 7.3 % (ref 18–48)
MCH RBC QN AUTO: 36.1 PG (ref 27–31)
MCHC RBC AUTO-ENTMCNC: 34.3 G/DL (ref 32–36)
MCV RBC AUTO: 106 FL (ref 82–98)
MICROSCOPIC COMMENT: ABNORMAL
MONOCYTES # BLD AUTO: 0.4 K/UL (ref 0.3–1)
MONOCYTES NFR BLD: 7.7 % (ref 4–15)
NEUTROPHILS # BLD AUTO: 4.1 K/UL (ref 1.8–7.7)
NEUTROPHILS NFR BLD: 83.2 % (ref 38–73)
NITRITE UR QL STRIP: NEGATIVE
NRBC BLD-RTO: 0 /100 WBC
PH UR STRIP: >8 [PH] (ref 5–8)
PLATELET # BLD AUTO: 123 K/UL (ref 150–450)
PMV BLD AUTO: 9.5 FL (ref 9.2–12.9)
POTASSIUM SERPL-SCNC: 3.9 MMOL/L (ref 3.5–5.1)
PROT SERPL-MCNC: 6.9 G/DL (ref 6–8.4)
PROT UR QL STRIP: ABNORMAL
RBC # BLD AUTO: 3.79 M/UL (ref 4.6–6.2)
RBC #/AREA URNS HPF: 3 /HPF (ref 0–4)
SODIUM SERPL-SCNC: 143 MMOL/L (ref 136–145)
SP GR UR STRIP: 1.01 (ref 1–1.03)
SQUAMOUS #/AREA URNS HPF: 1 /HPF
URN SPEC COLLECT METH UR: ABNORMAL
UROBILINOGEN UR STRIP-ACNC: ABNORMAL EU/DL
WBC # BLD AUTO: 4.93 K/UL (ref 3.9–12.7)
WBC #/AREA URNS HPF: 1 /HPF (ref 0–5)

## 2023-01-01 PROCEDURE — 25000003 PHARM REV CODE 250

## 2023-01-01 PROCEDURE — 99284 EMERGENCY DEPT VISIT MOD MDM: CPT

## 2023-01-01 PROCEDURE — 99284 EMERGENCY DEPT VISIT MOD MDM: CPT | Mod: 25

## 2023-01-01 PROCEDURE — 96361 HYDRATE IV INFUSION ADD-ON: CPT

## 2023-01-01 PROCEDURE — 90714 TD VACC NO PRESV 7 YRS+ IM: CPT

## 2023-01-01 PROCEDURE — 99215 OFFICE O/P EST HI 40 MIN: CPT | Mod: ,,,

## 2023-01-01 PROCEDURE — 83690 ASSAY OF LIPASE: CPT | Performed by: EMERGENCY MEDICINE

## 2023-01-01 PROCEDURE — 96374 THER/PROPH/DIAG INJ IV PUSH: CPT

## 2023-01-01 PROCEDURE — 71046 X-RAY EXAM CHEST 2 VIEWS: CPT | Mod: TC

## 2023-01-01 PROCEDURE — 36415 COLL VENOUS BLD VENIPUNCTURE: CPT | Performed by: EMERGENCY MEDICINE

## 2023-01-01 PROCEDURE — 63600175 PHARM REV CODE 636 W HCPCS

## 2023-01-01 PROCEDURE — 25000003 PHARM REV CODE 250: Performed by: EMERGENCY MEDICINE

## 2023-01-01 PROCEDURE — 90471 IMMUNIZATION ADMIN: CPT

## 2023-01-01 PROCEDURE — 81000 URINALYSIS NONAUTO W/SCOPE: CPT | Performed by: EMERGENCY MEDICINE

## 2023-01-01 PROCEDURE — 63600175 PHARM REV CODE 636 W HCPCS: Performed by: EMERGENCY MEDICINE

## 2023-01-01 PROCEDURE — 96375 TX/PRO/DX INJ NEW DRUG ADDON: CPT

## 2023-01-01 PROCEDURE — 80053 COMPREHEN METABOLIC PANEL: CPT | Performed by: EMERGENCY MEDICINE

## 2023-01-01 PROCEDURE — 12001 RPR S/N/AX/GEN/TRNK 2.5CM/<: CPT

## 2023-01-01 PROCEDURE — 73030 X-RAY EXAM OF SHOULDER: CPT | Mod: TC,RT

## 2023-01-01 PROCEDURE — 85025 COMPLETE CBC W/AUTO DIFF WBC: CPT | Performed by: EMERGENCY MEDICINE

## 2023-01-01 PROCEDURE — 99215 PR OFFICE/OUTPT VISIT, EST, LEVL V, 40-54 MIN: ICD-10-PCS | Mod: ,,,

## 2023-01-01 RX ORDER — MUPIROCIN 20 MG/G
OINTMENT TOPICAL 3 TIMES DAILY
Qty: 22 G | Refills: 0 | Status: SHIPPED | OUTPATIENT
Start: 2023-01-01 | End: 2023-01-01 | Stop reason: SDUPTHER

## 2023-01-01 RX ORDER — ACETAMINOPHEN 325 MG/1
650 TABLET ORAL
Status: COMPLETED | OUTPATIENT
Start: 2023-01-01 | End: 2023-01-01

## 2023-01-01 RX ORDER — LIDOCAINE 50 MG/G
1 PATCH TOPICAL DAILY
Qty: 5 PATCH | Refills: 0 | Status: SHIPPED | OUTPATIENT
Start: 2023-01-01 | End: 2023-01-01

## 2023-01-01 RX ORDER — ONDANSETRON 4 MG/1
4 TABLET, FILM COATED ORAL
COMMUNITY
Start: 2023-01-08 | End: 2023-01-01 | Stop reason: SDUPTHER

## 2023-01-01 RX ORDER — HYDROCODONE BITARTRATE AND ACETAMINOPHEN 7.5; 325 MG/1; MG/1
1 TABLET ORAL EVERY 6 HOURS PRN
COMMUNITY
Start: 2023-02-22 | End: 2023-01-01

## 2023-01-01 RX ORDER — LIDOCAINE HYDROCHLORIDE 10 MG/ML
1 INJECTION INFILTRATION; PERINEURAL
Status: COMPLETED | OUTPATIENT
Start: 2023-01-01 | End: 2023-01-01

## 2023-01-01 RX ORDER — FAMOTIDINE 10 MG/ML
20 INJECTION INTRAVENOUS
Status: COMPLETED | OUTPATIENT
Start: 2023-01-01 | End: 2023-01-01

## 2023-01-01 RX ORDER — ONDANSETRON 4 MG/1
4 TABLET, FILM COATED ORAL EVERY 8 HOURS PRN
Qty: 30 TABLET | Refills: 3 | Status: SHIPPED | OUTPATIENT
Start: 2023-01-01

## 2023-01-01 RX ORDER — ONDANSETRON 2 MG/ML
4 INJECTION INTRAMUSCULAR; INTRAVENOUS
Status: COMPLETED | OUTPATIENT
Start: 2023-01-01 | End: 2023-01-01

## 2023-01-01 RX ORDER — ONDANSETRON 4 MG/1
4 TABLET, FILM COATED ORAL EVERY 6 HOURS PRN
Qty: 12 TABLET | Refills: 0 | Status: SHIPPED | OUTPATIENT
Start: 2023-01-01

## 2023-01-01 RX ORDER — AMOXICILLIN AND CLAVULANATE POTASSIUM 875; 125 MG/1; MG/1
1 TABLET, FILM COATED ORAL 2 TIMES DAILY
Qty: 14 TABLET | Refills: 0 | Status: SHIPPED | OUTPATIENT
Start: 2023-01-01 | End: 2023-01-01 | Stop reason: ALTCHOICE

## 2023-01-01 RX ADMIN — BACITRACIN ZINC, NEOMYCIN, POLYMYXIN B 1 EACH: 400; 3.5; 5 OINTMENT TOPICAL at 12:06

## 2023-01-01 RX ADMIN — CLOSTRIDIUM TETANI TOXOID ANTIGEN (FORMALDEHYDE INACTIVATED) AND CORYNEBACTERIUM DIPHTHERIAE TOXOID ANTIGEN (FORMALDEHYDE INACTIVATED) 0.5 ML: 5; 2 INJECTION, SUSPENSION INTRAMUSCULAR at 12:06

## 2023-01-01 RX ADMIN — ACETAMINOPHEN 650 MG: 325 TABLET ORAL at 12:06

## 2023-01-01 RX ADMIN — FAMOTIDINE 20 MG: 10 INJECTION INTRAVENOUS at 12:07

## 2023-01-01 RX ADMIN — LIDOCAINE HYDROCHLORIDE 1 ML: 10 INJECTION, SOLUTION INFILTRATION; PERINEURAL at 12:06

## 2023-01-01 RX ADMIN — ONDANSETRON 4 MG: 2 INJECTION INTRAMUSCULAR; INTRAVENOUS at 12:07

## 2023-01-01 RX ADMIN — SODIUM CHLORIDE 1000 ML: 9 INJECTION, SOLUTION INTRAVENOUS at 12:07

## 2023-02-01 ENCOUNTER — HOSPITAL ENCOUNTER (OUTPATIENT)
Dept: RADIOLOGY | Facility: HOSPITAL | Age: 88
Discharge: HOME | End: 2023-02-01
Payer: MEDICARE

## 2023-02-01 DIAGNOSIS — M25.552 ACUTE HIP PAIN, LEFT: ICD-10-CM

## 2023-02-01 PROCEDURE — 73502 X-RAY EXAM HIP UNI 2-3 VIEWS: CPT | Mod: TC,LT

## 2023-02-08 PROBLEM — S72.042A: Status: ACTIVE | Noted: 2023-02-08

## 2023-02-08 PROBLEM — Z00.00 WELLNESS EXAMINATION: Status: ACTIVE | Noted: 2023-02-08

## 2023-03-24 NOTE — PROGRESS NOTES
DATE:  12/23/2022    PROBLEM:  Gastric cancer.  Status post partial gastrectomy 07/2022.  Now receiving postoperative adjuvant chemo/XRT    HxPI:  86-year-old with no significant past medical history except for gastric ulcer status post surgery 40-50 years back and cholecystectomy who presented to clinic to establish care for new diagnosis of gastric adenocarcinoma.  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  Patient underwent CT abdomen pelvis with contrast without evidence of metastatic disease.  He was scheduled for a partial gastrectomy which he had on 07/12/2022, pathology from which revealed adenocarcinoma with neuroendocrine features, moderate to poorly differentiated, with positive radial margin, with invasion into the serosa and direct invasion the small intestine as well as 8 positive lymph nodes.  Patient has healed well from surgery and is back to his baseline in terms of his functional status.  He reports intermittent symptoms of nausea and vomiting however denies any change in his bowel habits.  He denies any further episodes of hemoptysis hematemesis, hematochezia or melena.  Patient reports 30 lb weight loss since January this year. He lives with his wife, has a son and a daughter who live close by.  Patient is hard of hearing.  He is independent with his ADLs and most office IADLs.  Patient's daughter tells me he cut grass day before yesterday.  Has a family history of lung cancer in his father and uterine cancer in his sister.  He is a former smoker and currently chews tobacco.  He used to work in an oil field currently retired.    INTERVAL Hx:  03/24/23- patient returns to clinic with family. He reports breaking his left hip 2 months ago. He has completed PT and ambulates with rolator. He has not had f/u with  Rad/onc due to surgery for hip. He is doing well and denies bleeding issues, n/v/d/c, abdominal pain/bloating.       IMAGIN2022:  CT scan C/A/P:  Negative study.  No evidence of recurrent cancer.  2022 : PET-CT-moderate diffuse uptake at the suture line in the stomach which is probably inflammatory in etiology, however continued surveillance is recommended.  No convincing PET evidence of metastatic disease.  Mild uptake in the anterior left 4th and 5th rib which is favored being on a posttraumatic PC is however continue monitoring is recommended.  No other suspicious uptake    PATHOLOGY:  2022:   Partial gastrectomy:   Adenocarcinoma with neuroendocrine features, moderate to poorly   differentiated (4.5 cm) with invasion of the serosa and direct extension into   the small intestine   Radial margin involved by invasive carcinoma   Proximal and distal surgical margins negative for dysplasia or malignancy   Lymphovascular invasion present   Metastatic carcinoma identified in eight out of 13 lymph nodes (8/13),   supported by AE1/AE3 immunostains with adequate controls.   The largest metastatic deposit measures 3 mm in greatest linear dimension   No definitive extranodal extension   PATHOLOGIC TNM STAGING: [id number]   HER2 1+, negative.  Positive for synaptophysin      2022  Fragments of gastric non-oxyntic mucosa (submitted as stomach ulcerated mass   biopsy):   -Infiltrating, poorly differentiated carcinoma with neuroendocrine features   -Morphologic and immunohistochemical features are not specifically suggestive   of either a primary gastric adenocarcinoma or a neuroendocrine carcinoma,   although neither of those two possibilities can be excluded.  The possibility   of a carcinoma from another site extending or metastasizing to the stomach   also cannot be excluded.  Positive for AE1/AE3, Negative for CK 7, CK 20, CDX 2.  CEA focally positive within benign glands.  HER2 1+, weak positivity  for synaptophysin but chromogranin is negative.    Full interpretation requires correlation with   clinical, radiographic, and other findings.   -MMR by immunohistochemical stains shows normal, retained MLH1, MSH2, MSH6,   and PMS1     LABS:  03/2023 cr 0.85, TB 1.4, alkphos 160, LFTs WNL, wbc 4.05, hgb 14.5, plt 165, ANC 2.39  09/06/2022:  Creatinine 0.84, albumin 3.8, calcium 9.4, alk-phos 100, total bili 1.4,  AST 14, ALT 8, WBC count 5.26, hemoglobin 12.4, MCV 84.4, platelet count 300, ANC 3.94.    Past Medical History:   Diagnosis Date    Cancer     gastric    Dizziness     Encounter for blood transfusion     GI bleed     Hard of hearing     Hypertension     Insomnia     Personal history of fall     Stomach ulcer     Transfusion reaction     Tumor     removed from stomach       Past Surgical History:   Procedure Laterality Date    APPENDECTOMY      CHOLECYSTECTOMY      CLOSED REDUCTION OF INJURY OF HIP Left 02/09/2023    Procedure: CLOSED REDUCTION, HIP, LEFT;  Surgeon: Ck Collado MD;  Location: [facility] OR;  Service: Orthopedics;  Laterality: Left;    ESOPHAGOGASTRODUODENOSCOPY N/A 05/31/2022    Procedure: EGD (ESOPHAGOGASTRODUODENOSCOPY);  Surgeon: Mario Escalera MD;  Location: [facility];  Service: Endoscopy;  Laterality: N/A;    GASTRECTOMY      GASTRECTOMY N/A 07/12/2022    Procedure: GASTRECTOMY;  Surgeon: Jordan Montesinos MD;  Location: [facility] OR;  Service: General;  Laterality: N/A;  RN PREOP 6/29/2022   T/S DAY OF SURGERY (LIVES 110 MILES AWAY)  HAS PCP CLEAR.    KNEE SURGERY      PARTIAL GASTRECTOMY      PERCUTANEOUS PINNING OF FRACTURE Left 02/09/2023    Procedure: PINNING, FRACTURE, PERCUTANEOUS;  Surgeon: Ck Collado MD;  Location: [facility] OR;  Service: Orthopedics;  Laterality: Left;    SHOULDER SURGERY Right        Family History   Problem Relation Age of Onset    No Known Problems Mother     No Known Problems Father     Hypertension Sister     Uterine cancer Sister     No Known Problems Brother      No Known Problems Maternal Grandmother     No Known Problems Maternal Grandfather     No Known Problems Paternal Grandmother     No Known Problems Paternal Grandfather        Social History     Socioeconomic History    Marital status:    Tobacco Use    Smoking status: Never    Smokeless tobacco: Current     Types: Chew   Substance and Sexual Activity    Alcohol use: Yes     Alcohol/week: 2.0 standard drinks     Types: 2 Shots of liquor per week    Drug use: Never    Sexual activity: Not Currently       Current Outpatient Medications   Medication Sig Dispense Refill    HYDROcodone-acetaminophen (NORCO) 7.5-325 mg per tablet Take 1 tablet by mouth every 6 (six) hours as needed.      ondansetron (ZOFRAN) 4 MG tablet Take 4 mg by mouth.      diclofenac sodium (VOLTAREN) 1 % Gel Apply 2 g topically 4 (four) times daily as needed (pain). (Patient not taking: Reported on 3/24/2023) 450 g 2    pantoprazole (PROTONIX) 40 MG tablet Take 1 tablet (40 mg total) by mouth once daily. (Patient not taking: Reported on 3/24/2023) 30 tablet O    rivaroxaban (XARELTO) 10 mg Tab Take 1 tablet (10 mg total) by mouth daily with dinner or evening meal. 28 tablet 0    triamcinolone acetonide 0.1% (KENALOG) 0.1 % cream Apply topically 2 (two) times daily. (Patient not taking: Reported on 3/24/2023) 453.6 g 1     No current facility-administered medications for this visit.       Review of patient's allergies indicates:   Allergen Reactions    Ativan [lorazepam] Anxiety     Patient had paradoxical effect after ativan IV and was so agitated he had to be restrained and transferred to ICU for precedex.       ROS:  CONSTITUTIONAL: no fevers, no chills, + weight loss, no fatigue, no weakness  HEMATOLOGIC: no abnormal bleeding, no abnormal bruising, no drenching night sweats  ONCOLOGIC: no new masses or lumps  HEENT:  See complaint of ringing in left ear.  CVS: no chest pain, no palpitations, no dyspnea on exertion  RESP: no shortness of  "breath, no hemoptysis, no cough  GI: + nausea, no vomiting, no diarrhea, no constipation, no melena, no hematochezia, no hematemesis, + abdominal pain, no increase in abdominal girth  : no dysuria, no hematuria, no hesitancy, no scrotal swelling, no discharge  INTEGUMENT: no rashes, no abnormal bruising, no nail pitting, no hyperpigmentation  NEURO: no falls, no memory loss, no paresthesias or dysesthesias, no urofecal incontinence or retention, no loss of strength on any extremity  MSK: no back pain, no new joint pain, no joint swelling  PSYCH: no suicidal or homicidal ideation, no depression, no insomnia, no anhedonia  ENDOCRINE: no heat or cold intolerance, no polyuria, no polydipsia    Blood pressure 115/77, pulse 68, temperature 97.4 °F (36.3 °C), temperature source Oral, resp. rate 20, height 5' 10" (1.778 m), weight 67.1 kg (147 lb 14.4 oz), SpO2 98 %.     OBSERVATIONS:  ECOG PS 1-2  GENERAL: AAOx3, NAD, accompanied by his wife and daughter.  Bitemporal wasting  VS:  Afebrile.  104/77  HEENT:  Pathology noted on otoscope as extensive amount of cerumen involving left canal  LYMPH: no cervical, axillary or supraclavicular adenopathy  CVS: s1s2 RRR, no M/R/G  RESP: CTA b/l, no crackles, no wheezes or rhonchi  ABD: soft, NT, ND, BS+, no hepatosplenomegaly, healing midline incision with staples in place without surrounding edema, erythema or discharge.  EXT: no deformities, no pedal edema  SKIN: no rashes, warm and dry  NEURO:  Limited hearing acuity      ASSESSMENT:  Gastric adenocarcinoma, pT4 pN3aMx, poorly differentiated, SANJAY, HER2 negative,   -  Status post partial gastrectomy on 07/12/2022,  positive radial margins, 8/13 positive lymph nodes  - Advanced age, relatively fit ECOG 1-2  - PET-CT without evidence of distant metastatic disease  Postoperative adjuvant Chemo Rx/XRT with Xeloda, dose at 625 mg per m2 now completed.  Most recent imaging studies confirms patients in a solid remission.    PLAN:  RTC 3 " months w/ CBC and CMP  and repeat CT CAP  F/u with Rad/Onc

## 2023-05-15 PROBLEM — Z00.00 WELLNESS EXAMINATION: Status: RESOLVED | Noted: 2023-02-08 | Resolved: 2023-01-01

## 2023-06-22 NOTE — DISCHARGE INSTRUCTIONS
Keep your wound clean and dry.  Apply antibiotic ointment to 2 3 times a day with dressing changes.  Your sutures need removed in 7-10 days.  You have 4 stitches in place.  Take the antibiotics twice a day for 7 days to prevent infection.  You can use the lidocaine patches for 12 hours at a time with 12 hours off in between patches.  You can also apply ice your ribs for pain.  You can take Tylenol as needed for pain.  Do not bind her tape anything around your chest or ribs.  Squeeze or hug a pillow if you need to cough.  Follow up with your primary care provider symptoms do not improve.

## 2023-06-22 NOTE — ED PROVIDER NOTES
Encounter Date: 6/22/2023       History     Chief Complaint   Patient presents with    Animal Bite     Was trying to break up a fight between his dogs and got bit.  C/o multiple bites to right foream.  Dogs are UTD with vaccines     87-year-old male with history of hypertension, gastric CA presents to ED for evaluation of wounds to his right and left forearms.  Reports drinking about fight between his dogs when he was bitten.  He cleaned wounds at home, however his wound started to bleed again.  Additionally he complains of left-sided rib pain from a fall a couple of days ago.  No medications taken for pain.  Denies any chest pain or shortness a breath.    The history is provided by the patient.   Review of patient's allergies indicates:   Allergen Reactions    Ativan [lorazepam] Anxiety     Patient had paradoxical effect after ativan IV and was so agitated he had to be restrained and transferred to ICU for precedex.     Past Medical History:   Diagnosis Date    Cancer     gastric    Dizziness     Encounter for blood transfusion     GI bleed     Hard of hearing     Hypertension     Insomnia     Personal history of fall     Stomach ulcer     Transfusion reaction     Tumor     removed from stomach     Past Surgical History:   Procedure Laterality Date    APPENDECTOMY      CHOLECYSTECTOMY      CLOSED REDUCTION OF INJURY OF HIP Left 02/09/2023    Procedure: CLOSED REDUCTION, HIP, LEFT;  Surgeon: Ck Collado MD;  Location: [facility] OR;  Service: Orthopedics;  Laterality: Left;    ESOPHAGOGASTRODUODENOSCOPY N/A 05/31/2022    Procedure: EGD (ESOPHAGOGASTRODUODENOSCOPY);  Surgeon: Mario Escalera MD;  Location: [facility] ENDO;  Service: Endoscopy;  Laterality: N/A;    GASTRECTOMY      GASTRECTOMY N/A 07/12/2022    Procedure: GASTRECTOMY;  Surgeon: Jordan Montesinos MD;  Location: [facility] OR;  Service: General;  Laterality: N/A;  RN PREOP 6/29/2022   T/S DAY OF SURGERY (LIVES 110 MILES AWAY)  HAS PCP CLEAR.    KNEE SURGERY       PARTIAL GASTRECTOMY      PERCUTANEOUS PINNING OF FRACTURE Left 02/09/2023    Procedure: PINNING, FRACTURE, PERCUTANEOUS;  Surgeon: Ck Collado MD;  Location: [facility];  Service: Orthopedics;  Laterality: Left;    SHOULDER SURGERY Right      Family History   Problem Relation Age of Onset    No Known Problems Mother     No Known Problems Father     Hypertension Sister     Uterine cancer Sister     No Known Problems Brother     No Known Problems Maternal Grandmother     No Known Problems Maternal Grandfather     No Known Problems Paternal Grandmother     No Known Problems Paternal Grandfather      Social History     Tobacco Use    Smoking status: Never    Smokeless tobacco: Current     Types: Chew   Substance Use Topics    Alcohol use: Yes     Alcohol/week: 2.0 standard drinks     Types: 2 Shots of liquor per week    Drug use: Never     Review of Systems   Constitutional:  Negative for fever.   HENT:  Negative for sore throat.    Eyes: Negative.    Respiratory:  Negative for shortness of breath.    Cardiovascular:  Negative for chest pain.   Gastrointestinal:  Negative for nausea.   Endocrine: Negative.    Genitourinary:  Negative for dysuria.   Musculoskeletal:  Negative for back pain.        Left-sided rib pain   Skin:  Positive for wound. Negative for rash.   Allergic/Immunologic: Negative.    Neurological:  Negative for weakness.   Hematological:  Does not bruise/bleed easily.   Psychiatric/Behavioral: Negative.       Physical Exam     Initial Vitals [06/22/23 1134]   BP Pulse Resp Temp SpO2   (!) 130/91 99 18 98.1 °F (36.7 °C) 99 %      MAP       --         Physical Exam    Nursing note and vitals reviewed.  Constitutional: He appears well-developed and well-nourished.   HENT:   Head: Normocephalic and atraumatic.   Eyes: EOM are normal.   Neck: Neck supple.   Normal range of motion.  Cardiovascular:  Normal rate and regular rhythm.           Pulmonary/Chest: Breath sounds normal. No respiratory distress.    Abdominal: Abdomen is soft. He exhibits no distension. There is no abdominal tenderness.   Musculoskeletal:         General: Tenderness (Left-sided ribs) present. Normal range of motion.      Cervical back: Normal range of motion and neck supple.     Neurological: He is alert and oriented to person, place, and time.   Skin: Skin is warm and dry.        Psychiatric: He has a normal mood and affect. Thought content normal.       ED Course   Lac Repair    Date/Time: 6/22/2023 1:22 PM  Performed by: Isma Rizvi NP  Authorized by: Sim Bauer MD     Consent:     Consent obtained:  Verbal    Consent given by:  Patient    Risks, benefits, and alternatives were discussed: yes      Risks discussed:  Pain, poor cosmetic result, poor wound healing and infection    Alternatives discussed:  No treatment  Universal protocol:     Procedure explained and questions answered to patient or proxy's satisfaction: yes      Patient identity confirmed:  Verbally with patient  Anesthesia:     Anesthesia method:  Local infiltration    Local anesthetic:  Lidocaine 1% w/o epi  Laceration details:     Location:  Shoulder/arm    Shoulder/arm location:  R lower arm    Length (cm):  2  Pre-procedure details:     Preparation:  Patient was prepped and draped in usual sterile fashion  Exploration:     Limited defect created (wound extended): yes      Hemostasis achieved with:  Direct pressure    Imaging outcome: foreign body not noted      Wound exploration: wound explored through full range of motion and entire depth of wound visualized      Contaminated: no    Treatment:     Area cleansed with:  Povidone-iodine and saline    Amount of cleaning:  Standard    Irrigation solution:  Sterile saline    Irrigation volume:  500    Irrigation method:  Pressure wash    Debridement:  None    Undermining:  None  Skin repair:     Repair method:  Sutures    Suture size:  4-0    Suture material:  Prolene    Suture technique:  Simple interrupted     Number of sutures:  4  Approximation:     Approximation:  Loose  Repair type:     Repair type:  Simple  Post-procedure details:     Dressing:  Antibiotic ointment and non-adherent dressing    Procedure completion:  Tolerated well, no immediate complications  Labs Reviewed - No data to display       Imaging Results              X-Ray Chest AP Portable (Final result)  Result time 06/22/23 12:21:42      Final result by Roslyn Vernon MD (06/22/23 12:21:42)                   Impression:      No acute lung disease      Electronically signed by: Roslyn Vernon MD  Date:    06/22/2023  Time:    12:21               Narrative:    EXAMINATION:  XR CHEST AP PORTABLE    CLINICAL HISTORY:  Pleurodynia    TECHNIQUE:  portable chest x-ray    COMPARISON:  04/21/2015.    FINDINGS:  Aorta is ectatic.  No acute infiltrates or effusions                                       X-Ray Ribs 2 View Left (Final result)  Result time 06/22/23 12:20:49      Final result by Roslyn Vernon MD (06/22/23 12:20:49)                   Impression:      Subtle cortical fracture of the anterolateral left approximately 9th rib refer to CT of the chest from June 20th 2023 which showed fractures of the left 7th to 9th ribs      Electronically signed by: Roslyn Vernon MD  Date:    06/22/2023  Time:    12:20               Narrative:    EXAMINATION:  XR RIBS 2 VIEW LEFT    CLINICAL HISTORY:  Pleurodynia    COMPARISON:  None    FINDINGS:  There is a fracture of an anterior left approximately 9th and 10th ribs.  No significant displacement                                    X-Rays:   Independently Interpreted Readings:   Other Readings:  Nondisplaced rib fractures  Medications   tetanus-diphther toxoids vaccine (PF) (TENIVAC) injection (0.5 mLs Intramuscular Given 6/22/23 1207)   neomycin-bacitracnZn-polymyxnB packet (1 each Topical (Top) Given 6/22/23 1208)   LIDOcaine HCL 10 mg/ml (1%) injection 1 mL (1 mL Infiltration Given 6/22/23 1207)   acetaminophen tablet  650 mg (650 mg Oral Given 6/22/23 1207)     Medical Decision Making:   History:   Old Medical Records: I decided to obtain old medical records.  Differential Diagnosis:   Dog bite, laceration, abrasions, skin tears, rib fractures  Clinical Tests:   Radiological Study: Ordered and Reviewed  ED Management:  87-year-old male to ED for above complaints.  He was attempting to break up a fight between his dogs when he was bitten.  He was multiple superficial lacerations noted to his right forearm.  He had 1 skin flap which I loosely closed with 4 stitches.  His wounds were cleaned and antibiotic ointment was stress.  His tetanus also updated.  Patient reported multiple falls and was complaining of left rib pain.  X-rays were obtained and showed to nondisplaced rib fractures.  I will send him home with lidocaine patches.  He was also instructed to take Tylenol as needed for pain.  Patient was requesting narcotic pain medicine, however patient reported multiple falls therefore I did not feel he should be given narcotics.  He was instructed to follow up with primary care for further management of pain symptoms.  He was given wound care instructions.  I will also start him on Augmentin and Bactroban for empiric treatment of a dog bite.  Strict return precautions were given.  Patient was stable for discharge.  He was instructed to follow up in 7-10 days for suture removal and wound recheck.                        Clinical Impression:   Final diagnoses:  [R07.81] Rib pain  [W54.0XXA] Dog bite, initial encounter (Primary)  [S41.111A] Laceration of right upper extremity, initial encounter  [S22.42XA] Closed fracture of multiple ribs of left side, initial encounter        ED Disposition Condition    Discharge Stable          ED Prescriptions       Medication Sig Dispense Start Date End Date Auth. Provider    LIDOcaine (LIDODERM) 5 % Place 1 patch onto the skin once daily. Remove & Discard patch within 12 hours or as directed by MD  for 5 days 5 patch 6/22/2023 6/27/2023 Isma Rizvi NP    mupirocin (BACTROBAN) 2 % ointment Apply topically 3 (three) times daily. 22 g 6/22/2023 -- Isma Rizvi NP    amoxicillin-clavulanate 875-125mg (AUGMENTIN) 875-125 mg per tablet Take 1 tablet by mouth 2 (two) times daily. 14 tablet 6/22/2023 -- Isma Rizvi NP          Follow-up Information       Follow up With Specialties Details Why Contact Info Additional Information    Caroline Florez MD Internal Medicine  For suture removal in 7-10 days [address] [place] [id number]  [phone]       [facility] Emergency Medicine  For suture removal in 7-10 days [address] [place] [id number] [phone] Floor [address]             Isma Rizvi NP  06/22/23 3901

## 2023-07-17 NOTE — ED PROVIDER NOTES
Encounter Date: 7/17/2023       History     Chief Complaint   Patient presents with    Fatigue     Pt presents to the ER w/ complaints of fatigue and vomiting x 2 nights. Pt denies any complaints of pain. Daughter also reports concern over uncontrollable shaking.      88 yo male with history as below here via POV with complaint of N/V, fatigue and tremor x 2 nights. No cough. No congestion. No abd pain. No diarrhea. No headache. No neck pain. No dyspnea. No urinary symptoms.     Review of patient's allergies indicates:   Allergen Reactions    Ativan [lorazepam] Anxiety     Patient had paradoxical effect after ativan IV and was so agitated he had to be restrained and transferred to ICU for precedex.     Past Medical History:   Diagnosis Date    Cancer     gastric    Dizziness     Encounter for blood transfusion     GI bleed     Hard of hearing     Hypertension     Insomnia     Personal history of fall     Stomach ulcer     Transfusion reaction     Tumor     removed from stomach     Past Surgical History:   Procedure Laterality Date    APPENDECTOMY      CHOLECYSTECTOMY      CLOSED REDUCTION OF INJURY OF HIP Left 02/09/2023    Procedure: CLOSED REDUCTION, HIP, LEFT;  Surgeon: Ck Collado MD;  Location: [facility] OR;  Service: Orthopedics;  Laterality: Left;    ESOPHAGOGASTRODUODENOSCOPY N/A 05/31/2022    Procedure: EGD (ESOPHAGOGASTRODUODENOSCOPY);  Surgeon: Mario Escalera MD;  Location: [facility];  Service: Endoscopy;  Laterality: N/A;    GASTRECTOMY      GASTRECTOMY N/A 07/12/2022    Procedure: GASTRECTOMY;  Surgeon: Jordan Montesinos MD;  Location: [facility] OR;  Service: General;  Laterality: N/A;  RN PREOP 6/29/2022   T/S DAY OF SURGERY (LIVES 110 MILES AWAY)  HAS PCP CLEAR.    KNEE SURGERY      PARTIAL GASTRECTOMY      PERCUTANEOUS PINNING OF FRACTURE Left 02/09/2023    Procedure: PINNING, FRACTURE, PERCUTANEOUS;  Surgeon: Ck Collado MD;  Location: [facility] OR;  Service: Orthopedics;  Laterality: Left;    SHOULDER  SURGERY Right      Family History   Problem Relation Age of Onset    No Known Problems Mother     No Known Problems Father     Hypertension Sister     Uterine cancer Sister     No Known Problems Brother     No Known Problems Maternal Grandmother     No Known Problems Maternal Grandfather     No Known Problems Paternal Grandmother     No Known Problems Paternal Grandfather      Social History     Tobacco Use    Smoking status: Never    Smokeless tobacco: Current     Types: Chew   Substance Use Topics    Alcohol use: Yes     Alcohol/week: 2.0 standard drinks     Types: 2 Shots of liquor per week    Drug use: Never     Review of Systems   Constitutional:  Positive for fatigue.   HENT: Negative.     Respiratory: Negative.     Cardiovascular: Negative.    Gastrointestinal:  Positive for nausea and vomiting.   Neurological:  Positive for tremors.   All other systems reviewed and are negative.    Physical Exam     Initial Vitals [07/17/23 0021]   BP Pulse Resp Temp SpO2   (!) 159/96 101 18 97.5 °F (36.4 °C) 95 %      MAP       --         Physical Exam    Nursing note and vitals reviewed.  Constitutional: He is not diaphoretic. No distress.   HENT:   Head: Normocephalic and atraumatic.   Eyes: EOM are normal. Pupils are equal, round, and reactive to light.   Neck: Neck supple.   Normal range of motion.  Cardiovascular:  Normal rate, regular rhythm and intact distal pulses.           Pulmonary/Chest: Breath sounds normal. No respiratory distress. He has no wheezes. He has no rales.   Abdominal: Abdomen is soft. Bowel sounds are normal. He exhibits no distension. There is no abdominal tenderness. There is no rebound and no guarding.   Musculoskeletal:         General: No tenderness or edema. Normal range of motion.      Cervical back: Normal range of motion and neck supple.     Neurological: He is alert. GCS score is 15. GCS eye subscore is 4. GCS verbal subscore is 5. GCS motor subscore is 6.   Skin: Skin is warm and dry.  Capillary refill takes less than 2 seconds.   Psychiatric: He has a normal mood and affect. Thought content normal.       ED Course   Procedures  Labs Reviewed   CBC W/ AUTO DIFFERENTIAL - Abnormal; Notable for the following components:       Result Value    RBC 3.79 (*)     Hemoglobin 13.7 (*)      (*)     MCH 36.1 (*)     Platelets 123 (*)     Lymph # 0.4 (*)     Gran % 83.2 (*)     Lymph % 7.3 (*)     All other components within normal limits    Narrative:     Recoll. [id number] by NLFARIHA at 07/17/2023 01:22, reason: CLOTTED   COMPREHENSIVE METABOLIC PANEL - Abnormal; Notable for the following components:    Glucose 125 (*)     Albumin 3.2 (*)     Total Bilirubin 2.4 (*)     Alkaline Phosphatase 170 (*)     AST 50 (*)     All other components within normal limits   URINALYSIS, REFLEX TO URINE CULTURE - Abnormal; Notable for the following components:    Appearance, UA Cloudy (*)     pH, UA >8.0 (*)     Protein, UA 1+ (*)     Urobilinogen, UA 2.0-3.0 (*)     All other components within normal limits    Narrative:     Preferred Collection Type->Urine, Clean Catch  Specimen Source->Urine   URINALYSIS MICROSCOPIC - Abnormal; Notable for the following components:    Hyaline Casts, UA 1.6 (*)     All other components within normal limits    Narrative:     Preferred Collection Type->Urine, Clean Catch  Specimen Source->Urine   LIPASE          Imaging Results    None          Medications   sodium chloride 0.9% bolus 1,000 mL 1,000 mL (1,000 mLs Intravenous New Bag 7/17/23 0059)   ondansetron injection 4 mg (4 mg Intravenous Given 7/17/23 0059)   famotidine (PF) injection 20 mg (20 mg Intravenous Given 7/17/23 0059)     Medical Decision Making:   Differential Diagnosis:   Viral illness, gastritis, dehydration, UTI  Clinical Tests:   Lab Tests: Ordered and Reviewed  ED Management:  Labs largely at baseline. No fever. No leukocytosis. Nausea controlled with antiemetics. No sign of obstruction. Passing flatus, having  BMs. Stable for d/c home.                         Clinical Impression:   Final diagnoses:  [R53.83] Fatigue, unspecified type (Primary)  [R25.1] Tremor  [R11.2] Nausea and vomiting, unspecified vomiting type        ED Disposition Condition    Discharge Stable          ED Prescriptions       Medication Sig Dispense Start Date End Date Auth. Provider    ondansetron (ZOFRAN) 4 MG tablet Take 1 tablet (4 mg total) by mouth every 6 (six) hours as needed. 12 tablet 7/17/2023 -- Austin Carlton MD          Follow-up Information       Follow up With Specialties Details Why Contact Info    Caroline Florez MD Internal Medicine Schedule an appointment as soon as possible for a visit   [address] [place] [id number]  [phone]               Austin Carlton MD  07/17/23 3793

## 2023-07-18 PROBLEM — F41.9 ANXIETY: Status: ACTIVE | Noted: 2023-01-01

## 2023-07-18 PROBLEM — R79.89 ELEVATED LFTS: Status: ACTIVE | Noted: 2023-01-01

## 2023-11-02 PROBLEM — L03.114 CELLULITIS OF LEFT UPPER EXTREMITY: Status: ACTIVE | Noted: 2023-01-01

## 2023-12-07 PROBLEM — R29.6 FREQUENT FALLS: Status: ACTIVE | Noted: 2023-01-01

## 2023-12-07 PROBLEM — G89.11 ACUTE SHOULDER PAIN DUE TO TRAUMA, RIGHT: Status: ACTIVE | Noted: 2023-01-01

## 2023-12-07 PROBLEM — M25.511 ACUTE SHOULDER PAIN DUE TO TRAUMA, RIGHT: Status: ACTIVE | Noted: 2023-01-01

## 2024-01-01 ENCOUNTER — OFFICE VISIT (OUTPATIENT)
Dept: HEMATOLOGY/ONCOLOGY | Facility: CLINIC | Age: 89
End: 2024-01-01
Payer: MEDICARE

## 2024-01-01 ENCOUNTER — HOSPITAL ENCOUNTER (INPATIENT)
Facility: HOSPITAL | Age: 89
LOS: 1 days | DRG: 951 | End: 2024-03-07
Attending: INTERNAL MEDICINE | Admitting: INTERNAL MEDICINE
Payer: MEDICARE

## 2024-01-01 ENCOUNTER — LAB VISIT (OUTPATIENT)
Dept: LAB | Facility: HOSPITAL | Age: 89
End: 2024-01-01
Payer: MEDICARE

## 2024-01-01 ENCOUNTER — HOSPITAL ENCOUNTER (EMERGENCY)
Facility: HOSPITAL | Age: 89
Discharge: HOME | End: 2024-02-09
Attending: EMERGENCY MEDICINE
Payer: MEDICARE

## 2024-01-01 ENCOUNTER — HOSPITAL ENCOUNTER (INPATIENT)
Facility: HOSPITAL | Age: 89
LOS: 4 days | Discharge: HOSPICE | DRG: 641 | End: 2024-03-06
Attending: EMERGENCY MEDICINE | Admitting: INTERNAL MEDICINE
Payer: MEDICARE

## 2024-01-01 ENCOUNTER — LAB VISIT (OUTPATIENT)
Dept: LAB | Facility: HOSPITAL | Age: 89
End: 2024-01-01
Attending: INTERNAL MEDICINE
Payer: MEDICARE

## 2024-01-01 VITALS
TEMPERATURE: 98 F | SYSTOLIC BLOOD PRESSURE: 131 MMHG | OXYGEN SATURATION: 98 % | HEIGHT: 70 IN | WEIGHT: 135 LBS | BODY MASS INDEX: 19.33 KG/M2 | RESPIRATION RATE: 20 BRPM | HEART RATE: 89 BPM | DIASTOLIC BLOOD PRESSURE: 83 MMHG

## 2024-01-01 VITALS
HEIGHT: 70 IN | OXYGEN SATURATION: 94 % | SYSTOLIC BLOOD PRESSURE: 146 MMHG | HEART RATE: 82 BPM | BODY MASS INDEX: 19.9 KG/M2 | RESPIRATION RATE: 18 BRPM | DIASTOLIC BLOOD PRESSURE: 93 MMHG | TEMPERATURE: 98 F | WEIGHT: 139 LBS

## 2024-01-01 VITALS
TEMPERATURE: 98 F | DIASTOLIC BLOOD PRESSURE: 58 MMHG | WEIGHT: 128.5 LBS | RESPIRATION RATE: 18 BRPM | OXYGEN SATURATION: 94 % | HEART RATE: 106 BPM | BODY MASS INDEX: 18.4 KG/M2 | SYSTOLIC BLOOD PRESSURE: 92 MMHG | HEIGHT: 70 IN

## 2024-01-01 VITALS
SYSTOLIC BLOOD PRESSURE: 119 MMHG | RESPIRATION RATE: 20 BRPM | OXYGEN SATURATION: 81 % | DIASTOLIC BLOOD PRESSURE: 85 MMHG | TEMPERATURE: 99 F | HEART RATE: 117 BPM

## 2024-01-01 DIAGNOSIS — C16.2 MALIGNANT NEOPLASM OF BODY OF STOMACH: Primary | ICD-10-CM

## 2024-01-01 DIAGNOSIS — Z51.5 PALLIATIVE CARE ENCOUNTER: ICD-10-CM

## 2024-01-01 DIAGNOSIS — R74.8 ELEVATED SERUM ALKALINE PHOSPHATASE LEVEL: ICD-10-CM

## 2024-01-01 DIAGNOSIS — R39.12 WEAK URINARY STREAM: ICD-10-CM

## 2024-01-01 DIAGNOSIS — R82.998 LEUKOCYTES IN URINE: ICD-10-CM

## 2024-01-01 DIAGNOSIS — E86.0 DEHYDRATION: ICD-10-CM

## 2024-01-01 DIAGNOSIS — R82.998 DARK BROWN URINE: ICD-10-CM

## 2024-01-01 DIAGNOSIS — R10.84 GENERALIZED ABDOMINAL PAIN: ICD-10-CM

## 2024-01-01 DIAGNOSIS — R11.2 NAUSEA AND VOMITING, UNSPECIFIED VOMITING TYPE: Primary | ICD-10-CM

## 2024-01-01 DIAGNOSIS — R10.9 ABDOMINAL PAIN: Primary | ICD-10-CM

## 2024-01-01 DIAGNOSIS — C16.2 MALIGNANT NEOPLASM OF BODY OF STOMACH: ICD-10-CM

## 2024-01-01 DIAGNOSIS — Z12.5 PROSTATE CANCER SCREENING: ICD-10-CM

## 2024-01-01 DIAGNOSIS — Z51.11 ENCOUNTER FOR ANTINEOPLASTIC CHEMOTHERAPY: ICD-10-CM

## 2024-01-01 DIAGNOSIS — Z51.5 HOSPICE CARE: ICD-10-CM

## 2024-01-01 LAB
ALBUMIN SERPL BCP-MCNC: 1.9 G/DL (ref 3.5–5.2)
ALBUMIN SERPL BCP-MCNC: 2 G/DL (ref 3.5–5.2)
ALBUMIN SERPL BCP-MCNC: 2 G/DL (ref 3.5–5.2)
ALBUMIN SERPL BCP-MCNC: 2.4 G/DL (ref 3.5–5.2)
ALBUMIN SERPL BCP-MCNC: 2.6 G/DL (ref 3.5–5.2)
ALBUMIN SERPL BCP-MCNC: 2.8 G/DL (ref 3.5–5.2)
ALP SERPL-CCNC: 103 U/L (ref 55–135)
ALP SERPL-CCNC: 113 U/L (ref 55–135)
ALP SERPL-CCNC: 114 U/L (ref 55–135)
ALP SERPL-CCNC: 128 U/L (ref 55–135)
ALP SERPL-CCNC: 176 U/L (ref 55–135)
ALP SERPL-CCNC: 87 U/L (ref 55–135)
ALP SERPL-CCNC: 94 U/L (ref 55–135)
ALP SERPL-CCNC: 95 U/L (ref 55–135)
ALT SERPL W/O P-5'-P-CCNC: 10 U/L (ref 10–44)
ALT SERPL W/O P-5'-P-CCNC: 11 U/L (ref 10–44)
ALT SERPL W/O P-5'-P-CCNC: 11 U/L (ref 10–44)
ALT SERPL W/O P-5'-P-CCNC: 15 U/L (ref 10–44)
ALT SERPL W/O P-5'-P-CCNC: 9 U/L (ref 10–44)
ANION GAP SERPL CALC-SCNC: 11 MMOL/L (ref 3–11)
ANION GAP SERPL CALC-SCNC: 12 MMOL/L (ref 3–11)
ANION GAP SERPL CALC-SCNC: 5 MMOL/L (ref 3–11)
ANION GAP SERPL CALC-SCNC: 5 MMOL/L (ref 3–11)
ANION GAP SERPL CALC-SCNC: 6 MMOL/L (ref 3–11)
ANION GAP SERPL CALC-SCNC: 7 MMOL/L (ref 3–11)
ANION GAP SERPL CALC-SCNC: 9 MMOL/L (ref 3–11)
ANION GAP SERPL CALC-SCNC: 9 MMOL/L (ref 3–11)
AST SERPL-CCNC: 11 U/L (ref 10–40)
AST SERPL-CCNC: 13 U/L (ref 10–40)
AST SERPL-CCNC: 14 U/L (ref 10–40)
AST SERPL-CCNC: 16 U/L (ref 10–40)
AST SERPL-CCNC: 16 U/L (ref 10–40)
AST SERPL-CCNC: 18 U/L (ref 10–40)
AST SERPL-CCNC: 24 U/L (ref 10–40)
AST SERPL-CCNC: 8 U/L (ref 10–40)
BACTERIA UR CULT: ABNORMAL
BASOPHILS # BLD AUTO: 0.01 K/UL (ref 0–0.2)
BASOPHILS # BLD AUTO: 0.03 K/UL (ref 0–0.2)
BASOPHILS # BLD AUTO: 0.05 K/UL (ref 0–0.2)
BASOPHILS # BLD AUTO: 0.05 K/UL (ref 0–0.2)
BASOPHILS # BLD AUTO: 0.06 K/UL (ref 0–0.2)
BASOPHILS NFR BLD: 0 % (ref 0–1.9)
BASOPHILS NFR BLD: 0.1 % (ref 0–1.9)
BASOPHILS NFR BLD: 0.2 % (ref 0–1.9)
BASOPHILS NFR BLD: 0.4 % (ref 0–1.9)
BASOPHILS NFR BLD: 0.5 % (ref 0–1.9)
BASOPHILS NFR BLD: 0.5 % (ref 0–1.9)
BASOPHILS NFR BLD: 0.6 % (ref 0–1.9)
BASOPHILS NFR BLD: 1.1 % (ref 0–1.9)
BILIRUB SERPL-MCNC: 0.7 MG/DL (ref 0.1–1)
BILIRUB SERPL-MCNC: 0.8 MG/DL (ref 0.1–1)
BILIRUB SERPL-MCNC: 1 MG/DL (ref 0.1–1)
BILIRUB SERPL-MCNC: 1.7 MG/DL (ref 0.1–1)
BILIRUB UR QL STRIP: NEGATIVE
BUN SERPL-MCNC: 15 MG/DL (ref 8–23)
BUN SERPL-MCNC: 18 MG/DL (ref 8–23)
BUN SERPL-MCNC: 23 MG/DL (ref 8–23)
BUN SERPL-MCNC: 23 MG/DL (ref 8–23)
BUN SERPL-MCNC: 26 MG/DL (ref 8–23)
BUN SERPL-MCNC: 36 MG/DL (ref 8–23)
CALCIUM SERPL-MCNC: 10.2 MG/DL (ref 8.7–10.5)
CALCIUM SERPL-MCNC: 8.2 MG/DL (ref 8.7–10.5)
CALCIUM SERPL-MCNC: 8.5 MG/DL (ref 8.7–10.5)
CALCIUM SERPL-MCNC: 8.5 MG/DL (ref 8.7–10.5)
CALCIUM SERPL-MCNC: 8.7 MG/DL (ref 8.7–10.5)
CALCIUM SERPL-MCNC: 8.8 MG/DL (ref 8.7–10.5)
CALCIUM SERPL-MCNC: 9.1 MG/DL (ref 8.7–10.5)
CALCIUM SERPL-MCNC: 9.4 MG/DL (ref 8.7–10.5)
CHLORIDE SERPL-SCNC: 103 MMOL/L (ref 95–110)
CHLORIDE SERPL-SCNC: 106 MMOL/L (ref 95–110)
CHLORIDE SERPL-SCNC: 110 MMOL/L (ref 95–110)
CHLORIDE SERPL-SCNC: 113 MMOL/L (ref 95–110)
CHLORIDE SERPL-SCNC: 116 MMOL/L (ref 95–110)
CHLORIDE SERPL-SCNC: 118 MMOL/L (ref 95–110)
CHLORIDE SERPL-SCNC: 119 MMOL/L (ref 95–110)
CHLORIDE SERPL-SCNC: 120 MMOL/L (ref 95–110)
CK SERPL-CCNC: 25 U/L (ref 20–200)
CLARITY UR: CLEAR
CO2 SERPL-SCNC: 15 MMOL/L (ref 23–29)
CO2 SERPL-SCNC: 17 MMOL/L (ref 23–29)
CO2 SERPL-SCNC: 19 MMOL/L (ref 23–29)
CO2 SERPL-SCNC: 20 MMOL/L (ref 23–29)
CO2 SERPL-SCNC: 22 MMOL/L (ref 23–29)
CO2 SERPL-SCNC: 24 MMOL/L (ref 23–29)
CO2 SERPL-SCNC: 25 MMOL/L (ref 23–29)
CO2 SERPL-SCNC: 29 MMOL/L (ref 23–29)
COLOR UR: YELLOW
COMPLEXED PSA SERPL-MCNC: 2.7 NG/ML (ref 0–4)
CREAT SERPL-MCNC: 0.8 MG/DL (ref 0.5–1.4)
CREAT SERPL-MCNC: 0.9 MG/DL (ref 0.5–1.4)
CREAT SERPL-MCNC: 1 MG/DL (ref 0.5–1.4)
CREAT SERPL-MCNC: 1 MG/DL (ref 0.5–1.4)
CREAT SERPL-MCNC: 1.2 MG/DL (ref 0.5–1.4)
CREAT SERPL-MCNC: 1.2 MG/DL (ref 0.5–1.4)
CREAT SERPL-MCNC: 1.3 MG/DL (ref 0.5–1.4)
CREAT SERPL-MCNC: 1.9 MG/DL (ref 0.5–1.4)
DIFFERENTIAL METHOD BLD: ABNORMAL
EOSINOPHIL # BLD AUTO: 0 K/UL (ref 0–0.5)
EOSINOPHIL # BLD AUTO: 0 K/UL (ref 0–0.5)
EOSINOPHIL # BLD AUTO: 0.1 K/UL (ref 0–0.5)
EOSINOPHIL NFR BLD: 0 % (ref 0–8)
EOSINOPHIL NFR BLD: 0 % (ref 0–8)
EOSINOPHIL NFR BLD: 0.2 % (ref 0–8)
EOSINOPHIL NFR BLD: 0.5 % (ref 0–8)
EOSINOPHIL NFR BLD: 1 % (ref 0–8)
EOSINOPHIL NFR BLD: 1.2 % (ref 0–8)
EOSINOPHIL NFR BLD: 2.2 % (ref 0–8)
EOSINOPHIL NFR BLD: 2.4 % (ref 0–8)
ERYTHROCYTE [DISTWIDTH] IN BLOOD BY AUTOMATED COUNT: 12.8 % (ref 11.5–14.5)
ERYTHROCYTE [DISTWIDTH] IN BLOOD BY AUTOMATED COUNT: 12.8 % (ref 11.5–14.5)
ERYTHROCYTE [DISTWIDTH] IN BLOOD BY AUTOMATED COUNT: 13.1 % (ref 11.5–14.5)
ERYTHROCYTE [DISTWIDTH] IN BLOOD BY AUTOMATED COUNT: 13.1 % (ref 11.5–14.5)
ERYTHROCYTE [DISTWIDTH] IN BLOOD BY AUTOMATED COUNT: 13.2 % (ref 11.5–14.5)
ERYTHROCYTE [DISTWIDTH] IN BLOOD BY AUTOMATED COUNT: 13.2 % (ref 11.5–14.5)
ERYTHROCYTE [DISTWIDTH] IN BLOOD BY AUTOMATED COUNT: 13.4 % (ref 11.5–14.5)
ERYTHROCYTE [DISTWIDTH] IN BLOOD BY AUTOMATED COUNT: 13.8 % (ref 11.5–14.5)
EST. GFR  (NO RACE VARIABLE): 33.9 ML/MIN/1.73 M^2
EST. GFR  (NO RACE VARIABLE): 53.5 ML/MIN/1.73 M^2
EST. GFR  (NO RACE VARIABLE): 58.9 ML/MIN/1.73 M^2
EST. GFR  (NO RACE VARIABLE): 58.9 ML/MIN/1.73 M^2
EST. GFR  (NO RACE VARIABLE): >60 ML/MIN/1.73 M^2
ETHANOL SERPL-MCNC: <3 MG/DL
GIANT PLATELETS BLD QL SMEAR: PRESENT
GLUCOSE SERPL-MCNC: 100 MG/DL (ref 70–110)
GLUCOSE SERPL-MCNC: 107 MG/DL (ref 70–110)
GLUCOSE SERPL-MCNC: 107 MG/DL (ref 70–110)
GLUCOSE SERPL-MCNC: 77 MG/DL (ref 70–110)
GLUCOSE SERPL-MCNC: 78 MG/DL (ref 70–110)
GLUCOSE SERPL-MCNC: 79 MG/DL (ref 70–110)
GLUCOSE SERPL-MCNC: 90 MG/DL (ref 70–110)
GLUCOSE SERPL-MCNC: 94 MG/DL (ref 70–110)
GLUCOSE UR QL STRIP: NEGATIVE
HCT VFR BLD AUTO: 38.6 % (ref 40–54)
HCT VFR BLD AUTO: 39.6 % (ref 40–54)
HCT VFR BLD AUTO: 41.2 % (ref 40–54)
HCT VFR BLD AUTO: 41.2 % (ref 40–54)
HCT VFR BLD AUTO: 42.8 % (ref 40–54)
HCT VFR BLD AUTO: 42.9 % (ref 40–54)
HCT VFR BLD AUTO: 45.2 % (ref 40–54)
HCT VFR BLD AUTO: 46.1 % (ref 40–54)
HGB BLD-MCNC: 12.6 G/DL (ref 14–18)
HGB BLD-MCNC: 12.7 G/DL (ref 14–18)
HGB BLD-MCNC: 13.3 G/DL (ref 14–18)
HGB BLD-MCNC: 13.6 G/DL (ref 14–18)
HGB BLD-MCNC: 13.6 G/DL (ref 14–18)
HGB BLD-MCNC: 13.9 G/DL (ref 14–18)
HGB BLD-MCNC: 14.6 G/DL (ref 14–18)
HGB BLD-MCNC: 15.1 G/DL (ref 14–18)
HGB UR QL STRIP: ABNORMAL
IMM GRANULOCYTES # BLD AUTO: 0.01 K/UL (ref 0–0.04)
IMM GRANULOCYTES # BLD AUTO: 0.02 K/UL (ref 0–0.04)
IMM GRANULOCYTES # BLD AUTO: 0.02 K/UL (ref 0–0.04)
IMM GRANULOCYTES # BLD AUTO: 0.04 K/UL (ref 0–0.04)
IMM GRANULOCYTES # BLD AUTO: 0.04 K/UL (ref 0–0.04)
IMM GRANULOCYTES # BLD AUTO: 0.06 K/UL (ref 0–0.04)
IMM GRANULOCYTES # BLD AUTO: 0.13 K/UL (ref 0–0.04)
IMM GRANULOCYTES # BLD AUTO: ABNORMAL K/UL (ref 0–0.04)
IMM GRANULOCYTES NFR BLD AUTO: 0.2 % (ref 0–0.5)
IMM GRANULOCYTES NFR BLD AUTO: 0.3 % (ref 0–0.5)
IMM GRANULOCYTES NFR BLD AUTO: 0.4 % (ref 0–0.5)
IMM GRANULOCYTES NFR BLD AUTO: 0.5 % (ref 0–0.5)
IMM GRANULOCYTES NFR BLD AUTO: 0.8 % (ref 0–0.5)
IMM GRANULOCYTES NFR BLD AUTO: ABNORMAL % (ref 0–0.5)
KETONES UR QL STRIP: ABNORMAL
LEUKOCYTE ESTERASE UR QL STRIP: NEGATIVE
LIPASE SERPL-CCNC: 12 U/L (ref 13–75)
LIPASE SERPL-CCNC: 13 U/L (ref 13–75)
LYMPHOCYTES # BLD AUTO: 0.2 K/UL (ref 1–4.8)
LYMPHOCYTES # BLD AUTO: 0.4 K/UL (ref 1–4.8)
LYMPHOCYTES # BLD AUTO: 0.6 K/UL (ref 1–4.8)
LYMPHOCYTES NFR BLD: 1.4 % (ref 18–48)
LYMPHOCYTES NFR BLD: 13.1 % (ref 18–48)
LYMPHOCYTES NFR BLD: 2 % (ref 18–48)
LYMPHOCYTES NFR BLD: 3.4 % (ref 18–48)
LYMPHOCYTES NFR BLD: 4.3 % (ref 18–48)
LYMPHOCYTES NFR BLD: 6.7 % (ref 18–48)
LYMPHOCYTES NFR BLD: 8 % (ref 18–48)
LYMPHOCYTES NFR BLD: 9.2 % (ref 18–48)
MAGNESIUM SERPL-MCNC: 2.1 MG/DL (ref 1.6–2.6)
MAGNESIUM SERPL-MCNC: 2.2 MG/DL (ref 1.6–2.6)
MAGNESIUM SERPL-MCNC: 2.2 MG/DL (ref 1.6–2.6)
MAGNESIUM SERPL-MCNC: 2.3 MG/DL (ref 1.6–2.6)
MAGNESIUM SERPL-MCNC: 2.6 MG/DL (ref 1.6–2.6)
MCH RBC QN AUTO: 33.7 PG (ref 27–31)
MCH RBC QN AUTO: 34 PG (ref 27–31)
MCH RBC QN AUTO: 34.2 PG (ref 27–31)
MCH RBC QN AUTO: 34.3 PG (ref 27–31)
MCH RBC QN AUTO: 34.4 PG (ref 27–31)
MCH RBC QN AUTO: 34.7 PG (ref 27–31)
MCH RBC QN AUTO: 34.9 PG (ref 27–31)
MCH RBC QN AUTO: 35.1 PG (ref 27–31)
MCHC RBC AUTO-ENTMCNC: 31.7 G/DL (ref 32–36)
MCHC RBC AUTO-ENTMCNC: 32.1 G/DL (ref 32–36)
MCHC RBC AUTO-ENTMCNC: 32.3 G/DL (ref 32–36)
MCHC RBC AUTO-ENTMCNC: 32.3 G/DL (ref 32–36)
MCHC RBC AUTO-ENTMCNC: 32.5 G/DL (ref 32–36)
MCHC RBC AUTO-ENTMCNC: 32.6 G/DL (ref 32–36)
MCHC RBC AUTO-ENTMCNC: 32.8 G/DL (ref 32–36)
MCHC RBC AUTO-ENTMCNC: 33 G/DL (ref 32–36)
MCV RBC AUTO: 105 FL (ref 82–98)
MCV RBC AUTO: 107 FL (ref 82–98)
MCV RBC AUTO: 108 FL (ref 82–98)
MCV RBC AUTO: 108 FL (ref 82–98)
MONOCYTES # BLD AUTO: 0.2 K/UL (ref 0.3–1)
MONOCYTES # BLD AUTO: 0.4 K/UL (ref 0.3–1)
MONOCYTES # BLD AUTO: 0.5 K/UL (ref 0.3–1)
MONOCYTES # BLD AUTO: 0.5 K/UL (ref 0.3–1)
MONOCYTES NFR BLD: 1.6 % (ref 4–15)
MONOCYTES NFR BLD: 2 % (ref 4–15)
MONOCYTES NFR BLD: 3.2 % (ref 4–15)
MONOCYTES NFR BLD: 4.2 % (ref 4–15)
MONOCYTES NFR BLD: 4.8 % (ref 4–15)
MONOCYTES NFR BLD: 6.9 % (ref 4–15)
MONOCYTES NFR BLD: 8.3 % (ref 4–15)
MONOCYTES NFR BLD: 9.4 % (ref 4–15)
MYOGLOBIN UR-MCNC: 16 MCG/L
NEUTROPHILS # BLD AUTO: 11.6 K/UL (ref 1.8–7.7)
NEUTROPHILS # BLD AUTO: 14.7 K/UL (ref 1.8–7.7)
NEUTROPHILS # BLD AUTO: 3.3 K/UL (ref 1.8–7.7)
NEUTROPHILS # BLD AUTO: 4.4 K/UL (ref 1.8–7.7)
NEUTROPHILS # BLD AUTO: 5.3 K/UL (ref 1.8–7.7)
NEUTROPHILS # BLD AUTO: 8.2 K/UL (ref 1.8–7.7)
NEUTROPHILS # BLD AUTO: 9 K/UL (ref 1.8–7.7)
NEUTROPHILS NFR BLD: 73.8 % (ref 38–73)
NEUTROPHILS NFR BLD: 76 % (ref 38–73)
NEUTROPHILS NFR BLD: 80.5 % (ref 38–73)
NEUTROPHILS NFR BLD: 81.9 % (ref 38–73)
NEUTROPHILS NFR BLD: 87.2 % (ref 38–73)
NEUTROPHILS NFR BLD: 89.9 % (ref 38–73)
NEUTROPHILS NFR BLD: 92.5 % (ref 38–73)
NEUTROPHILS NFR BLD: 95.8 % (ref 38–73)
NEUTS BAND NFR BLD MANUAL: 20 %
NITRITE UR QL STRIP: NEGATIVE
NRBC BLD-RTO: 0 /100 WBC
OHS QRS DURATION: 86 MS
OHS QTC CALCULATION: 432 MS
PH UR STRIP: 6 [PH] (ref 5–8)
PLATELET # BLD AUTO: 183 K/UL (ref 150–450)
PLATELET # BLD AUTO: 199 K/UL (ref 150–450)
PLATELET # BLD AUTO: 332 K/UL (ref 150–450)
PLATELET # BLD AUTO: 362 K/UL (ref 150–450)
PLATELET # BLD AUTO: 363 K/UL (ref 150–450)
PLATELET # BLD AUTO: 364 K/UL (ref 150–450)
PLATELET # BLD AUTO: 397 K/UL (ref 150–450)
PLATELET # BLD AUTO: 407 K/UL (ref 150–450)
PLATELET BLD QL SMEAR: ABNORMAL
PMV BLD AUTO: 9.1 FL (ref 9.2–12.9)
PMV BLD AUTO: 9.2 FL (ref 9.2–12.9)
PMV BLD AUTO: 9.3 FL (ref 9.2–12.9)
PMV BLD AUTO: 9.6 FL (ref 9.2–12.9)
PMV BLD AUTO: 9.7 FL (ref 9.2–12.9)
PMV BLD AUTO: 9.8 FL (ref 9.2–12.9)
PMV BLD AUTO: 9.8 FL (ref 9.2–12.9)
PMV BLD AUTO: 9.9 FL (ref 9.2–12.9)
POCT GLUCOSE: 94 MG/DL (ref 70–110)
POTASSIUM SERPL-SCNC: 3.3 MMOL/L (ref 3.5–5.1)
POTASSIUM SERPL-SCNC: 3.4 MMOL/L (ref 3.5–5.1)
POTASSIUM SERPL-SCNC: 3.5 MMOL/L (ref 3.5–5.1)
POTASSIUM SERPL-SCNC: 3.7 MMOL/L (ref 3.5–5.1)
POTASSIUM SERPL-SCNC: 3.7 MMOL/L (ref 3.5–5.1)
POTASSIUM SERPL-SCNC: 3.8 MMOL/L (ref 3.5–5.1)
POTASSIUM SERPL-SCNC: 4.1 MMOL/L (ref 3.5–5.1)
POTASSIUM SERPL-SCNC: 4.9 MMOL/L (ref 3.5–5.1)
PROT SERPL-MCNC: 5.6 G/DL (ref 6–8.4)
PROT SERPL-MCNC: 5.9 G/DL (ref 6–8.4)
PROT SERPL-MCNC: 6 G/DL (ref 6–8.4)
PROT SERPL-MCNC: 6.2 G/DL (ref 6–8.4)
PROT SERPL-MCNC: 6.3 G/DL (ref 6–8.4)
PROT SERPL-MCNC: 6.5 G/DL (ref 6–8.4)
PROT SERPL-MCNC: 6.9 G/DL (ref 6–8.4)
PROT SERPL-MCNC: 7.1 G/DL (ref 6–8.4)
PROT UR QL STRIP: ABNORMAL
RBC # BLD AUTO: 3.68 M/UL (ref 4.6–6.2)
RBC # BLD AUTO: 3.7 M/UL (ref 4.6–6.2)
RBC # BLD AUTO: 3.83 M/UL (ref 4.6–6.2)
RBC # BLD AUTO: 3.87 M/UL (ref 4.6–6.2)
RBC # BLD AUTO: 3.98 M/UL (ref 4.6–6.2)
RBC # BLD AUTO: 4.03 M/UL (ref 4.6–6.2)
RBC # BLD AUTO: 4.3 M/UL (ref 4.6–6.2)
RBC # BLD AUTO: 4.39 M/UL (ref 4.6–6.2)
SODIUM SERPL-SCNC: 138 MMOL/L (ref 136–145)
SODIUM SERPL-SCNC: 139 MMOL/L (ref 136–145)
SODIUM SERPL-SCNC: 140 MMOL/L (ref 136–145)
SODIUM SERPL-SCNC: 142 MMOL/L (ref 136–145)
SODIUM SERPL-SCNC: 145 MMOL/L (ref 136–145)
SODIUM SERPL-SCNC: 146 MMOL/L (ref 136–145)
SP GR UR STRIP: >1.045 (ref 1–1.03)
URN SPEC COLLECT METH UR: ABNORMAL
UROBILINOGEN UR STRIP-ACNC: 1 EU/DL
VIT B12 SERPL-MCNC: 2667 PG/ML (ref 210–950)
WBC # BLD AUTO: 10.02 K/UL (ref 3.9–12.7)
WBC # BLD AUTO: 12.5 K/UL (ref 3.9–12.7)
WBC # BLD AUTO: 15.34 K/UL (ref 3.9–12.7)
WBC # BLD AUTO: 4.49 K/UL (ref 3.9–12.7)
WBC # BLD AUTO: 5.4 K/UL (ref 3.9–12.7)
WBC # BLD AUTO: 6.53 K/UL (ref 3.9–12.7)
WBC # BLD AUTO: 9.44 K/UL (ref 3.9–12.7)
WBC # BLD AUTO: 9.85 K/UL (ref 3.9–12.7)

## 2024-01-01 PROCEDURE — 85007 BL SMEAR W/DIFF WBC COUNT: CPT

## 2024-01-01 PROCEDURE — 97530 THERAPEUTIC ACTIVITIES: CPT

## 2024-01-01 PROCEDURE — 63600175 PHARM REV CODE 636 W HCPCS: Mod: JZ,JG | Performed by: INTERNAL MEDICINE

## 2024-01-01 PROCEDURE — 25000003 PHARM REV CODE 250

## 2024-01-01 PROCEDURE — 94761 N-INVAS EAR/PLS OXIMETRY MLT: CPT

## 2024-01-01 PROCEDURE — 99215 OFFICE O/P EST HI 40 MIN: CPT | Mod: ,,, | Performed by: STUDENT IN AN ORGANIZED HEALTH CARE EDUCATION/TRAINING PROGRAM

## 2024-01-01 PROCEDURE — 96372 THER/PROPH/DIAG INJ SC/IM: CPT

## 2024-01-01 PROCEDURE — 63600175 PHARM REV CODE 636 W HCPCS

## 2024-01-01 PROCEDURE — 36415 COLL VENOUS BLD VENIPUNCTURE: CPT

## 2024-01-01 PROCEDURE — 85025 COMPLETE CBC W/AUTO DIFF WBC: CPT

## 2024-01-01 PROCEDURE — 81003 URINALYSIS AUTO W/O SCOPE: CPT | Performed by: EMERGENCY MEDICINE

## 2024-01-01 PROCEDURE — 25000003 PHARM REV CODE 250: Performed by: INTERNAL MEDICINE

## 2024-01-01 PROCEDURE — 25000003 PHARM REV CODE 250: Performed by: EMERGENCY MEDICINE

## 2024-01-01 PROCEDURE — 97161 PT EVAL LOW COMPLEX 20 MIN: CPT

## 2024-01-01 PROCEDURE — 97165 OT EVAL LOW COMPLEX 30 MIN: CPT

## 2024-01-01 PROCEDURE — 85027 COMPLETE CBC AUTOMATED: CPT

## 2024-01-01 PROCEDURE — 80053 COMPREHEN METABOLIC PANEL: CPT | Performed by: EMERGENCY MEDICINE

## 2024-01-01 PROCEDURE — 36415 COLL VENOUS BLD VENIPUNCTURE: CPT | Performed by: INTERNAL MEDICINE

## 2024-01-01 PROCEDURE — 25500020 PHARM REV CODE 255: Performed by: EMERGENCY MEDICINE

## 2024-01-01 PROCEDURE — G0378 HOSPITAL OBSERVATION PER HR: HCPCS

## 2024-01-01 PROCEDURE — 99900035 HC TECH TIME PER 15 MIN (STAT)

## 2024-01-01 PROCEDURE — 93010 ELECTROCARDIOGRAM REPORT: CPT | Mod: ,,, | Performed by: INTERNAL MEDICINE

## 2024-01-01 PROCEDURE — 11000001 HC ACUTE MED/SURG PRIVATE ROOM

## 2024-01-01 PROCEDURE — 85025 COMPLETE CBC W/AUTO DIFF WBC: CPT | Performed by: EMERGENCY MEDICINE

## 2024-01-01 PROCEDURE — 63600175 PHARM REV CODE 636 W HCPCS: Performed by: EMERGENCY MEDICINE

## 2024-01-01 PROCEDURE — 80053 COMPREHEN METABOLIC PANEL: CPT

## 2024-01-01 PROCEDURE — 87186 SC STD MICRODIL/AGAR DIL: CPT

## 2024-01-01 PROCEDURE — 27000221 HC OXYGEN, UP TO 24 HOURS

## 2024-01-01 PROCEDURE — 83735 ASSAY OF MAGNESIUM: CPT

## 2024-01-01 PROCEDURE — 87077 CULTURE AEROBIC IDENTIFY: CPT

## 2024-01-01 PROCEDURE — 99900031 HC PATIENT EDUCATION (STAT)

## 2024-01-01 PROCEDURE — 99285 EMERGENCY DEPT VISIT HI MDM: CPT | Mod: 25

## 2024-01-01 PROCEDURE — 97535 SELF CARE MNGMENT TRAINING: CPT | Mod: CO

## 2024-01-01 PROCEDURE — 36415 COLL VENOUS BLD VENIPUNCTURE: CPT | Performed by: EMERGENCY MEDICINE

## 2024-01-01 PROCEDURE — 84153 ASSAY OF PSA TOTAL: CPT

## 2024-01-01 PROCEDURE — 82550 ASSAY OF CK (CPK): CPT | Performed by: INTERNAL MEDICINE

## 2024-01-01 PROCEDURE — 82077 ASSAY SPEC XCP UR&BREATH IA: CPT | Performed by: EMERGENCY MEDICINE

## 2024-01-01 PROCEDURE — 51798 US URINE CAPACITY MEASURE: CPT

## 2024-01-01 PROCEDURE — 83690 ASSAY OF LIPASE: CPT | Performed by: EMERGENCY MEDICINE

## 2024-01-01 PROCEDURE — 0T9B70Z DRAINAGE OF BLADDER WITH DRAINAGE DEVICE, VIA NATURAL OR ARTIFICIAL OPENING: ICD-10-PCS | Performed by: INTERNAL MEDICINE

## 2024-01-01 PROCEDURE — 93005 ELECTROCARDIOGRAM TRACING: CPT

## 2024-01-01 PROCEDURE — 36415 COLL VENOUS BLD VENIPUNCTURE: CPT | Mod: XB

## 2024-01-01 PROCEDURE — 87086 URINE CULTURE/COLONY COUNT: CPT

## 2024-01-01 PROCEDURE — 87088 URINE BACTERIA CULTURE: CPT

## 2024-01-01 PROCEDURE — 97166 OT EVAL MOD COMPLEX 45 MIN: CPT

## 2024-01-01 PROCEDURE — 82607 VITAMIN B-12: CPT | Performed by: INTERNAL MEDICINE

## 2024-01-01 RX ORDER — SODIUM CHLORIDE 0.9 % (FLUSH) 0.9 %
10 SYRINGE (ML) INJECTION
Status: DISCONTINUED | OUTPATIENT
Start: 2024-01-01 | End: 2024-01-01 | Stop reason: HOSPADM

## 2024-01-01 RX ORDER — TALC
6 POWDER (GRAM) TOPICAL NIGHTLY PRN
Status: DISCONTINUED | OUTPATIENT
Start: 2024-01-01 | End: 2024-01-01 | Stop reason: HOSPADM

## 2024-01-01 RX ORDER — POLYETHYLENE GLYCOL 3350 17 G/17G
17 POWDER, FOR SOLUTION ORAL 2 TIMES DAILY
Status: DISCONTINUED | OUTPATIENT
Start: 2024-01-01 | End: 2024-01-01 | Stop reason: HOSPADM

## 2024-01-01 RX ORDER — HYDROCODONE BITARTRATE AND ACETAMINOPHEN 7.5; 325 MG/1; MG/1
1 TABLET ORAL EVERY 4 HOURS PRN
Status: DISCONTINUED | OUTPATIENT
Start: 2024-01-01 | End: 2024-01-01

## 2024-01-01 RX ORDER — ACETAMINOPHEN 325 MG/1
650 TABLET ORAL EVERY 6 HOURS PRN
Status: DISCONTINUED | OUTPATIENT
Start: 2024-01-01 | End: 2024-01-01 | Stop reason: HOSPADM

## 2024-01-01 RX ORDER — SODIUM BICARBONATE 650 MG/1
650 TABLET ORAL ONCE
Status: COMPLETED | OUTPATIENT
Start: 2024-01-01 | End: 2024-01-01

## 2024-01-01 RX ORDER — POLYETHYLENE GLYCOL 3350 17 G/17G
17 POWDER, FOR SOLUTION ORAL DAILY
Status: DISCONTINUED | OUTPATIENT
Start: 2024-01-01 | End: 2024-01-01

## 2024-01-01 RX ORDER — PROCHLORPERAZINE EDISYLATE 5 MG/ML
5 INJECTION INTRAMUSCULAR; INTRAVENOUS EVERY 6 HOURS PRN
Status: DISCONTINUED | OUTPATIENT
Start: 2024-01-01 | End: 2024-01-01 | Stop reason: HOSPADM

## 2024-01-01 RX ORDER — POLYETHYLENE GLYCOL 3350 17 G/17G
17 POWDER, FOR SOLUTION ORAL 2 TIMES DAILY PRN
Status: DISCONTINUED | OUTPATIENT
Start: 2024-01-01 | End: 2024-01-01

## 2024-01-01 RX ORDER — MORPHINE SULFATE 4 MG/ML
4 INJECTION, SOLUTION INTRAMUSCULAR; INTRAVENOUS
Status: DISCONTINUED | OUTPATIENT
Start: 2024-01-01 | End: 2024-01-01 | Stop reason: HOSPADM

## 2024-01-01 RX ORDER — DICYCLOMINE HYDROCHLORIDE 10 MG/1
10 CAPSULE ORAL 3 TIMES DAILY PRN
Status: DISCONTINUED | OUTPATIENT
Start: 2024-01-01 | End: 2024-01-01

## 2024-01-01 RX ORDER — MUPIROCIN 20 MG/G
OINTMENT TOPICAL 2 TIMES DAILY
Status: DISCONTINUED | OUTPATIENT
Start: 2024-01-01 | End: 2024-01-01 | Stop reason: HOSPADM

## 2024-01-01 RX ORDER — ENOXAPARIN SODIUM 100 MG/ML
40 INJECTION SUBCUTANEOUS EVERY 24 HOURS
Status: DISCONTINUED | OUTPATIENT
Start: 2024-01-01 | End: 2024-01-01

## 2024-01-01 RX ORDER — POLYETHYLENE GLYCOL 3350 17 G/17G
17 POWDER, FOR SOLUTION ORAL DAILY PRN
Status: DISCONTINUED | OUTPATIENT
Start: 2024-01-01 | End: 2024-01-01

## 2024-01-01 RX ORDER — DICYCLOMINE HYDROCHLORIDE 10 MG/1
20 CAPSULE ORAL 3 TIMES DAILY PRN
Status: DISCONTINUED | OUTPATIENT
Start: 2024-01-01 | End: 2024-01-01 | Stop reason: HOSPADM

## 2024-01-01 RX ORDER — PANTOPRAZOLE SODIUM 40 MG/1
40 TABLET, DELAYED RELEASE ORAL DAILY
Status: DISCONTINUED | OUTPATIENT
Start: 2024-01-01 | End: 2024-01-01

## 2024-01-01 RX ORDER — SUCRALFATE 1 G/10ML
1 SUSPENSION ORAL EVERY 6 HOURS
Status: DISCONTINUED | OUTPATIENT
Start: 2024-01-01 | End: 2024-01-01 | Stop reason: HOSPADM

## 2024-01-01 RX ORDER — MORPHINE SULFATE 2 MG/ML
2 INJECTION, SOLUTION INTRAMUSCULAR; INTRAVENOUS EVERY 4 HOURS PRN
Status: DISCONTINUED | OUTPATIENT
Start: 2024-01-01 | End: 2024-01-01

## 2024-01-01 RX ORDER — MEGESTROL ACETATE 40 MG/1
40 TABLET ORAL DAILY
Status: DISCONTINUED | OUTPATIENT
Start: 2024-01-01 | End: 2024-01-01 | Stop reason: HOSPADM

## 2024-01-01 RX ORDER — DICYCLOMINE HYDROCHLORIDE 10 MG/1
20 CAPSULE ORAL
Status: COMPLETED | OUTPATIENT
Start: 2024-01-01 | End: 2024-01-01

## 2024-01-01 RX ORDER — NALOXONE HCL 0.4 MG/ML
0.4 VIAL (ML) INJECTION
Status: DISCONTINUED | OUTPATIENT
Start: 2024-01-01 | End: 2024-01-01 | Stop reason: HOSPADM

## 2024-01-01 RX ORDER — DICYCLOMINE HYDROCHLORIDE 20 MG/1
20 TABLET ORAL 2 TIMES DAILY
Qty: 20 TABLET | Refills: 0 | Status: SHIPPED | OUTPATIENT
Start: 2024-01-01 | End: 2024-03-10

## 2024-01-01 RX ORDER — PANTOPRAZOLE SODIUM 40 MG/1
40 TABLET, DELAYED RELEASE ORAL 2 TIMES DAILY
Status: DISCONTINUED | OUTPATIENT
Start: 2024-01-01 | End: 2024-01-01 | Stop reason: HOSPADM

## 2024-01-01 RX ORDER — TAMSULOSIN HYDROCHLORIDE 0.4 MG/1
0.4 CAPSULE ORAL DAILY
Status: DISCONTINUED | OUTPATIENT
Start: 2024-01-01 | End: 2024-01-01 | Stop reason: HOSPADM

## 2024-01-01 RX ORDER — LACTULOSE 10 G/15ML
20 SOLUTION ORAL 3 TIMES DAILY
Status: DISCONTINUED | OUTPATIENT
Start: 2024-01-01 | End: 2024-01-01 | Stop reason: HOSPADM

## 2024-01-01 RX ORDER — MORPHINE SULFATE 4 MG/ML
4 INJECTION, SOLUTION INTRAMUSCULAR; INTRAVENOUS EVERY 4 HOURS PRN
Status: DISCONTINUED | OUTPATIENT
Start: 2024-01-01 | End: 2024-01-01 | Stop reason: HOSPADM

## 2024-01-01 RX ORDER — SODIUM CHLORIDE 9 MG/ML
INJECTION, SOLUTION INTRAVENOUS CONTINUOUS
Status: DISCONTINUED | OUTPATIENT
Start: 2024-01-01 | End: 2024-01-01

## 2024-01-01 RX ORDER — MORPHINE SULFATE 15 MG/1
15 TABLET, FILM COATED, EXTENDED RELEASE ORAL EVERY 12 HOURS
Status: DISCONTINUED | OUTPATIENT
Start: 2024-01-01 | End: 2024-01-01 | Stop reason: HOSPADM

## 2024-01-01 RX ORDER — ONDANSETRON HYDROCHLORIDE 2 MG/ML
8 INJECTION, SOLUTION INTRAVENOUS EVERY 6 HOURS PRN
Status: DISCONTINUED | OUTPATIENT
Start: 2024-01-01 | End: 2024-01-01 | Stop reason: HOSPADM

## 2024-01-01 RX ADMIN — DICYCLOMINE HYDROCHLORIDE 10 MG: 10 CAPSULE ORAL at 07:03

## 2024-01-01 RX ADMIN — POLYETHYLENE GLYCOL (3350) 17 G: 17 POWDER, FOR SOLUTION ORAL at 12:03

## 2024-01-01 RX ADMIN — PROCHLORPERAZINE EDISYLATE 5 MG: 5 INJECTION INTRAMUSCULAR; INTRAVENOUS at 06:03

## 2024-01-01 RX ADMIN — IOHEXOL 100 ML: 350 INJECTION, SOLUTION INTRAVENOUS at 11:02

## 2024-01-01 RX ADMIN — SODIUM CHLORIDE: 9 INJECTION, SOLUTION INTRAVENOUS at 02:03

## 2024-01-01 RX ADMIN — PANTOPRAZOLE SODIUM 40 MG: 40 TABLET, DELAYED RELEASE ORAL at 08:03

## 2024-01-01 RX ADMIN — MORPHINE SULFATE 2 MG: 2 INJECTION, SOLUTION INTRAMUSCULAR; INTRAVENOUS at 02:03

## 2024-01-01 RX ADMIN — MORPHINE SULFATE 4 MG: 4 INJECTION, SOLUTION INTRAMUSCULAR; INTRAVENOUS at 11:03

## 2024-01-01 RX ADMIN — PANTOPRAZOLE SODIUM 40 MG: 40 TABLET, DELAYED RELEASE ORAL at 09:03

## 2024-01-01 RX ADMIN — MORPHINE SULFATE 2 MG: 2 INJECTION, SOLUTION INTRAMUSCULAR; INTRAVENOUS at 10:03

## 2024-01-01 RX ADMIN — SODIUM CHLORIDE: 9 INJECTION, SOLUTION INTRAVENOUS at 10:03

## 2024-01-01 RX ADMIN — SUCRALFATE 1 G: 1 SUSPENSION ORAL at 12:03

## 2024-01-01 RX ADMIN — ENOXAPARIN SODIUM 40 MG: 40 INJECTION SUBCUTANEOUS at 05:03

## 2024-01-01 RX ADMIN — MORPHINE SULFATE 2 MG: 2 INJECTION, SOLUTION INTRAMUSCULAR; INTRAVENOUS at 01:03

## 2024-01-01 RX ADMIN — MEGESTROL ACETATE 40 MG: 40 TABLET ORAL at 09:03

## 2024-01-01 RX ADMIN — MORPHINE SULFATE 15 MG: 15 TABLET, FILM COATED, EXTENDED RELEASE ORAL at 08:03

## 2024-01-01 RX ADMIN — LACTULOSE 20 G: 20 SOLUTION ORAL at 08:03

## 2024-01-01 RX ADMIN — HYDROCODONE BITARTRATE AND ACETAMINOPHEN 1 TABLET: 7.5; 325 TABLET ORAL at 08:03

## 2024-01-01 RX ADMIN — MORPHINE SULFATE 2 MG: 2 INJECTION, SOLUTION INTRAMUSCULAR; INTRAVENOUS at 04:03

## 2024-01-01 RX ADMIN — LACTULOSE 20 G: 20 SOLUTION ORAL at 03:03

## 2024-01-01 RX ADMIN — MORPHINE SULFATE 15 MG: 15 TABLET, FILM COATED, EXTENDED RELEASE ORAL at 09:03

## 2024-01-01 RX ADMIN — MORPHINE SULFATE 4 MG: 4 INJECTION, SOLUTION INTRAMUSCULAR; INTRAVENOUS at 06:03

## 2024-01-01 RX ADMIN — DICYCLOMINE HYDROCHLORIDE 20 MG: 10 CAPSULE ORAL at 03:03

## 2024-01-01 RX ADMIN — POLYETHYLENE GLYCOL (3350) 17 G: 17 POWDER, FOR SOLUTION ORAL at 08:03

## 2024-01-01 RX ADMIN — SODIUM CHLORIDE: 9 INJECTION, SOLUTION INTRAVENOUS at 08:03

## 2024-01-01 RX ADMIN — TAMSULOSIN HYDROCHLORIDE 0.4 MG: 0.4 CAPSULE ORAL at 08:03

## 2024-01-01 RX ADMIN — MORPHINE SULFATE 2 MG: 2 INJECTION, SOLUTION INTRAMUSCULAR; INTRAVENOUS at 06:03

## 2024-01-01 RX ADMIN — MORPHINE SULFATE 2 MG: 2 INJECTION, SOLUTION INTRAMUSCULAR; INTRAVENOUS at 11:03

## 2024-01-01 RX ADMIN — SUCRALFATE 1 G: 1 SUSPENSION ORAL at 11:03

## 2024-01-01 RX ADMIN — SODIUM BICARBONATE 650 MG TABLET 650 MG: at 09:03

## 2024-01-01 RX ADMIN — MORPHINE SULFATE 4 MG: 4 INJECTION, SOLUTION INTRAMUSCULAR; INTRAVENOUS at 03:03

## 2024-01-01 RX ADMIN — LINACLOTIDE 145 MCG: 145 CAPSULE, GELATIN COATED ORAL at 08:03

## 2024-01-01 RX ADMIN — HYDROCODONE BITARTRATE AND ACETAMINOPHEN 1 TABLET: 7.5; 325 TABLET ORAL at 03:03

## 2024-01-01 RX ADMIN — HYDROCODONE BITARTRATE AND ACETAMINOPHEN 1 TABLET: 7.5; 325 TABLET ORAL at 10:03

## 2024-01-01 RX ADMIN — PROCHLORPERAZINE EDISYLATE 5 MG: 5 INJECTION INTRAMUSCULAR; INTRAVENOUS at 12:03

## 2024-01-01 RX ADMIN — SUCRALFATE 1 G: 1 SUSPENSION ORAL at 07:03

## 2024-01-01 RX ADMIN — ONDANSETRON 8 MG: 2 INJECTION INTRAMUSCULAR; INTRAVENOUS at 10:03

## 2024-01-01 RX ADMIN — POLYETHYLENE GLYCOL (3350) 17 G: 17 POWDER, FOR SOLUTION ORAL at 09:03

## 2024-01-01 RX ADMIN — SUCRALFATE 1 G: 1 SUSPENSION ORAL at 06:03

## 2024-01-01 RX ADMIN — SODIUM CHLORIDE: 9 INJECTION, SOLUTION INTRAVENOUS at 09:03

## 2024-01-01 RX ADMIN — SODIUM CHLORIDE: 9 INJECTION, SOLUTION INTRAVENOUS at 06:03

## 2024-01-01 RX ADMIN — LINACLOTIDE 145 MCG: 145 CAPSULE, GELATIN COATED ORAL at 07:03

## 2024-01-01 RX ADMIN — MORPHINE SULFATE 4 MG: 4 INJECTION, SOLUTION INTRAMUSCULAR; INTRAVENOUS at 09:03

## 2024-01-01 RX ADMIN — POLYETHYLENE GLYCOL (3350) 17 G: 17 POWDER, FOR SOLUTION ORAL at 05:03

## 2024-01-01 RX ADMIN — MORPHINE SULFATE 2 MG: 2 INJECTION, SOLUTION INTRAMUSCULAR; INTRAVENOUS at 12:03

## 2024-01-01 RX ADMIN — DICYCLOMINE HYDROCHLORIDE 20 MG: 10 CAPSULE ORAL at 12:02

## 2024-01-01 RX ADMIN — ENOXAPARIN SODIUM 40 MG: 40 INJECTION SUBCUTANEOUS at 04:03

## 2024-01-01 RX ADMIN — ONDANSETRON 8 MG: 2 INJECTION INTRAMUSCULAR; INTRAVENOUS at 12:03

## 2024-01-01 RX ADMIN — HYDROCODONE BITARTRATE AND ACETAMINOPHEN 1 TABLET: 7.5; 325 TABLET ORAL at 11:03

## 2024-01-01 RX ADMIN — SODIUM CHLORIDE: 9 INJECTION, SOLUTION INTRAVENOUS at 05:03

## 2024-01-01 RX ADMIN — TAMSULOSIN HYDROCHLORIDE 0.4 MG: 0.4 CAPSULE ORAL at 03:03

## 2024-01-01 RX ADMIN — Medication 6 MG: at 08:03

## 2024-01-01 RX ADMIN — MORPHINE SULFATE 4 MG: 4 INJECTION, SOLUTION INTRAMUSCULAR; INTRAVENOUS at 07:03

## 2024-01-01 RX ADMIN — PANTOPRAZOLE SODIUM 40 MG: 40 TABLET, DELAYED RELEASE ORAL at 03:03

## 2024-01-01 RX ADMIN — DICYCLOMINE HYDROCHLORIDE 20 MG: 10 CAPSULE ORAL at 12:03

## 2024-01-01 RX ADMIN — TAMSULOSIN HYDROCHLORIDE 0.4 MG: 0.4 CAPSULE ORAL at 09:03

## 2024-01-01 RX ADMIN — DICYCLOMINE HYDROCHLORIDE 20 MG: 10 CAPSULE ORAL at 06:03

## 2024-01-01 RX ADMIN — SODIUM CHLORIDE: 9 INJECTION, SOLUTION INTRAVENOUS at 07:03

## 2024-01-01 RX ADMIN — HYDROCODONE BITARTRATE AND ACETAMINOPHEN 1 TABLET: 7.5; 325 TABLET ORAL at 09:03

## 2024-01-01 RX ADMIN — MEGESTROL ACETATE 40 MG: 40 TABLET ORAL at 08:03

## 2024-01-01 RX ADMIN — MORPHINE SULFATE 4 MG: 4 INJECTION, SOLUTION INTRAMUSCULAR; INTRAVENOUS at 05:03

## 2024-02-09 NOTE — ED PROVIDER NOTES
"Encounter Date: 2/9/2024       History     Chief Complaint   Patient presents with    Abdominal Pain     Pt to the ER with complaints of abdominal pain 10/10 Pt states "its been hurting for a while and its getting worse" Last BM 4-5 days ago. Took tylenol last night with no relief.   Hx of stomach cancer       Pt presents to the ED for abd pain.  He states that it has been ongoing for a "long" time now.  The pain has been off and on but now it is constant.  The pain is all over his abd.  He reports vomiting for the past 6 months on and off.  Over the past 3 nights the pain seems to hurt more.  Pt has a hx of stomach CA.  He reports decrease in his appetite.  Pt is difficult to obtain a hx.    The history is provided by the patient and a relative.     Review of patient's allergies indicates:   Allergen Reactions    Ativan [lorazepam] Anxiety     Patient had paradoxical effect after ativan IV and was so agitated he had to be restrained and transferred to ICU for precedex.     Past Medical History:   Diagnosis Date    Cancer     gastric    Dizziness     Encounter for blood transfusion     GI bleed     Hard of hearing     Hypertension     Insomnia     Personal history of fall     Stomach ulcer     Transfusion reaction     Tumor     removed from stomach     Past Surgical History:   Procedure Laterality Date    APPENDECTOMY      CHOLECYSTECTOMY      CLOSED REDUCTION OF INJURY OF HIP Left 02/09/2023    Procedure: CLOSED REDUCTION, HIP, LEFT;  Surgeon: kC Collado MD;  Location: [facility] OR;  Service: Orthopedics;  Laterality: Left;    ESOPHAGOGASTRODUODENOSCOPY N/A 05/31/2022    Procedure: EGD (ESOPHAGOGASTRODUODENOSCOPY);  Surgeon: Mario Escalera MD;  Location: [facility] ENDO;  Service: Endoscopy;  Laterality: N/A;    GASTRECTOMY      GASTRECTOMY N/A 07/12/2022    Procedure: GASTRECTOMY;  Surgeon: Jordan Montesinos MD;  Location: [facility] OR;  Service: General;  Laterality: N/A;  RN PREOP 6/29/2022   T/S DAY OF SURGERY (LIVES 110 " MILES AWAY)  HAS PCP CLEAR.    KNEE SURGERY      PARTIAL GASTRECTOMY      PERCUTANEOUS PINNING OF FRACTURE Left 02/09/2023    Procedure: PINNING, FRACTURE, PERCUTANEOUS;  Surgeon: Ck Collado MD;  Location: [facility];  Service: Orthopedics;  Laterality: Left;    SHOULDER SURGERY Right      Family History   Problem Relation Age of Onset    No Known Problems Mother     No Known Problems Father     Hypertension Sister     Uterine cancer Sister     No Known Problems Brother     No Known Problems Maternal Grandmother     No Known Problems Maternal Grandfather     No Known Problems Paternal Grandmother     No Known Problems Paternal Grandfather      Social History     Tobacco Use    Smoking status: Never    Smokeless tobacco: Current     Types: Chew   Substance Use Topics    Alcohol use: Yes     Alcohol/week: 2.0 standard drinks of alcohol     Types: 2 Shots of liquor per week    Drug use: Never     Review of Systems    Physical Exam     Initial Vitals [02/09/24 0955]   BP Pulse Resp Temp SpO2   131/83 89 20 98 °F (36.7 °C) 98 %      MAP       --         Physical Exam    Nursing note and vitals reviewed.  Constitutional: He appears well-developed and well-nourished. No distress.   HENT:   Head: Normocephalic and atraumatic.   Eyes: EOM are normal. Pupils are equal, round, and reactive to light.   Neck: Neck supple.   Normal range of motion.  Cardiovascular:  Normal rate, regular rhythm and normal heart sounds.           No murmur heard.  Pulmonary/Chest: Breath sounds normal. He has no wheezes. He has no rhonchi. He has no rales.   Abdominal: Abdomen is soft. Bowel sounds are normal. He exhibits no distension. There is generalized abdominal tenderness (mild).   Musculoskeletal:         General: No tenderness. Normal range of motion.      Cervical back: Normal range of motion and neck supple.     Neurological: He is alert and oriented to person, place, and time. GCS score is 15. GCS eye subscore is 4. GCS verbal subscore  is 5. GCS motor subscore is 6.   Skin: Skin is warm and dry. Capillary refill takes less than 2 seconds.   Psychiatric: He has a normal mood and affect. His behavior is normal. Thought content normal.         ED Course   Procedures  Labs Reviewed   CBC W/ AUTO DIFFERENTIAL - Abnormal; Notable for the following components:       Result Value    RBC 3.87 (*)     Hemoglobin 13.6 (*)      (*)     MCH 35.1 (*)     MPV 9.1 (*)     Lymph # 0.4 (*)     Gran % 81.9 (*)     Lymph % 8.0 (*)     All other components within normal limits   COMPREHENSIVE METABOLIC PANEL - Abnormal; Notable for the following components:    Potassium 3.4 (*)     Albumin 2.6 (*)     All other components within normal limits   URINALYSIS, REFLEX TO URINE CULTURE - Abnormal; Notable for the following components:    Protein, UA Trace (*)     Ketones, UA 1+ (*)     Occult Blood UA Trace (*)     All other components within normal limits    Narrative:     Preferred Collection Type->Urine, Clean Catch  Specimen Source->Urine   LIPASE     EKG Readings: (Independently Interpreted)   Initial Reading: No STEMI. Rhythm: Normal Sinus Rhythm. Heart Rate: 81. Ectopy: No Ectopy. Conduction: Normal. Axis: Normal. Clinical Impression: Normal Sinus Rhythm Other Impression: Abnormal without significant changes     ECG Results              EKG 12-lead (Final result)        Collection Time Result Time QRS Duration OHS QTC Calculation    02/09/24 10:13:19 02/09/24 12:04:00 86 432                     Final result by Interface, Lab In [facility] (02/09/24 12:04:03)                   Narrative:    Test Reason : R10.9,    Vent. Rate : 081 BPM     Atrial Rate : 081 BPM     P-R Int : 150 ms          QRS Dur : 086 ms      QT Int : 372 ms       P-R-T Axes : 055 027 079 degrees     QTc Int : 432 ms    Normal sinus rhythm  Possible Anterior infarct (cited on or before 31-MAY-2022)vs lead placement   but same as last year  Abnormal ECG  When compared with ECG of 08-FEB-2023  13:12,  No significant change was found  Confirmed by Lester RAHMAN MD ([id number]) on 2/9/2024 12:03:59 PM    Referred By: [email or web address]   SELF           Confirmed By:Lester RAHMAN MD                                  Imaging Results              CT Abdomen Pelvis With IV Contrast NO Oral Contrast (Final result)  Result time 02/09/24 11:50:48      Final result by Roslyn Vernon MD (02/09/24 11:50:48)                   Impression:      1. Previous gastric surgery  2. New mild to moderate ascites around the liver and spleen and in the pericolic gutters and pelvis.  There is some heterogeneity of the omentum anteriorly which could just be due to the fluid but cannot exclude malignant ascites or omental metastasis.  3. Diverticulosis but no diverticulitis  4. Cholecystectomy  5. Left hip hardware      Electronically signed by: Roslyn Vernon MD  Date:    02/09/2024  Time:    11:50               Narrative:    EXAMINATION:  CT ABDOMEN PELVIS WITH IV CONTRAST    CLINICAL HISTORY:  Abdominal pain, acute, nonlocalized;Patient has a history of stomach cancer;    TECHNIQUE:  Iterative reconstruction technique was used.    CT/Cardiac Nuclear exams in prior 12 months: 2    COMPARISON:  06/20/2023.    FINDINGS:  Hardware fixates the left hip is.  The lung bases are clear.  There is been a cholecystectomy.  Large calcification benign in the spleen.  The patient has had surgery of the stomach.  Portal vein is patent.  Liver and spleen show no masses.  The pancreas has chronic changes.  The adrenals are normal.  There is left renal cyst.  There is new mild to moderate ascites around the liver and spleen and in the pericolic gutters and pelvis.  There is diffuse diverticulosis but no diverticulitis.  No significant periaortic nodes.                                       Medications   iohexoL (OMNIPAQUE 350) injection 100 mL (100 mLs Intravenous Given 2/9/24 1122)   dicyclomine capsule 20 mg (20 mg Oral Given 2/9/24 1243)     Medical Decision  Making  Patient presented to the ED with abdominal pain that has been ongoing for the past several months.  His blood work was essentially nondiagnostic.  The CT did show that there was some ascites present.  Radiology uncertain if this was malignant ascites, benign or something else.  With his symptoms I suggested that he see about following up with his oncologist as soon as possible.  He was supposed to see his oncologist a while back, but never followed up and kept the appointment.  I explained to him that I could not tell him if his abdominal pain was due to a return of the cancer or not.After careful review history, physical and supporting data, there is very low suspicion for an acute abdomen.  Differentials include, but not limited to appendicitis, torsion, perforated viscus, ischemic bowel, obstruction, and infarct.  The patient's symptoms have improved from presentation and appears to be clinically well.  Patient re-examined prior to discharge and appears to have improved as well.  Patient has been encouraged to follow up with his/her PCP and return to the ED with any lack of improvement or worsening symptoms.        Amount and/or Complexity of Data Reviewed  Labs: ordered.  Radiology: ordered.    Risk  Prescription drug management.      Additional MDM:   Differential Diagnosis:   SBO, diverticulitis, pancreatitis, gastritis, intussusception                                    Clinical Impression:  Final diagnoses:  [R10.9] Abdominal pain (Primary)  [R10.84] Generalized abdominal pain          ED Disposition Condition    Discharge Stable          ED Prescriptions       Medication Sig Dispense Start Date End Date Auth. Provider    dicyclomine (BENTYL) 20 mg tablet Take 1 tablet (20 mg total) by mouth 2 (two) times daily. 20 tablet 2/9/2024 3/10/2024 David Villalta MD          Follow-up Information       Follow up With Specialties Details Why Contact Info    Caroline Florez MD Internal Medicine Call  in 3 days As needed [address] [facility] [address]  [phone]               David Villalta MD  02/09/24 7019

## 2024-02-19 NOTE — PROGRESS NOTES
Diagnosis: Gastric adenocarcinoma, pT4 pN3aM0, poorly differentiated, SANJAY, HER2 negative,     Current treatment:  Surveillance          Treatment history   07/12/2022-partial gastrectomy   08/24/2022-09/23/2022:  Concurrent chemo RT    HPI:  86-year-old with no significant past medical history except for gastric ulcer status post surgery 40-50 years back and cholecystectomy who presented to clinic to establish care for new diagnosis of gastric adenocarcinoma.  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  Patient underwent CT abdomen pelvis with contrast without evidence of metastatic disease.  He was scheduled for a partial gastrectomy which he had on 07/12/2022, pathology from which revealed adenocarcinoma with neuroendocrine features, moderate to poorly differentiated, with positive radial margin, with invasion into the serosa and direct invasion the small intestine as well as 8 positive lymph nodes.  Patient has healed well from surgery and is back to his baseline in terms of his functional status.  He reports intermittent symptoms of nausea and vomiting however denies any change in his bowel habits.  He denies any further episodes of hemoptysis hematemesis, hematochezia or melena.  Patient reports 30 lb weight loss since January this year. He lives with his wife, has a son and a daughter who live close by.  Patient is hard of hearing.  He is independent with his ADLs and most office IADLs.  Patient's daughter tells me he cut grass day before yesterday.  Has a family history of lung cancer in his father and uterine cancer in his sister.  He is a former smoker and currently chews tobacco.  He used to work in an oil field currently retired.      Patient was lost to follow-up in our clinic after his f/u with us in 03/2023 and  presented back to clinic in     Interval history   Since his last follow-up visit, patient was in the ER on 2024 with symptoms of abdominal pain and had his CT abdomen pelvis with contrast done which revealed ascites of uncertain etiology with malignant ascites being a consideration.      2024, patient reports symptoms of abdominal pain for about week to 10 days.  Constant, unable to describe the nature of the pain.  He also reports left rib pain.  He denies any headaches, vision changes but does report nausea and vomiting as well as decreased appetite.  He reports 40 lb weight loss over the last 2 years.  He reports having bowel movements once a week.    IMAGIN/09/2024 CT abdomen pelvis with IV contrast:  Previous gastric surgery, new mild-to-moderate ascites around the liver and spleen.  There was some heterogeneity of the omentum anteriorly which could be due to fluid but can not exclude malignant ascites or omental metastases.  Diverticulosis but no diverticulitis.  Cholecystectomy.  Left hip hardware.    2023 CT chest with contrast new healing left 7th through 9th lateral rib fractures.  Stable loss of vertebral body height at T5-T8 and T11.  Stable pulmonary abnormalities.    2023 CT abdomen pelvis with contrast:  Stable gastrectomy changes, no evidence of recurrent local or metastatic disease in the abdomen or pelvis.  Evidence of prior granulomatous disease.  Bilateral renal cysts.  Diffuse colonic diverticulosis, advanced sigmoid colon.  Left hip hardware.  Stable loss of vertebral body height at L2.    2022:  CT scan C/A/P:  Negative study.  No evidence of recurrent cancer.  2022 : PET-CT-moderate diffuse uptake at the suture line in the stomach which is probably inflammatory in etiology, however continued surveillance is recommended.  No convincing PET evidence of metastatic disease.  Mild uptake in the anterior left 4th and 5th rib which is favored  being on a posttraumatic PC is however continue monitoring is recommended.  No other suspicious uptake    PATHOLOGY:  07/12/2022:   Partial gastrectomy:   Adenocarcinoma with neuroendocrine features, moderate to poorly   differentiated (4.5 cm) with invasion of the serosa and direct extension into   the small intestine   Radial margin involved by invasive carcinoma   Proximal and distal surgical margins negative for dysplasia or malignancy   Lymphovascular invasion present   Metastatic carcinoma identified in eight out of 13 lymph nodes (8/13),   supported by AE1/AE3 immunostains with adequate controls.   The largest metastatic deposit measures 3 mm in greatest linear dimension   No definitive extranodal extension   PATHOLOGIC TNM STAGING: [id number]   HER2 1+, negative.  Positive for synaptophysin      06/16/2022  Fragments of gastric non-oxyntic mucosa (submitted as stomach ulcerated mass   biopsy):   -Infiltrating, poorly differentiated carcinoma with neuroendocrine features   -Morphologic and immunohistochemical features are not specifically suggestive   of either a primary gastric adenocarcinoma or a neuroendocrine carcinoma,   although neither of those two possibilities can be excluded.  The possibility   of a carcinoma from another site extending or metastasizing to the stomach   also cannot be excluded.  Positive for AE1/AE3, Negative for CK 7, CK 20, CDX 2.  CEA focally positive within benign glands.  HER2 1+, weak positivity for synaptophysin but chromogranin is negative.    Full interpretation requires correlation with   clinical, radiographic, and other findings.   -MMR by immunohistochemical stains shows normal, retained MLH1, MSH2, MSH6,   and PMS1     Labs:    02/15/2024:  Creatinine 0.74, albumin 2.6, calcium 8.9, LFTs unremarkable, WBC count 7.7, hemoglobin 14.3, .6, platelet count 280, ANC 6.50.    03/2023 cr 0.85, TB 1.4, alkphos 160, LFTs WNL, wbc 4.05, hgb 14.5, plt 165, ANC  2.39  09/06/2022:  Creatinine 0.84, albumin 3.8, calcium 9.4, alk-phos 100, total bili 1.4,  AST 14, ALT 8, WBC count 5.26, hemoglobin 12.4, MCV 84.4, platelet count 300, ANC 3.94.    Past Medical History:   Diagnosis Date    Cancer     gastric    Dizziness     Encounter for blood transfusion     GI bleed     Hard of hearing     Hypertension     Insomnia     Personal history of fall     Stomach ulcer     Transfusion reaction     Tumor     removed from stomach       Past Surgical History:   Procedure Laterality Date    APPENDECTOMY      CHOLECYSTECTOMY      CLOSED REDUCTION OF INJURY OF HIP Left 02/09/2023    Procedure: CLOSED REDUCTION, HIP, LEFT;  Surgeon: Ck Collado MD;  Location: [facility] OR;  Service: Orthopedics;  Laterality: Left;    ESOPHAGOGASTRODUODENOSCOPY N/A 05/31/2022    Procedure: EGD (ESOPHAGOGASTRODUODENOSCOPY);  Surgeon: Mario Escalera MD;  Location: [facility];  Service: Endoscopy;  Laterality: N/A;    GASTRECTOMY      GASTRECTOMY N/A 07/12/2022    Procedure: GASTRECTOMY;  Surgeon: Jordan Montesinos MD;  Location: [facility] OR;  Service: General;  Laterality: N/A;  RN PREOP 6/29/2022   T/S DAY OF SURGERY (LIVES 110 MILES AWAY)  HAS PCP CLEAR.    KNEE SURGERY      PARTIAL GASTRECTOMY      PERCUTANEOUS PINNING OF FRACTURE Left 02/09/2023    Procedure: PINNING, FRACTURE, PERCUTANEOUS;  Surgeon: Ck Collado MD;  Location: [facility] OR;  Service: Orthopedics;  Laterality: Left;    SHOULDER SURGERY Right        Family History   Problem Relation Age of Onset    No Known Problems Mother     No Known Problems Father     Hypertension Sister     Uterine cancer Sister     No Known Problems Brother     No Known Problems Maternal Grandmother     No Known Problems Maternal Grandfather     No Known Problems Paternal Grandmother     No Known Problems Paternal Grandfather        Social History     Socioeconomic History    Marital status:    Tobacco Use    Smoking status: Never    Smokeless tobacco: Current      Types: Chew   Substance and Sexual Activity    Alcohol use: Yes     Alcohol/week: 2.0 standard drinks of alcohol     Types: 2 Shots of liquor per week    Drug use: Never    Sexual activity: Not Currently       Current Outpatient Medications   Medication Sig Dispense Refill    diclofenac sodium (VOLTAREN) 1 % Gel Apply 2 g topically 4 (four) times daily as needed (pain). (Patient not taking: Reported on 2/19/2024) 450 g 2    dicyclomine (BENTYL) 20 mg tablet Take 1 tablet (20 mg total) by mouth 2 (two) times daily. (Patient not taking: Reported on 2/19/2024) 20 tablet 0    HYDROcodone-acetaminophen (NORCO) 5-325 mg per tablet Take 1 tablet by mouth every 6 (six) hours as needed for Pain. (Patient not taking: Reported on 2/19/2024) 20 tablet 0    hydrOXYzine HCL (ATARAX) 25 MG tablet Take 1 tablet (25 mg total) by mouth 3 (three) times daily as needed for Anxiety. (Patient not taking: Reported on 2/19/2024) 90 tablet 1    ibuprofen (ADVIL,MOTRIN) 600 MG tablet Take 1 tablet (600 mg total) by mouth every 6 (six) hours as needed for Pain. (Patient not taking: Reported on 2/19/2024) 40 tablet 0    megestroL (MEGACE) 40 MG Tab Take 1 tablet (40 mg total) by mouth once daily. (Patient not taking: Reported on 2/19/2024.) 30 tablet 11    mupirocin (BACTROBAN) 2 % ointment Apply topically 2 (two) times daily. (Patient not taking: Reported on 2/19/2024) 22 g 0    ondansetron (ZOFRAN) 4 MG tablet Take 1 tablet (4 mg total) by mouth every 8 (eight) hours as needed for Nausea. (Patient not taking: Reported on 2/19/2024) 30 tablet 3    ondansetron (ZOFRAN) 4 MG tablet Take 1 tablet (4 mg total) by mouth every 6 (six) hours as needed. (Patient not taking: Reported on 2/19/2024) 12 tablet 0    ondansetron (ZOFRAN-ODT) 4 MG TbDL Take 1 tablet (4 mg total) by mouth 3 (three) times daily as needed (nausea). (Patient not taking: Reported on 2/19/2024) 30 tablet 0    pantoprazole (PROTONIX) 40 MG tablet Take 1 tablet (40 mg total) by  mouth once daily. (Patient not taking: Reported on 2/1/2024) 30 tablet O    tamsulosin (FLOMAX) 0.4 mg Cap Take 1 capsule (0.4 mg total) by mouth once daily. (Patient not taking: Reported on 2/19/2024) 30 capsule 11    triamcinolone acetonide 0.1% (KENALOG) 0.1 % cream Apply topically 2 (two) times daily. (Patient not taking: Reported on 2/19/2024) 453.6 g 1     No current facility-administered medications for this visit.       Review of patient's allergies indicates:   Allergen Reactions    Ativan [lorazepam] Anxiety     Patient had paradoxical effect after ativan IV and was so agitated he had to be restrained and transferred to ICU for precedex.       ROS:  CONSTITUTIONAL: no fevers, no chills, + weight loss, no fatigue, no weakness  HEMATOLOGIC: no abnormal bleeding, no abnormal bruising, no drenching night sweats  ONCOLOGIC: no new masses or lumps  HEENT:  See complaint of ringing in left ear.  CVS: no chest pain, no palpitations, no dyspnea on exertion  RESP: no shortness of breath, no hemoptysis, no cough  GI: + nausea, no vomiting, no diarrhea, no constipation, no melena, no hematochezia, no hematemesis, + abdominal pain, no increase in abdominal girth  : no dysuria, no hematuria, no hesitancy, no scrotal swelling, no discharge  INTEGUMENT: no rashes, no abnormal bruising, no nail pitting, no hyperpigmentation  NEURO: no falls, no memory loss, no paresthesias or dysesthesias, no urofecal incontinence or retention, no loss of strength on any extremity  MSK: no back pain, no new joint pain, no joint swelling  PSYCH: no suicidal or homicidal ideation, no depression, no insomnia, no anhedonia  ENDOCRINE: no heat or cold intolerance, no polyuria, no polydipsia      Physical exam     Vitals:    02/19/24 1017   BP: (!) 146/93   Pulse: 82   Resp: 18   Temp: 97.8 °F (36.6 °C)       ECOG PS 1-2  GA: AAOx3, NAD, accompanied by his wife and daughter.  Bitemporal wasting  HEENT: NCAT, PERRLA, EOMI, Poor dentition, moist  oral mucous membranes, hard of hearing  LYMPH: no cervical, axillary or supraclavicular adenopathy  CVS: s1s2 RRR, no M/R/G  RESP: CTA b/l, no crackles, no wheezes or rhonchi  ABD: soft, NT, ND, BS+, no hepatosplenomegaly, healing midline incision with staples in place without surrounding edema, erythema or discharge.  EXT: no deformities, no pedal edema  SKIN: no rashes, warm and dry  NEURO: normal mentation, strength 5/5 on all 4 extremities, no sensory deficits    ASSESSMENT:    Gastric adenocarcinoma, pT4 pN3aM0, poorly differentiated, SANJAY, HER2 negative,   Status post partial gastrectomy on 07/12/2022,  positive radial margins, 8/13 positive lymph nodes  Advanced age, relatively fit ECOG 1-2  Initial PET-CT without evidence of distant metastatic disease  Patient was deemed a poor candidate for multiagent systemic chemotherapy in the setting of his advanced age  Patient was seen by radiation oncology with recommendations for concurrent chemo RT, which he finished with Xeloda in September 2022  Patient has been lost to follow-up in our clinic from March 2023 and presented to clinic in February 2024 to reestablish care   Patient had a CT abdomen pelvis with IV contrast done in February 2024 which revealed findings of ascites concerning for malignant ascites/omental metastases.      Plan     Discussed with patient the concern for recurrence given CT done in the ER earlier this month  Will plan for PET-CT for better characterization of the disease burden  Given patient's advanced age, he might not be able to tolerate palliative systemic chemotherapy.  Discuss this with patient's wife and daughter who accompanied him today.  Patient was unable to participate a lot in today's interview due to severe hearing loss and limited insight into the disease process.      A total of  40 minutes were spent in review of records, interpretation of test, coordination of care, discussion and counseling with the patient.       Portions of the record may have been created with voice recognition software. Occasional wrong-word or sound-a-like substitutions may have occurred due to the inherent limitations of voice recognition software.

## 2024-03-01 PROBLEM — E86.0 DEHYDRATION: Status: ACTIVE | Noted: 2024-01-01

## 2024-03-01 NOTE — HPI
"ED HPI: 88-year-old male with a history of stomach cancer presents the ER with abdominal pain has been going on for "a very long time" now associated with nausea and vomiting that has been going on for awhile as well.  Denies any fever.  No blood in stool or vomit.  Not ill appearing, talking in full sentences without issue.  No chest pain or shortness of breath.  Describes the pain as diffuse.  Was seen here a few weeks ago for same issue, full workup done including CT scan.  See results:     Impression:     1. Previous gastric surgery  2. New mild to moderate ascites around the liver and spleen and in the pericolic gutters and pelvis. There is some heterogeneity of the omentum anteriorly which could just be due to the fluid but cannot exclude malignant ascites or omental metastasis.  3. Diverticulosis but no diverticulitis  4. Cholecystectomy  5. 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  Patient has been evaluated in the clinic twice within the last month for urinary issues, weight loss, dehydration.  Patient was given 1 L of IV fluids during most recent clinic visit for dehydration, and had labs performed with Oncology later that afternoon.  Patient had a follow up with oncologist on 02/19/2024 and had a PET scan last week.  Patient's daughter who is present at bedside reports they have an upcoming appointment next Tuesday for the PET scan results.  Per oncology note, he does not feel patient would be an appropriate candidate for systemic palliative chemotherapy due to advanced age.  Labs obtained in the emergency department reviewed.  Patient noted with a mild dehydration, and reports improvement in " abdominal pain and weakness after administration of IV fluids.  Patient reports poor appetite, minimal oral intake, severe weakness with recurrent falls.  Patient admitted for rehydration with IVF, antiemetics, monitoring.

## 2024-03-01 NOTE — ED PROVIDER NOTES
"Encounter Date: 3/1/2024       History     Chief Complaint   Patient presents with    Nausea     Arrives via [facility]. EMS reports that patient has a history of stomach cancer (currently in remission). Patient complaining of nausea/vomiting since last night.      88-year-old male with a history of stomach cancer presents the ER with abdominal pain has been going on for "a very long time" now associated with nausea and vomiting that has been going on for awhile as well.  Denies any fever.  No blood in stool or vomit.  Not ill appearing, talking in full sentences without issue.  No chest pain or shortness of breath.  Describes the pain as diffuse.  Was seen here a few weeks ago for same issue, full workup done including CT scan.  See results:    Impression:    1. Previous gastric surgery  2. New mild to moderate ascites around the liver and spleen and in the pericolic gutters and pelvis. There is some heterogeneity of the omentum anteriorly which could just be due to the fluid but cannot exclude malignant ascites or omental metastasis.  3. Diverticulosis but no diverticulitis  4. Cholecystectomy  5. Left hip hardware       Given Zofran by EMS prior to arrival      Review of patient's allergies indicates:   Allergen Reactions    Ativan [lorazepam] Anxiety     Patient had paradoxical effect after ativan IV and was so agitated he had to be restrained and transferred to ICU for precedex.     Past Medical History:   Diagnosis Date    Cancer     gastric    Dizziness     Encounter for blood transfusion     GI bleed     Hard of hearing     Hypertension     Insomnia     Personal history of fall     Stomach ulcer     Transfusion reaction     Tumor     removed from stomach     Past Surgical History:   Procedure Laterality Date    APPENDECTOMY      CHOLECYSTECTOMY      CLOSED REDUCTION OF INJURY OF HIP Left 02/09/2023    Procedure: CLOSED REDUCTION, HIP, LEFT;  Surgeon: Ck Collado MD;  Location: [facility] OR;  Service: Orthopedics;  " Laterality: Left;    ESOPHAGOGASTRODUODENOSCOPY N/A 05/31/2022    Procedure: EGD (ESOPHAGOGASTRODUODENOSCOPY);  Surgeon: Mario Escalera MD;  Location: [facility] ENDO;  Service: Endoscopy;  Laterality: N/A;    GASTRECTOMY      GASTRECTOMY N/A 07/12/2022    Procedure: GASTRECTOMY;  Surgeon: Jordan Montesinos MD;  Location: [facility] OR;  Service: General;  Laterality: N/A;  RN PREOP 6/29/2022   T/S DAY OF SURGERY (LIVES 110 MILES AWAY)  HAS PCP CLEAR.    KNEE SURGERY      PARTIAL GASTRECTOMY      PERCUTANEOUS PINNING OF FRACTURE Left 02/09/2023    Procedure: PINNING, FRACTURE, PERCUTANEOUS;  Surgeon: Ck Collado MD;  Location: [facility] OR;  Service: Orthopedics;  Laterality: Left;    SHOULDER SURGERY Right      Family History   Problem Relation Age of Onset    No Known Problems Mother     No Known Problems Father     Hypertension Sister     Uterine cancer Sister     No Known Problems Brother     No Known Problems Maternal Grandmother     No Known Problems Maternal Grandfather     No Known Problems Paternal Grandmother     No Known Problems Paternal Grandfather      Social History     Tobacco Use    Smoking status: Never    Smokeless tobacco: Current     Types: Chew   Substance Use Topics    Alcohol use: Yes     Alcohol/week: 2.0 standard drinks of alcohol     Types: 2 Shots of liquor per week    Drug use: Never     Review of Systems   Constitutional:  Negative for fever.   HENT:  Negative for sore throat.    Respiratory:  Negative for shortness of breath.    Cardiovascular:  Negative for chest pain.   Gastrointestinal:  Positive for abdominal pain and nausea.   Genitourinary:  Negative for dysuria.   Musculoskeletal:  Negative for back pain.   Skin:  Negative for rash.   Neurological:  Negative for weakness.   Hematological:  Does not bruise/bleed easily.   All other systems reviewed and are negative.      Physical Exam     Initial Vitals   BP Pulse Resp Temp SpO2   03/01/24 1015 03/01/24 1015 03/01/24 1015 03/01/24 1020  03/01/24 1015   137/86 98 18 97.3 °F (36.3 °C) 95 %      MAP       --                Physical Exam    Nursing note and vitals reviewed.  Constitutional: He appears well-developed and well-nourished. He is not diaphoretic. No distress.   HENT:   Head: Normocephalic and atraumatic.   Eyes: Conjunctivae and EOM are normal. Pupils are equal, round, and reactive to light. Right eye exhibits no discharge. Left eye exhibits no discharge. No scleral icterus.   Neck: Neck supple. No JVD present.   Normal range of motion.  Cardiovascular:  Normal rate, regular rhythm, normal heart sounds and intact distal pulses.           No murmur heard.  Pulmonary/Chest: Breath sounds normal. No stridor. No respiratory distress. He has no wheezes. He has no rhonchi. He has no rales. He exhibits no tenderness.   Abdominal: Abdomen is soft. Bowel sounds are normal. He exhibits no distension and no mass. There is abdominal tenderness.   Tenderness noted, no specific area of tenderness.  No rebound or guarding There is no rebound and no guarding.   Musculoskeletal:         General: No tenderness or edema. Normal range of motion.      Cervical back: Normal range of motion and neck supple.     Neurological: He is alert and oriented to person, place, and time. He has normal strength. GCS score is 15. GCS eye subscore is 4. GCS verbal subscore is 5. GCS motor subscore is 6.   Skin: Skin is warm and dry. Capillary refill takes less than 2 seconds.         ED Course   Procedures  Labs Reviewed   CBC W/ AUTO DIFFERENTIAL - Abnormal; Notable for the following components:       Result Value    RBC 4.39 (*)      (*)     MCH 34.4 (*)     Gran # (ANC) 9.0 (*)     Lymph # 0.4 (*)     Gran % 89.9 (*)     Lymph % 4.3 (*)     All other components within normal limits   COMPREHENSIVE METABOLIC PANEL - Abnormal; Notable for the following components:    CO2 22 (*)     BUN 26 (*)     Albumin 2.4 (*)     eGFR 53.5 (*)     All other components within normal  limits   LIPASE - Abnormal; Notable for the following components:    Lipase 12 (*)     All other components within normal limits   ALCOHOL,MEDICAL (ETHANOL)          Imaging Results    None          Medications - No data to display  Medical Decision Making  Amount and/or Complexity of Data Reviewed  Labs: ordered.               ED Course as of 03/01/24 1146   Fri Mar 01, 2024   1143 Discussed case with  - will place in observation for IV fluids due to mild dehydration, antiemetics. [SD]      ED Course User Index  [SD] Corey Lazaro MD               Medical Decision Making:   Differential Diagnosis:   Chronic abdominal pain, chronic nausea vomiting             Clinical Impression:  Final diagnoses:  [R11.2] Nausea and vomiting, unspecified vomiting type (Primary)  [E86.0] Dehydration          ED Disposition Condition    Observation Stable                Corey Lazaro MD  03/01/24 1146

## 2024-03-01 NOTE — PLAN OF CARE
Problem: Occupational Therapy  Goal: Occupational Therapy Goal  Description: Goals to be met by: 03/08/24     Patient will increase functional independence with ADLs by performing:    Feeding with Modified [place].  UE Dressing with Set-up Assistance.  LE Dressing with Set-up Assistance.  Grooming while seated at sink with Modified [place].  Toileting from toilet with Set-up Assistance for hygiene and clothing management.   Bathing from  shower chair/bench with Supervision.  Toilet transfer to toilet with Set-up Assistance.    3/1/2024 1725 by Stuart Miller, OT  Outcome: Established OT POC

## 2024-03-01 NOTE — PLAN OF CARE
Problem: Physical Therapy  Goal: Physical Therapy Goal  Description: Goals to be met by: 3/8/2024  Patient will increase functional independence with mobility by performin. Supine to sit with Modified Independent.  2. Sit to supine with Modified Independent.  3. Bed to chair transfer with Modified Independent with rolling walker using Step Transfer technique.  4. Sit to Stand with Modified Independent with rolling walker.  5. Gait  x 50  feet with Supervision or Set-up Assistance with rolling walker.  6. Lower extremity exercise program x10 reps.     Outcome: Plan of care established

## 2024-03-01 NOTE — H&P
"  [facility] Medicine  History & Physical    Patient Name: Rayo Duran  MRN: [id number]  Patient Class: OP- Observation  Admission Date: 3/1/2024  Attending Physician: Caroline Florez MD   Primary Care Provider: Caroline Florez MD         Patient information was obtained from patient, relative(s), past medical records, ER records, and primary team.     Subjective:     Principal Problem:Dehydration    Chief Complaint:   Chief Complaint   Patient presents with    Nausea     Arrives via [facility]. EMS reports that patient has a history of stomach cancer (currently in remission). Patient complaining of nausea/vomiting since last night.         HPI: ED HPI: 88-year-old male with a history of stomach cancer presents the ER with abdominal pain has been going on for "a very long time" now associated with nausea and vomiting that has been going on for awhile as well.  Denies any fever.  No blood in stool or vomit.  Not ill appearing, talking in full sentences without issue.  No chest pain or shortness of breath.  Describes the pain as diffuse.  Was seen here a few weeks ago for same issue, full workup done including CT scan.  See results:     Impression:     1. Previous gastric surgery  2. New mild to moderate ascites around the liver and spleen and in the pericolic gutters and pelvis. There is some heterogeneity of the omentum anteriorly which could just be due to the fluid but cannot exclude malignant ascites or omental metastasis.  3. Diverticulosis but no diverticulitis  4. Cholecystectomy  5. 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  Patient has been evaluated in the clinic twice within " the last month for urinary issues, weight loss, dehydration.  Patient was given 1 L of IV fluids during most recent clinic visit for dehydration, and had labs performed with Oncology later that afternoon.  Patient had a follow up with oncologist on 02/19/2024 and had a PET scan last week.  Patient's daughter who is present at bedside reports they have an upcoming appointment next Tuesday for the PET scan results.  Per oncology note, he does not feel patient would be an appropriate candidate for systemic palliative chemotherapy due to advanced age.  Labs obtained in the emergency department reviewed.  Patient noted with a mild dehydration, and reports improvement in abdominal pain and weakness after administration of IV fluids.  Patient reports poor appetite, minimal oral intake, severe weakness with recurrent falls.  Patient admitted for rehydration with IVF, antiemetics, monitoring.    Past Medical History:   Diagnosis Date    Cancer     gastric    Dizziness     Encounter for blood transfusion     GI bleed     Hard of hearing     Hypertension     Insomnia     Personal history of fall     Stomach ulcer     Transfusion reaction     Tumor     removed from stomach       Past Surgical History:   Procedure Laterality Date    APPENDECTOMY      CHOLECYSTECTOMY      CLOSED REDUCTION OF INJURY OF HIP Left 02/09/2023    Procedure: CLOSED REDUCTION, HIP, LEFT;  Surgeon: Ck Collado MD;  [facility];  Service: Orthopedics;  Laterality: Left;    ESOPHAGOGASTRODUODENOSCOPY N/A 05/31/2022    Procedure: EGD (ESOPHAGOGASTRODUODENOSCOPY);  Surgeon: Mario Escalera MD;  Location: [facility];  Service: Endoscopy;  Laterality: N/A;    GASTRECTOMY      GASTRECTOMY N/A 07/12/2022    Procedure: GASTRECTOMY;  Surgeon: Jordan Montesinos MD;  Location: [facility] OR;  Service: General;  Laterality: N/A;  RN PREOP 6/29/2022   T/S DAY OF SURGERY (LIVES 110 MILES AWAY)  HAS PCP CLEAR.    KNEE SURGERY      PARTIAL GASTRECTOMY      PERCUTANEOUS  PINNING OF FRACTURE Left 02/09/2023    Procedure: PINNING, FRACTURE, PERCUTANEOUS;  Surgeon: Ck Collado MD;  Location: [facility];  Service: Orthopedics;  Laterality: Left;    SHOULDER SURGERY Right        Review of patient's allergies indicates:   Allergen Reactions    Ativan [lorazepam] Anxiety     Patient had paradoxical effect after ativan IV and was so agitated he had to be restrained and transferred to ICU for precedex.       No current facility-administered medications on file prior to encounter.     Current Outpatient Medications on File Prior to Encounter   Medication Sig    diclofenac sodium (VOLTAREN) 1 % Gel Apply 2 g topically 4 (four) times daily as needed (pain). (Patient not taking: Reported on 2/19/2024)    dicyclomine (BENTYL) 20 mg tablet Take 1 tablet (20 mg total) by mouth 2 (two) times daily. (Patient not taking: Reported on 2/19/2024)    HYDROcodone-acetaminophen (NORCO) 5-325 mg per tablet Take 1 tablet by mouth every 6 (six) hours as needed for Pain. (Patient not taking: Reported on 2/19/2024)    hydrOXYzine HCL (ATARAX) 25 MG tablet Take 1 tablet (25 mg total) by mouth 3 (three) times daily as needed for Anxiety. (Patient not taking: Reported on 2/19/2024)    ibuprofen (ADVIL,MOTRIN) 600 MG tablet Take 1 tablet (600 mg total) by mouth every 6 (six) hours as needed for Pain. (Patient not taking: Reported on 2/19/2024)    megestroL (MEGACE) 40 MG Tab Take 1 tablet (40 mg total) by mouth once daily. (Patient not taking: Reported on 2/19/2024.)    mupirocin (BACTROBAN) 2 % ointment Apply topically 2 (two) times daily. (Patient not taking: Reported on 2/19/2024)    ondansetron (ZOFRAN) 4 MG tablet Take 1 tablet (4 mg total) by mouth every 8 (eight) hours as needed for Nausea. (Patient not taking: Reported on 2/19/2024)    ondansetron (ZOFRAN) 4 MG tablet Take 1 tablet (4 mg total) by mouth every 6 (six) hours as needed. (Patient not taking: Reported on 2/19/2024)    ondansetron (ZOFRAN-ODT) 4  MG TbDL Take 1 tablet (4 mg total) by mouth 3 (three) times daily as needed (nausea). (Patient not taking: Reported on 2/19/2024)    pantoprazole (PROTONIX) 40 MG tablet Take 1 tablet (40 mg total) by mouth once daily. (Patient not taking: Reported on 2/1/2024)    tamsulosin (FLOMAX) 0.4 mg Cap Take 1 capsule (0.4 mg total) by mouth once daily. (Patient not taking: Reported on 2/19/2024)    triamcinolone acetonide 0.1% (KENALOG) 0.1 % cream Apply topically 2 (two) times daily. (Patient not taking: Reported on 2/19/2024)     Family History       Problem Relation (Age of Onset)    Hypertension Sister    No Known Problems Mother, Father, Brother, Maternal Grandmother, Maternal Grandfather, Paternal Grandmother, Paternal Grandfather    Uterine cancer Sister          Tobacco Use    Smoking status: Never    Smokeless tobacco: Current     Types: Chew   Substance and Sexual Activity    Alcohol use: Yes     Alcohol/week: 2.0 standard drinks of alcohol     Types: 2 Shots of liquor per week    Drug use: Never    Sexual activity: Not Currently     Review of Systems   Constitutional:  Positive for fatigue. Negative for activity change, appetite change, chills and fever.   HENT:  Negative for ear pain, mouth sores, nosebleeds and sore throat.    Eyes:  Negative for visual disturbance.   Respiratory:  Negative for cough, shortness of breath and wheezing.    Cardiovascular:  Negative for chest pain, palpitations and leg swelling.   Gastrointestinal:  Positive for abdominal pain, nausea and vomiting. Negative for abdominal distention, blood in stool, constipation and diarrhea.   Endocrine: Negative for polyphagia.   Genitourinary:  Negative for difficulty urinating, dysuria, flank pain and frequency.   Musculoskeletal:  Positive for joint swelling and myalgias. Negative for arthralgias and back pain.   Skin:  Positive for wound. Negative for rash.   Neurological:  Positive for weakness. Negative for dizziness, tremors, seizures,  syncope, facial asymmetry, speech difficulty and headaches.   Hematological:  Negative for adenopathy.   Psychiatric/Behavioral:  Positive for confusion. Negative for agitation and hallucinations. The patient is nervous/anxious.      Objective:     Vital Signs (Most Recent):  Temp: 97.3 °F (36.3 °C) (03/01/24 1020)  Pulse: 98 (03/01/24 1015)  Resp: 18 (03/01/24 1015)  BP: 137/86 (03/01/24 1015)  SpO2: 95 % (03/01/24 1015) Vital Signs (24h Range):  Temp:  [97.3 °F (36.3 °C)] 97.3 °F (36.3 °C)  Pulse:  [98] 98  Resp:  [18] 18  SpO2:  [95 %] 95 %  BP: (137)/(86) 137/86     Weight: 61.7 kg (136 lb)  Body mass index is 19.51 kg/m².     Physical Exam  Constitutional:       General: He is awake. He is not in acute distress.     Appearance: He is cachectic. He is ill-appearing (Frail, chronically ill-appearing). He is not toxic-appearing.      Comments: Tobacco stains under fingernails   HENT:      Head: Normocephalic and atraumatic.      Right Ear: Decreased hearing noted.      Left Ear: Decreased hearing noted.      Nose: No congestion or rhinorrhea.      Mouth/Throat:      Pharynx: No oropharyngeal exudate.      Comments: Tobacco stains to dentures  Eyes:      General: No scleral icterus.  Neck:      Vascular: No carotid bruit.   Cardiovascular:      Rate and Rhythm: Normal rate and regular rhythm.      Pulses: Normal pulses.      Heart sounds: Normal heart sounds. No murmur heard.     No friction rub. No gallop.   Pulmonary:      Effort: Pulmonary effort is normal. No respiratory distress.      Breath sounds: Normal breath sounds. No stridor. No wheezing, rhonchi or rales.   Chest:      Chest wall: No tenderness.   Abdominal:      General: There is no distension.      Palpations: There is no mass.      Tenderness: There is abdominal tenderness (diffuse tenderness to palpation). There is no right CVA tenderness, left CVA tenderness, guarding or rebound.      Hernia: No hernia is present.   Musculoskeletal:          General: No swelling, tenderness or deformity.      Cervical back: No rigidity.      Right lower leg: No edema.      Left lower leg: No edema.   Lymphadenopathy:      Cervical: No cervical adenopathy.   Skin:     General: Skin is warm and dry.      Capillary Refill: Capillary refill takes less than 2 seconds.      Coloration: Skin is pale. Skin is not jaundiced.      Findings: Bruising present. No rash.   Neurological:      Mental Status: He is alert. Mental status is at baseline.      Motor: Weakness present.      Gait: Gait abnormal.                Significant Labs: All pertinent labs within the past 24 hours have been reviewed.  CBC:   Recent Labs   Lab 03/01/24  1032   WBC 10.02   HGB 15.1   HCT 46.1        CMP:   Recent Labs   Lab 03/01/24  1032      K 4.1      CO2 22*      BUN 26*   CREATININE 1.3   CALCIUM 9.4   PROT 7.1   ALBUMIN 2.4*   BILITOT 1.0   ALKPHOS 113   AST 13   ALT 10   ANIONGAP 11       Significant Imaging: I have reviewed all pertinent imaging results/findings within the past 24 hours.  Assessment/Plan:     * Dehydration  Patient noted with mild dehydration on labs.  Patient admitted for IV fluids, antiemetics.      Frequent falls  Consulting therapy services.      Hard of hearing  Noted.  Safety precautions.      Malignant neoplasm of body of stomach  S/p partial gastrectomy 07/12/2022.  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  Patient had follow up with oncologist on 02/19/2024 and had a PET-CT performed last week.  Patient has upcoming appointment on Tuesday to receive the results of the PET-CT.  Due to advanced age, patient will be a poor candidate for palliative systemic chemotherapy.      HTN (hypertension), benign  Chronic, controlled. Latest blood pressure and vitals reviewed-     Temp:  [97.3 °F (36.3 °C)-97.8 °F (36.6 °C)]   Pulse:  [94-98]   Resp:  [18-20]   BP: (135-137)/(80-86)   SpO2:  [95 %] .       VTE Risk  Mitigation (From admission, onward)           Ordered     IP VTE HIGH RISK PATIENT  Once         03/01/24 1315     Place sequential compression device  Until discontinued         03/01/24 1315                         On 03/01/2024, patient should be placed in hospital observation services under my care in collaboration with Dr. Florez.           FITO GRANADOS  Department of Hospital Medicine  [facility]

## 2024-03-01 NOTE — SUBJECTIVE & OBJECTIVE
Past Medical History:   Diagnosis Date    Cancer     gastric    Dizziness     Encounter for blood transfusion     GI bleed     Hard of hearing     Hypertension     Insomnia     Personal history of fall     Stomach ulcer     Transfusion reaction     Tumor     removed from stomach       Past Surgical History:   Procedure Laterality Date    APPENDECTOMY      CHOLECYSTECTOMY      CLOSED REDUCTION OF INJURY OF HIP Left 02/09/2023    Procedure: CLOSED REDUCTION, HIP, LEFT;  Surgeon: Ck Collado MD;  Location: [facility] OR;  Service: Orthopedics;  Laterality: Left;    ESOPHAGOGASTRODUODENOSCOPY N/A 05/31/2022    Procedure: EGD (ESOPHAGOGASTRODUODENOSCOPY);  Surgeon: Mario Escalera MD;  Location: [facility] ENDO;  Service: Endoscopy;  Laterality: N/A;    GASTRECTOMY      GASTRECTOMY N/A 07/12/2022    Procedure: GASTRECTOMY;  Surgeon: Jordan Montesinos MD;  Location: [facility] OR;  Service: General;  Laterality: N/A;  RN PREOP 6/29/2022   T/S DAY OF SURGERY (LIVES 110 MILES AWAY)  HAS PCP CLEAR.    KNEE SURGERY      PARTIAL GASTRECTOMY      PERCUTANEOUS PINNING OF FRACTURE Left 02/09/2023    Procedure: PINNING, FRACTURE, PERCUTANEOUS;  Surgeon: Ck Collado MD;  Location: [facility] OR;  Service: Orthopedics;  Laterality: Left;    SHOULDER SURGERY Right        Review of patient's allergies indicates:   Allergen Reactions    Ativan [lorazepam] Anxiety     Patient had paradoxical effect after ativan IV and was so agitated he had to be restrained and transferred to ICU for precedex.       No current facility-administered medications on file prior to encounter.     Current Outpatient Medications on File Prior to Encounter   Medication Sig    diclofenac sodium (VOLTAREN) 1 % Gel Apply 2 g topically 4 (four) times daily as needed (pain). (Patient not taking: Reported on 2/19/2024)    dicyclomine (BENTYL) 20 mg tablet Take 1 tablet (20 mg total) by mouth 2 (two) times daily. (Patient not taking: Reported on 2/19/2024)    HYDROcodone-acetaminophen  (NORCO) 5-325 mg per tablet Take 1 tablet by mouth every 6 (six) hours as needed for Pain. (Patient not taking: Reported on 2/19/2024)    hydrOXYzine HCL (ATARAX) 25 MG tablet Take 1 tablet (25 mg total) by mouth 3 (three) times daily as needed for Anxiety. (Patient not taking: Reported on 2/19/2024)    ibuprofen (ADVIL,MOTRIN) 600 MG tablet Take 1 tablet (600 mg total) by mouth every 6 (six) hours as needed for Pain. (Patient not taking: Reported on 2/19/2024)    megestroL (MEGACE) 40 MG Tab Take 1 tablet (40 mg total) by mouth once daily. (Patient not taking: Reported on 2/19/2024.)    mupirocin (BACTROBAN) 2 % ointment Apply topically 2 (two) times daily. (Patient not taking: Reported on 2/19/2024)    ondansetron (ZOFRAN) 4 MG tablet Take 1 tablet (4 mg total) by mouth every 8 (eight) hours as needed for Nausea. (Patient not taking: Reported on 2/19/2024)    ondansetron (ZOFRAN) 4 MG tablet Take 1 tablet (4 mg total) by mouth every 6 (six) hours as needed. (Patient not taking: Reported on 2/19/2024)    ondansetron (ZOFRAN-ODT) 4 MG TbDL Take 1 tablet (4 mg total) by mouth 3 (three) times daily as needed (nausea). (Patient not taking: Reported on 2/19/2024)    pantoprazole (PROTONIX) 40 MG tablet Take 1 tablet (40 mg total) by mouth once daily. (Patient not taking: Reported on 2/1/2024)    tamsulosin (FLOMAX) 0.4 mg Cap Take 1 capsule (0.4 mg total) by mouth once daily. (Patient not taking: Reported on 2/19/2024)    triamcinolone acetonide 0.1% (KENALOG) 0.1 % cream Apply topically 2 (two) times daily. (Patient not taking: Reported on 2/19/2024)     Family History       Problem Relation (Age of Onset)    Hypertension Sister    No Known Problems Mother, Father, Brother, Maternal Grandmother, Maternal Grandfather, Paternal Grandmother, Paternal Grandfather    Uterine cancer Sister          Tobacco Use    Smoking status: Never    Smokeless tobacco: Current     Types: Chew   Substance and Sexual Activity    Alcohol  use: Yes     Alcohol/week: 2.0 standard drinks of alcohol     Types: 2 Shots of liquor per week    Drug use: Never    Sexual activity: Not Currently     Review of Systems   Constitutional:  Positive for fatigue. Negative for activity change, appetite change, chills and fever.   HENT:  Negative for ear pain, mouth sores, nosebleeds and sore throat.    Eyes:  Negative for visual disturbance.   Respiratory:  Negative for cough, shortness of breath and wheezing.    Cardiovascular:  Negative for chest pain, palpitations and leg swelling.   Gastrointestinal:  Positive for abdominal pain, nausea and vomiting. Negative for abdominal distention, blood in stool, constipation and diarrhea.   Endocrine: Negative for polyphagia.   Genitourinary:  Negative for difficulty urinating, dysuria, flank pain and frequency.   Musculoskeletal:  Positive for joint swelling and myalgias. Negative for arthralgias and back pain.   Skin:  Positive for wound. Negative for rash.   Neurological:  Positive for weakness. Negative for dizziness, tremors, seizures, syncope, facial asymmetry, speech difficulty and headaches.   Hematological:  Negative for adenopathy.   Psychiatric/Behavioral:  Positive for confusion. Negative for agitation and hallucinations. The patient is nervous/anxious.      Objective:     Vital Signs (Most Recent):  Temp: 97.3 °F (36.3 °C) (03/01/24 1020)  Pulse: 98 (03/01/24 1015)  Resp: 18 (03/01/24 1015)  BP: 137/86 (03/01/24 1015)  SpO2: 95 % (03/01/24 1015) Vital Signs (24h Range):  Temp:  [97.3 °F (36.3 °C)] 97.3 °F (36.3 °C)  Pulse:  [98] 98  Resp:  [18] 18  SpO2:  [95 %] 95 %  BP: (137)/(86) 137/86     Weight: 61.7 kg (136 lb)  Body mass index is 19.51 kg/m².     Physical Exam  Constitutional:       General: He is awake. He is not in acute distress.     Appearance: He is cachectic. He is ill-appearing (Frail, chronically ill-appearing). He is not toxic-appearing.      Comments: Tobacco stains under fingernails   HENT:       Head: Normocephalic and atraumatic.      Right Ear: Decreased hearing noted.      Left Ear: Decreased hearing noted.      Nose: No congestion or rhinorrhea.      Mouth/Throat:      Pharynx: No oropharyngeal exudate.      Comments: Tobacco stains to dentures  Eyes:      General: No scleral icterus.  Neck:      Vascular: No carotid bruit.   Cardiovascular:      Rate and Rhythm: Normal rate and regular rhythm.      Pulses: Normal pulses.      Heart sounds: Normal heart sounds. No murmur heard.     No friction rub. No gallop.   Pulmonary:      Effort: Pulmonary effort is normal. No respiratory distress.      Breath sounds: Normal breath sounds. No stridor. No wheezing, rhonchi or rales.   Chest:      Chest wall: No tenderness.   Abdominal:      General: There is no distension.      Palpations: There is no mass.      Tenderness: There is abdominal tenderness (diffuse tenderness to palpation). There is no right CVA tenderness, left CVA tenderness, guarding or rebound.      Hernia: No hernia is present.   Musculoskeletal:         General: No swelling, tenderness or deformity.      Cervical back: No rigidity.      Right lower leg: No edema.      Left lower leg: No edema.   Lymphadenopathy:      Cervical: No cervical adenopathy.   Skin:     General: Skin is warm and dry.      Capillary Refill: Capillary refill takes less than 2 seconds.      Coloration: Skin is pale. Skin is not jaundiced.      Findings: Bruising present. No rash.   Neurological:      Mental Status: He is alert. Mental status is at baseline.      Motor: Weakness present.      Gait: Gait abnormal.                Significant Labs: All pertinent labs within the past 24 hours have been reviewed.  CBC:   Recent Labs   Lab 03/01/24  1032   WBC 10.02   HGB 15.1   HCT 46.1        CMP:   Recent Labs   Lab 03/01/24  1032      K 4.1      CO2 22*      BUN 26*   CREATININE 1.3   CALCIUM 9.4   PROT 7.1   ALBUMIN 2.4*   BILITOT 1.0   ALKPHOS 113    AST 13   ALT 10   ANIONGAP 11       Significant Imaging: I have reviewed all pertinent imaging results/findings within the past 24 hours.

## 2024-03-01 NOTE — PT/OT/SLP EVAL
"Physical Therapy Evaluation     Patient Name: Rayo Duran   MRN: [id number]  Recent Surgery: * No surgery found *      Recommendations:     Discharge Recommendations: Low Intensity Therapy   Discharge Equipment Recommendations: none   Barriers to discharge: None    Assessment:     Rayo Duran is a 88 y.o. male admitted with a medical diagnosis of Dehydration. He presents with the following impairments/functional limitations: weakness, impaired joint extensibility, impaired endurance, impaired muscle length, impaired functional mobility, decreased lower extremity function, decreased upper extremity function, gait instability, impaired cardiopulmonary response to activity, impaired balance, pain. Patient reports that he walks with a "stick" at home and he gets fatigue with short distances walking, sitting in between walks inside the house. At the time of evaluation, patient is [place] and c/o LE pain which he did not quantify.  He required set up assistance with supine <> sit, CGA with sit <> stand using a RW, ambulated ~25 feet with RW CGA, c/o fatigue and request to go back to bed to sit down and rest. P.T. will work on strengthening and improving functional endurance while patient is in the hospital.  No DME needed upon discharge, follow up with home health P.T. once medically stable.     Rehab Prognosis: Fair; patient would benefit from acute PT services to address these deficits and reach maximum level of function.    Plan:     During this hospitalization, patient to be seen 5 x/week to address the above listed problems via gait training, therapeutic activities, therapeutic exercises, neuromuscular re-education    Plan of Care Expires: 03/08/24    Subjective     Chief Complaint: "My legs hurt."   Patient Comments/Goals: Go home.   Pain/Comfort:  Pain Rating 1:  (did not quantify)  Location - Side 1: Bilateral  Location 1: leg  Pain Addressed 1: Reposition, Distraction, Cessation of Activity, Nurse notified  Pain " "Rating Post-Intervention 1:  (did not quantify)    Social History:  Living Environment: Patient lives with their spouse and child(kristin) in a 2 story home with  chair lift   Prior Level of Function: Prior to admission, patient ambulated household distances using "stick"   Equipment Used at Home: walker, rolling, shower chair, grab bar, other (see comments) (chair lift for the stairs)  DME owned (not currently used): none  Assistance Upon Discharge: family    Objective:     Communicated with nurse and patient  prior to session. Patient found HOB elevated with peripheral IV upon PT entry to room.    General Precautions: Standard, fall, hearing impaired   Orthopedic Precautions: N/A   Braces: N/A    Respiratory Status: Room air    Exams:  Cognition: Patient is oriented to Person, Place, and Situation  RLE ROM: WFL  RLE Strength: WFL  LLE ROM: WFL  LLE Strength: WFL  Gross Motor Coordination: WFL  Skin Integrity/Edema:     -       Skin integrity: Visible skin intact    Functional Mobility:  Gait belt applied - Yes  Bed Mobility  Rolling Left: stand by assistance  Rolling Right: stand by assistance  Scooting: stand by assistance  Supine to Sit: stand by assistance for LE management and trunk management  Sit to Supine: stand by assistance for LE management and trunk management  Transfers  Sit to Stand: contact guard assistance with rolling walker and with cues for hand placement and foot placement  Gait  Patient ambulated ~25 feet  with rolling walker and contact guard assistance. Patient demonstrates steady gait and decreased step length. C/o fatigue limiting patient's distance ambulated. . All lines remained intact throughout ambulation trail.  Balance  Sitting: supervision  Standing: stand by assistance      Therapeutic Activities and Exercises:   Patient educated on role of acute care PT and PT POC, safety while in hospital including calling nurse for mobility, and call light usage  Patient educated about importance of " OOB mobility and remaining up in chair most of the day.      AM-PAC 6 CLICK MOBILITY  Total Score:18    Patient left HOB elevated with all lines intact, call button in reach, RN notified, and bed alarm on.    GOALS:   Multidisciplinary Problems       Physical Therapy Goals          Problem: Physical Therapy    Goal Priority Disciplines Outcome Goal Variances Interventions   Physical Therapy Goal     PT, PT/OT Ongoing, Progressing     Description: Goals to be met by: 3/8/2024  Patient will increase functional independence with mobility by performin. Supine to sit with Modified Independent.  2. Sit to supine with Modified Independent.  3. Bed to chair transfer with Modified Independent with rolling walker using Step Transfer technique.  4. Sit to Stand with Modified Independent with rolling walker.  5. Gait  x 50  feet with Supervision or Set-up Assistance with rolling walker.  6. Lower extremity exercise program x10 reps.                          History:     Past Medical History:   Diagnosis Date    Cancer     gastric    Dizziness     Encounter for blood transfusion     GI bleed     Hard of hearing     Hypertension     Insomnia     Personal history of fall     Stomach ulcer     Transfusion reaction     Tumor     removed from stomach       Past Surgical History:   Procedure Laterality Date    APPENDECTOMY      CHOLECYSTECTOMY      CLOSED REDUCTION OF INJURY OF HIP Left 2023    Procedure: CLOSED REDUCTION, HIP, LEFT;  Surgeon: Ck Collado MD;  Location: [facility] OR;  Service: Orthopedics;  Laterality: Left;    ESOPHAGOGASTRODUODENOSCOPY N/A 2022    Procedure: EGD (ESOPHAGOGASTRODUODENOSCOPY);  Surgeon: Mario Escalera MD;  Location: [facility];  Service: Endoscopy;  Laterality: N/A;    GASTRECTOMY      GASTRECTOMY N/A 2022    Procedure: GASTRECTOMY;  Surgeon: Jordan Montesinos MD;  Location: [facility] OR;  Service: General;  Laterality: N/A;  RN PREOP 2022   T/S DAY OF SURGERY (LIVES 110 MIL[address]  AWAY)  HAS PCP CLEAR.    KNEE SURGERY      PARTIAL GASTRECTOMY      PERCUTANEOUS PINNING OF FRACTURE Left 02/09/2023    Procedure: PINNING, FRACTURE, PERCUTANEOUS;  Surgeon: Ck Collado MD;  Location: [facility];  Service: Orthopedics;  Laterality: Left;    SHOULDER SURGERY Right        Time Tracking:     PT Received On: 03/01/24  PT Start Time: 1600  PT Stop Time: 1623  PT Total Time (min): 23 min     Billable Minutes: Evaluation low complexity     3/1/2024

## 2024-03-01 NOTE — ASSESSMENT & PLAN NOTE
S/p partial gastrectomy 07/12/2022.  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  Patient had follow up with oncologist on 02/19/2024 and had a PET-CT performed last week.  Patient has upcoming appointment on Tuesday to receive the results of the PET-CT.  Due to advanced age, patient will be a poor candidate for palliative systemic chemotherapy.

## 2024-03-01 NOTE — PLAN OF CARE
[facility]  Initial Discharge Assessment       Primary Care Provider: Caroline Florez MD    Admission Diagnosis: Dehydration [E86.0]  Nausea and vomiting, unspecified vomiting type [R11.2]    Admission Date: 3/1/2024  Expected Discharge Date:          Payor: MEDICARE / Plan: MEDICARE PART A & B / Product Type: Government /     Extended Emergency Contact Information  Primary Emergency Contact: PAMELA REYNA  Mobile Phone: [phone]  Relation: Spouse  Preferred language: English  Secondary Emergency Contact: Murieldexters,Kinga FaithNazario  Mobile Phone: [phone]  Relation: Son  Preferred language: English   needed? No    Discharge Plan A: Home with family  Discharge Plan B: Home with family, Home Health      [facility] [address] [place] [id number]  Phone: [phone] Fax: [phone]    [facility] [address]  [address]   [facility] [address]  Phone: [phone] Fax: [phone]    [id number] - [place], [place] - [address]  [address]  [facility] [address]  Phone: [phone] Fax: [phone]    [facility] [place], [place] - [address] [place] [address] [address]VD AT [address]  [address]  [address]  [facility] [address]  Phone: [phone] Fax: [phone]      Initial Assessment (most recent)       Adult Discharge Assessment - 03/01/24 1331          Discharge Assessment    Assessment Type Discharge Planning Assessment     Confirmed/corrected address, phone number and insurance Yes     Source of Information family;patient     Does patient/caregiver understand observation status Yes     Communicated PHILLIP with patient/caregiver Date not available/Unable to determine     Reason For Admission nausea vomiting     People in Home significant other;child(kristin), adult     Facility Arrived From: home     Do you  expect to return to your current living situation? Yes     Do you have help at home or someone to help you manage your care at home? --   family    Prior to hospitilization cognitive status: Alert/Oriented     Current cognitive status: Alert/Oriented     Walking or Climbing Stairs Difficulty yes     Walking or Climbing Stairs ambulation difficulty, requires equipment     Mobility Management has walker     Dressing/Bathing Difficulty no     Home Layout Able to live on 1st floor     Equipment Currently Used at Home walker, rolling;shower chair;other (see comments);grab bar   chair lift    Readmission within 30 days? No     Patient currently being followed by outpatient case management? Unable to determine (comments)     Do you currently have service(s) that help you manage your care at home? No     Do you take prescription medications? Yes     Do you have any problems affording any of your prescribed medications? No     Is the patient taking medications as prescribed? --   not known    Who is going to help you get home at discharge? family     How do you get to doctors appointments? family or friend will provide     Are you on dialysis? No     Do you take coumadin? No     Discharge Plan A Home with family     Discharge Plan B Home with family;Home Health     DME Needed Upon Discharge  none     Discharge Plan discussed with: POA;Adult children     Name(s) and Number(s) Daughter Diann Brown [phone]                 Patient awake and alert, conversation with patient and daughter.  Patient is hard of hearing.  Daughter states patient lives with his wife and daughter, Diann Brown, lives in the home with them.  She states patient is ambulatory with walker, continent of bowel and bladder, able to navigate to bathroom for hygiene needs.  Has walker, grab bars, shower chair, two story house, but patient resides downstairs, however a chair lift is available if he wants to go upstairs.  Patient plans to return home  upon discharge, daughter stating patient will probably not want home health to come at time of discharge.  Daughter prepares medications, places in daily containers, and wife dispenses medications to patient.  Likely plan will be to discharge to home.

## 2024-03-01 NOTE — ASSESSMENT & PLAN NOTE
Chronic, controlled. Latest blood pressure and vitals reviewed-     Temp:  [97.3 °F (36.3 °C)-97.8 °F (36.6 °C)]   Pulse:  [94-98]   Resp:  [18-20]   BP: (135-137)/(80-86)   SpO2:  [95 %] .

## 2024-03-01 NOTE — PT/OT/SLP EVAL
"Occupational Therapy   Evaluation    Name: Rayo Duran  MRN: [id number]  Admitting Diagnosis: Dehydration  Recent Surgery: * No surgery found *      Recommendations:     Discharge Recommendations: Low Intensity Therapy  Discharge Equipment Recommendations:  none  Barriers to discharge:  Other (Comment) (Medical status)    Assessment:     Rayo Duran is a 88 y.o. male with a medical diagnosis of Dehydration.  He presents with functional deficits impacting independence with ADL's including functional mobility. Performance deficits affecting function: weakness, impaired endurance, impaired self care skills, impaired functional mobility, impaired balance, decreased safety awareness, pain, decreased ROM, impaired cardiopulmonary response to activity, impaired joint extensibility, impaired muscle length, decreased upper extremity function, decreased lower extremity function.      Rehab Prognosis: Fair; patient would benefit from acute skilled OT services to address these deficits and reach maximum level of function.       Plan:     Patient to be seen 5 x/week to address the above listed problems via self-care/home management, therapeutic activities, therapeutic exercises  Plan of Care Expires: 03/08/24  Plan of Care Reviewed with: patient    Subjective     Chief Complaint: Pt stated, "I'm not here because I can't walk".   Patient/Family Comments/goals: Pt would like to return home with family.     Occupational Profile:  Living Environment: Pt lives with spouse and daughter in a two-story home.  Previous level of function: Supervision to Modified Independent  Roles and Routines: ADL's  Equipment Used at Home: walker, rolling, shower chair, grab bar  Assistance upon Discharge: Family    Pain/Comfort:  Pain Rating 1: 8/10  Location - Orientation 1: generalized  Location 1: abdomen  Pain Addressed 1: Reposition, Distraction, Nurse notified  Pain Rating Post-Intervention 1: 5/10    Patients cultural, spiritual, Yarsanism " conflicts given the current situation: no    Objective:     Communicated with: nurse prior to session.  Patient found HOB elevated with peripheral IV upon OT entry to room.    General Precautions: Standard, fall, hearing impaired  Orthopedic Precautions: N/A  Braces: N/A  Respiratory Status: Room air    Occupational Performance:    Bed Mobility:    Patient completed Rolling/Turning to Left with  stand by assistance  Patient completed Rolling/Turning to Right with stand by assistance  Patient completed Scooting/Bridging with stand by assistance  Patient completed Supine to Sit with stand by assistance  Patient completed Sit to Supine with stand by assistance    Functional Mobility/Transfers:  Patient completed Sit <> Stand Transfer with contact guard assistance  with  rolling walker   Patient completed Bed <> Chair Transfer using Step Transfer technique with contact guard assistance with rolling walker  Patient completed Toilet Transfer Step Transfer technique with contact guard assistance with  grab bars  Functional Mobility: Pt ambulated 19' between surfaces requiring CGA utilizing RW.    Activities of Daily Living:  Feeding:  setup assistance    Grooming: setup assistance     Bathing: minimum assistance    Upper Body Dressing: supervision    Lower Body Dressing: minimum assistance    Toileting: minimum assistance      Cognitive/Visual Perceptual:  Cognitive/Psychosocial Skills:  -       Oriented to: Person, Place, and Situation   -       Follows Commands/attention:Follows two-step commands  -       Communication: anomia  -       Memory: Impaired STM  -       Safety awareness/insight to disability: impaired   -       Mood/Affect/Coping skills/emotional control: Cooperative    Physical Exam:  Postural examination/scapula alignment: -       Rounded shoulders  -       Forward head  Sensation: -       Intact  light/touch bilateral UE's  Dominant hand: -       Right  Upper Extremity Range of Motion:  -       Right Upper  Extremity: WFL  -       Left Upper Extremity: WFL  Upper Extremity Strength: -       Right Upper Extremity: 3+ to 4/5  -       Left Upper Extremity: 3+ to 4/5  Fine Motor Coordination: -       Intact  Left hand, manipulation of objects and Right hand, manipulation of objects  Gross motor coordination: Impaired bilateral hand-eye coordination    AMPAC 6 Click ADL:  AMPAC Total Score: 21    Treatment & Education:  Pt was provided education / instruction regarding role of OT and established OT POC.    Patient left HOB elevated with all lines intact, call button in reach, and nurse notified    GOALS:   Goals to be met by: 03/08/24     Patient will increase functional independence with ADLs by performing:    Feeding with Modified [place].  UE Dressing with Set-up Assistance.  LE Dressing with Set-up Assistance.  Grooming while seated at sink with Modified [place].  Toileting from toilet with Set-up Assistance for hygiene and clothing management.   Bathing from  shower chair/bench with Supervision.  Toilet transfer to toilet with Set-up Assistance.      History:     Past Medical History:   Diagnosis Date    Cancer     gastric    Dizziness     Encounter for blood transfusion     GI bleed     Hard of hearing     Hypertension     Insomnia     Personal history of fall     Stomach ulcer     Transfusion reaction     Tumor     removed from stomach         Past Surgical History:   Procedure Laterality Date    APPENDECTOMY      CHOLECYSTECTOMY      CLOSED REDUCTION OF INJURY OF HIP Left 02/09/2023    Procedure: CLOSED REDUCTION, HIP, LEFT;  Surgeon: Ck Collado MD;  Location: [facility] OR;  Service: Orthopedics;  Laterality: Left;    ESOPHAGOGASTRODUODENOSCOPY N/A 05/31/2022    Procedure: EGD (ESOPHAGOGASTRODUODENOSCOPY);  Surgeon: Mario Ecsalera MD;  Location: [facility];  Service: Endoscopy;  Laterality: N/A;    GASTRECTOMY      GASTRECTOMY N/A 07/12/2022    Procedure: GASTRECTOMY;  Surgeon: Jordan Montesinos MD;   Location: [facility] OR;  Service: General;  Laterality: N/A;  RN PREOP 6/29/2022   T/S DAY OF SURGERY (LIVES [address] AWAY)  HAS PCP CLEAR.    KNEE SURGERY      PARTIAL GASTRECTOMY      PERCUTANEOUS PINNING OF FRACTURE Left 02/09/2023    Procedure: PINNING, FRACTURE, PERCUTANEOUS;  Surgeon: Ck Collado MD;  [facility];  Service: Orthopedics;  Laterality: Left;    SHOULDER SURGERY Right        Time Tracking:     OT Date of Treatment: 03/01/24  OT Start Time: 1635  OT Stop Time: 1702  OT Total Time (min): 27 min    Billable Minutes:Evaluation 27    3/1/2024

## 2024-03-01 NOTE — PLAN OF CARE
03/01/24 1330   ANTON Message   Medicare Outpatient and Observation Notification regarding financial responsibility Explained to patient/caregiver;Signed/date by patient/caregiver   Date ANTON was signed 03/01/24   Time ANTON was signed 1330     Awake alert.  Daughter at bedside.  Explained Observation status to daughter and patient.  Reviewed Medicare Observation Notice with patient.  Patient signs ANTON.

## 2024-03-02 NOTE — NURSING
"ATTEMPTING TO CHANGE PATIENT BECAUSE HE HAD A SOILED DIAPER FROM [employer] GIVEN EARLIER BY THIS NURSE.  SON IN ROOM USING FOUL LANGUAGE AND CURSING AT PCT AND SAYING "LEAVE HIM  THE "FU&&" ALONE"  THEN WHEN THIS NURSE WALKED INTO PATIENT'S ROOM, THE PATIENT'S SON STARTED CURSING AGAIN.  THIS NURSE IGNORED THE PATIENT'S SON AND COMPLETED FAISAL-CARE ON PATIENT.  PATIENT VERY THANKFUL FOR ASSISTANCE.  NOTIFIED CHARGE NURSE, PONCHO FU OF INCIDENT WITH PATIENT'S SON.   GREGG HANSON LPN  "

## 2024-03-02 NOTE — NURSING
"Patient complaining of stomach pain. Stated "He needs something for pain so he can sleep" contacted Dr. Roberts. PRN pain meds ordered.  "

## 2024-03-02 NOTE — ASSESSMENT & PLAN NOTE
Chronic, controlled. Latest blood pressure and vitals reviewed-     Temp:  [97.6 °F (36.4 °C)-98.1 °F (36.7 °C)]   Pulse:  [82-94]   Resp:  [14-20]   BP: (116-137)/(76-84)   SpO2:  [95 %-97 %] .

## 2024-03-02 NOTE — HOSPITAL COURSE
3/2 AA, weekend crosscover, patient history of gastric adenocarcinoma, history of partial gastrectomy, came in with abdominal pain nausea vomiting, dehydration, following with Oncology for gastric mass, planning for follow-up with oncologist to discuss results, blood pressure stable, complains of abdominal spasm and pain, pain meds adjusted  3/3 AA, weekend crosscover, no acute events overnight, patient did have small bowel movement with enema yesterday, patient wanting to repeat enema today, blood pressure stable, noted elevated , check B12 level, renal function continuing to improve, continue pain control, IV fluids, discharge planning  3/4 GT:  Patient assessed with wife and son at bedside this morning.  Patient is complaining of abdominal discomfort.  Patient is having difficulty with bowel movements.  Changes made to bowel regimen, adding long-acting pain medication to his regimen.  Patient reports he has made peace and is ready to die.   Hospice discussed with patient, spouse, and son at bedside.  All are in agreement and would like an informational meeting.  Case management consult entered.  Patient is supposed to have follow up with Oncology tomorrow for the results of his PET scan, and we have informed family that even if patient is unable to make this appointment, a telephone conference should be held to receive definitive results.  3/5 GT:  Patient is lethargic this morning but arousable.  Patient had some hematemesis yesterday, Lovenox was discontinued, Protonix was increased to twice a day, Carafate was added to his medication regimen.  Patient's pain seems to be controlled with long-acting MS Contin that was added yesterday.  Patient did require supplemental oxygen overnight, and is still on nasal cannula this morning.  This morning nursing informed us that patient's son is refusing oral medications because he was fearful that the patient will choke.  Patient's long-acting medication is oral,  not transitioning to higher doses of IV pain medication, and nursing was instructed to administer oral MS Contin.  We will keep patient 1 more day, informed son that eventually he needs to be discharged home with hospice.  3/6 GT:  Patient is currently in the process of transitioning.  Patient is tachycardic and is noted with elevated leukocytosis.  Patient had urinary retention yesterday requiring insertion of an indwelling Gray catheter.  Patient has minimal urine output noted in catheter bag this morning.  Patient is lethargic, mouth breathing, mildly tachypneic, minimally responsive.  [facility] is no longer willing to accept patient due to patient's sons post death plan.  Patient's spouse who is at bedside reports she would like patient's stay hospitalized for comfort care being that we expect him to  in the next 24-48 hours.  Patient's son is requesting patient be discharged so that he can begin the drive to [place] and have him pronounced there.  Patient reports he spoke to [facility] hospice and he reports a informed him that they will go to a hotel room to see the patient, but son is unsure of the hotel which he is planning to take patient to.  Prognostically, we have informed both the son and the spouse that patient may not make the ride.  Case management updated.    Pt admitted to in hospice this afternoon - will continue comfort care.

## 2024-03-02 NOTE — PLAN OF CARE
POC reviewed with patient. JERSONNazario XENA x4. Son at bedside. Pt complains of abdominal pain. PRN pain meds giving. No other complains noted. Bed lowered call bell in reach.  Problem: Adult Inpatient Plan of Care  Goal: Plan of Care Review  Outcome: Ongoing, Progressing  Goal: Patient-Specific Goal (Individualized)  Outcome: Ongoing, Progressing  Goal: Absence of Hospital-Acquired Illness or Injury  Outcome: Ongoing, Progressing  Goal: Optimal Comfort and Wellbeing  Outcome: Ongoing, Progressing  Goal: Readiness for Transition of Care  Outcome: Ongoing, Progressing     Problem: Fall Injury Risk  Goal: Absence of Fall and Fall-Related Injury  Outcome: Ongoing, Progressing

## 2024-03-02 NOTE — PROGRESS NOTES
"[facility] Medicine  Progress Note    Patient Name: Rayo Duran  MRN: [id number]  Patient Class: OP- Observation   Admission Date: 3/1/2024  Length of Stay: 0 days  Attending Physician: Caroline Florez MD  Primary Care Provider: Caroline Florez MD        Subjective:     Principal Problem:Dehydration        HPI:  ED HPI: 88-year-old male with a history of stomach cancer presents the ER with abdominal pain has been going on for "a very long time" now associated with nausea and vomiting that has been going on for awhile as well.  Denies any fever.  No blood in stool or vomit.  Not ill appearing, talking in full sentences without issue.  No chest pain or shortness of breath.  Describes the pain as diffuse.  Was seen here a few weeks ago for same issue, full workup done including CT scan.  See results:     Impression:     1. Previous gastric surgery  2. New mild to moderate ascites around the liver and spleen and in the pericolic gutters and pelvis. There is some heterogeneity of the omentum anteriorly which could just be due to the fluid but cannot exclude malignant ascites or omental metastasis.  3. Diverticulosis but no diverticulitis  4. Cholecystectomy  5. 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  Patient has been evaluated in the clinic twice within the last month for urinary issues, weight loss, dehydration.  Patient was given 1 L of IV fluids during most recent clinic visit for dehydration, and had labs performed with Oncology later that afternoon.  Patient had a follow up with oncologist on 02/19/2024 and had a PET scan last week.  Patient's daughter who is present at " bedside reports they have an upcoming appointment next Tuesday for the PET scan results.  Per oncology note, he does not feel patient would be an appropriate candidate for systemic palliative chemotherapy due to advanced age.  Labs obtained in the emergency department reviewed.  Patient noted with a mild dehydration, and reports improvement in abdominal pain and weakness after administration of IV fluids.  Patient reports poor appetite, minimal oral intake, severe weakness with recurrent falls.  Patient admitted for rehydration with IVF, antiemetics, monitoring.    Overview/Hospital Course:  3/2 AA, weekend crosscover, patient history of gastric adenocarcinoma, history of partial gastrectomy, came in with abdominal pain nausea vomiting, dehydration, following with Oncology for gastric mass, planning for follow-up with oncologist to discuss results, blood pressure stable, complains of abdominal spasm and pain, pain meds adjusted    Interval History: Patient seen and examined.    Review of Systems   Constitutional:  Positive for fatigue. Negative for activity change, appetite change, chills and fever.   HENT:  Negative for ear pain, mouth sores, nosebleeds and sore throat.    Eyes:  Negative for visual disturbance.   Respiratory:  Negative for cough, shortness of breath and wheezing.    Cardiovascular:  Negative for chest pain, palpitations and leg swelling.   Gastrointestinal:  Positive for abdominal pain, nausea and vomiting. Negative for abdominal distention, blood in stool, constipation and diarrhea.   Endocrine: Negative for polyphagia.   Genitourinary:  Negative for difficulty urinating, dysuria, flank pain and frequency.   Musculoskeletal:  Positive for joint swelling and myalgias. Negative for arthralgias and back pain.   Skin:  Positive for wound. Negative for rash.   Neurological:  Positive for weakness. Negative for dizziness, tremors, seizures, syncope, facial asymmetry, speech difficulty and headaches.    Hematological:  Negative for adenopathy.   Psychiatric/Behavioral:  Positive for confusion. Negative for agitation and hallucinations. The patient is nervous/anxious.      Objective:     Vital Signs (Most Recent):  Temp: 97.9 °F (36.6 °C) (03/02/24 1118)  Pulse: 82 (03/02/24 1118)  Resp: 16 (03/02/24 1118)  BP: 125/79 (03/02/24 1118)  SpO2: 97 % (03/02/24 1118) Vital Signs (24h Range):  Temp:  [97.6 °F (36.4 °C)-98.1 °F (36.7 °C)] 97.9 °F (36.6 °C)  Pulse:  [82-94] 82  Resp:  [14-20] 16  SpO2:  [95 %-97 %] 97 %  BP: (116-137)/(76-84) 125/79     Weight: 58.3 kg (128 lb 8 oz)  Body mass index is 18.44 kg/m².    Intake/Output Summary (Last 24 hours) at 3/2/2024 1143  Last data filed at 3/2/2024 0705  Gross per 24 hour   Intake 2201.74 ml   Output --   Net 2201.74 ml         Physical Exam  Constitutional:       General: He is awake. He is not in acute distress.     Appearance: He is cachectic. He is ill-appearing (Frail, chronically ill-appearing). He is not toxic-appearing.      Comments: Tobacco stains under fingernails   HENT:      Head: Normocephalic and atraumatic.      Right Ear: Decreased hearing noted.      Left Ear: Decreased hearing noted.      Nose: No congestion or rhinorrhea.      Mouth/Throat:      Pharynx: No oropharyngeal exudate.      Comments: Tobacco stains to dentures  Eyes:      General: No scleral icterus.  Neck:      Vascular: No carotid bruit.   Cardiovascular:      Rate and Rhythm: Normal rate and regular rhythm.      Pulses: Normal pulses.      Heart sounds: Normal heart sounds. No murmur heard.     No friction rub. No gallop.   Pulmonary:      Effort: Pulmonary effort is normal. No respiratory distress.      Breath sounds: Normal breath sounds. No stridor. No wheezing, rhonchi or rales.   Chest:      Chest wall: No tenderness.   Abdominal:      General: There is no distension.      Palpations: There is no mass.      Tenderness: There is abdominal tenderness (diffuse tenderness to palpation).  There is no right CVA tenderness, left CVA tenderness, guarding or rebound.      Hernia: No hernia is present.   Musculoskeletal:         General: No swelling, tenderness or deformity.      Cervical back: No rigidity.      Right lower leg: No edema.      Left lower leg: No edema.   Lymphadenopathy:      Cervical: No cervical adenopathy.   Skin:     General: Skin is warm and dry.      Capillary Refill: Capillary refill takes less than 2 seconds.      Coloration: Skin is pale. Skin is not jaundiced.      Findings: Bruising present. No rash.   Neurological:      Mental Status: He is alert. Mental status is at baseline.      Motor: Weakness present.      Gait: Gait abnormal.             Significant Labs: All pertinent labs within the past 24 hours have been reviewed.  Recent Lab Results         03/02/24  0532        Albumin 1.9       ALP 87       ALT 10       Anion Gap 5       AST 8       Baso # 0.03       Basophil % 0.5       BILIRUBIN TOTAL 0.7  Comment: For infants and newborns, interpretation of results should be based  on gestational age, weight and in agreement with clinical  observations.    Premature Infant recommended reference ranges:  Up to 24 hours.............<8.0 mg/dL  Up to 48 hours............<12.0 mg/dL  3-5 days..................<15.0 mg/dL  6-29 days.................<15.0 mg/dL    For patients on Eltrombopag therapy, use of Dimension [place] TBIL is   not   recommended.         BUN 26       Calcium 8.2       Chloride 113       CO2 24       Creatinine 1.2       Differential Method Automated       eGFR 58.9       Eos # 0.1       Eos % 1.2       Glucose 77       Gran # (ANC) 5.3       Gran % 80.5       Hematocrit 38.6       Hemoglobin 12.6       Immature Grans (Abs) 0.02  Comment: Mild elevation in immature granulocytes is non specific and   can be seen in a variety of conditions including stress response,   acute inflammation, trauma and pregnancy. Correlation with other   laboratory and clinical  findings is essential.         Immature Granulocytes 0.3       Lymph # 0.6       Lymph % 9.2       Magnesium  2.3       MCH 34.2       MCHC 32.6              Mono # 0.5       Mono % 8.3       MPV 9.2       nRBC 0       Platelet Count 332       Potassium 3.8       PROTEIN TOTAL 5.6       RBC 3.68       RDW 12.8       Sodium 142       WBC 6.53               Significant Imaging: I have reviewed all pertinent imaging results/findings within the past 24 hours.    Assessment/Plan:      * Dehydration  Patient noted with mild dehydration on labs.  Patient admitted for IV fluids, antiemetics.      Frequent falls  Consulting therapy services.      Hard of hearing  Noted.  Safety precautions.      Malignant neoplasm of body of stomach  S/p partial gastrectomy 07/12/2022.  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  Patient had follow up with oncologist on 02/19/2024 and had a PET-CT performed last week.  Patient has upcoming appointment on Tuesday to receive the results of the PET-CT.  Due to advanced age, patient will be a poor candidate for palliative systemic chemotherapy.      HTN (hypertension), benign  Chronic, controlled. Latest blood pressure and vitals reviewed-     Temp:  [97.6 °F (36.4 °C)-98.1 °F (36.7 °C)]   Pulse:  [82-94]   Resp:  [14-20]   BP: (116-137)/(76-84)   SpO2:  [95 %-97 %] .       VTE Risk Mitigation (From admission, onward)           Ordered     enoxaparin injection 40 mg  Every 24 hours         03/01/24 1347     IP VTE HIGH RISK PATIENT  Once         03/01/24 1315     Place sequential compression device  Until discontinued         03/01/24 1315                    Discharge Planning   PHILLIP:      Code Status: Full Code   Is the patient medically ready for discharge?:     Reason for patient still in hospital (select all that apply): Patient trending condition, Laboratory test, and Treatment  Discharge Plan A: Home with family                  Benigno  MD Alejandra  [facility]   [facility]

## 2024-03-02 NOTE — SUBJECTIVE & OBJECTIVE
Interval History: Patient seen and examined.    Review of Systems   Constitutional:  Positive for fatigue. Negative for activity change, appetite change, chills and fever.   HENT:  Negative for ear pain, mouth sores, nosebleeds and sore throat.    Eyes:  Negative for visual disturbance.   Respiratory:  Negative for cough, shortness of breath and wheezing.    Cardiovascular:  Negative for chest pain, palpitations and leg swelling.   Gastrointestinal:  Positive for abdominal pain, nausea and vomiting. Negative for abdominal distention, blood in stool, constipation and diarrhea.   Endocrine: Negative for polyphagia.   Genitourinary:  Negative for difficulty urinating, dysuria, flank pain and frequency.   Musculoskeletal:  Positive for joint swelling and myalgias. Negative for arthralgias and back pain.   Skin:  Positive for wound. Negative for rash.   Neurological:  Positive for weakness. Negative for dizziness, tremors, seizures, syncope, facial asymmetry, speech difficulty and headaches.   Hematological:  Negative for adenopathy.   Psychiatric/Behavioral:  Positive for confusion. Negative for agitation and hallucinations. The patient is nervous/anxious.      Objective:     Vital Signs (Most Recent):  Temp: 97.9 °F (36.6 °C) (03/02/24 1118)  Pulse: 82 (03/02/24 1118)  Resp: 16 (03/02/24 1118)  BP: 125/79 (03/02/24 1118)  SpO2: 97 % (03/02/24 1118) Vital Signs (24h Range):  Temp:  [97.6 °F (36.4 °C)-98.1 °F (36.7 °C)] 97.9 °F (36.6 °C)  Pulse:  [82-94] 82  Resp:  [14-20] 16  SpO2:  [95 %-97 %] 97 %  BP: (116-137)/(76-84) 125/79     Weight: 58.3 kg (128 lb 8 oz)  Body mass index is 18.44 kg/m².    Intake/Output Summary (Last 24 hours) at 3/2/2024 1143  Last data filed at 3/2/2024 0705  Gross per 24 hour   Intake 2201.74 ml   Output --   Net 2201.74 ml         Physical Exam  Constitutional:       General: He is awake. He is not in acute distress.     Appearance: He is cachectic. He is ill-appearing (Frail, chronically  ill-appearing). He is not toxic-appearing.      Comments: Tobacco stains under fingernails   HENT:      Head: Normocephalic and atraumatic.      Right Ear: Decreased hearing noted.      Left Ear: Decreased hearing noted.      Nose: No congestion or rhinorrhea.      Mouth/Throat:      Pharynx: No oropharyngeal exudate.      Comments: Tobacco stains to dentures  Eyes:      General: No scleral icterus.  Neck:      Vascular: No carotid bruit.   Cardiovascular:      Rate and Rhythm: Normal rate and regular rhythm.      Pulses: Normal pulses.      Heart sounds: Normal heart sounds. No murmur heard.     No friction rub. No gallop.   Pulmonary:      Effort: Pulmonary effort is normal. No respiratory distress.      Breath sounds: Normal breath sounds. No stridor. No wheezing, rhonchi or rales.   Chest:      Chest wall: No tenderness.   Abdominal:      General: There is no distension.      Palpations: There is no mass.      Tenderness: There is abdominal tenderness (diffuse tenderness to palpation). There is no right CVA tenderness, left CVA tenderness, guarding or rebound.      Hernia: No hernia is present.   Musculoskeletal:         General: No swelling, tenderness or deformity.      Cervical back: No rigidity.      Right lower leg: No edema.      Left lower leg: No edema.   Lymphadenopathy:      Cervical: No cervical adenopathy.   Skin:     General: Skin is warm and dry.      Capillary Refill: Capillary refill takes less than 2 seconds.      Coloration: Skin is pale. Skin is not jaundiced.      Findings: Bruising present. No rash.   Neurological:      Mental Status: He is alert. Mental status is at baseline.      Motor: Weakness present.      Gait: Gait abnormal.             Significant Labs: All pertinent labs within the past 24 hours have been reviewed.  Recent Lab Results         03/02/24  0532        Albumin 1.9       ALP 87       ALT 10       Anion Gap 5       AST 8       Baso # 0.03       Basophil % 0.5        BILIRUBIN TOTAL 0.7  Comment: For infants and newborns, interpretation of results should be based  on gestational age, weight and in agreement with clinical  observations.    Premature Infant recommended reference ranges:  Up to 24 hours.............<8.0 mg/dL  Up to 48 hours............<12.0 mg/dL  3-5 days..................<15.0 mg/dL  6-29 days.................<15.0 mg/dL    For patients on Eltrombopag therapy, use of Dimension [place] TBIL is   not   recommended.         BUN 26       Calcium 8.2       Chloride 113       CO2 24       Creatinine 1.2       Differential Method Automated       eGFR 58.9       Eos # 0.1       Eos % 1.2       Glucose 77       Gran # (ANC) 5.3       Gran % 80.5       Hematocrit 38.6       Hemoglobin 12.6       Immature Grans (Abs) 0.02  Comment: Mild elevation in immature granulocytes is non specific and   can be seen in a variety of conditions including stress response,   acute inflammation, trauma and pregnancy. Correlation with other   laboratory and clinical findings is essential.         Immature Granulocytes 0.3       Lymph # 0.6       Lymph % 9.2       Magnesium  2.3       MCH 34.2       MCHC 32.6              Mono # 0.5       Mono % 8.3       MPV 9.2       nRBC 0       Platelet Count 332       Potassium 3.8       PROTEIN TOTAL 5.6       RBC 3.68       RDW 12.8       Sodium 142       WBC 6.53               Significant Imaging: I have reviewed all pertinent imaging results/findings within the past 24 hours.

## 2024-03-03 NOTE — PLAN OF CARE
03/03/24 0847   Medicare Message   Important Message from Medicare regarding Discharge Appeal Rights Given to patient/caregiver;Explained to patient/caregiver;Signed/date by patient/caregiver   Date IMM was signed 03/03/24   Time IMM was signed 0832

## 2024-03-03 NOTE — ASSESSMENT & PLAN NOTE
Chronic, controlled. Latest blood pressure and vitals reviewed-     Temp:  [97.6 °F (36.4 °C)-98 °F (36.7 °C)]   Pulse:  [82-97]   Resp:  [16-20]   BP: (110-133)/(75-88)   SpO2:  [95 %-100 %] .

## 2024-03-03 NOTE — PLAN OF CARE
03/03/24 0842   Medicare Message   Important Message from Medicare regarding Discharge Appeal Rights Given to patient/caregiver;Explained to patient/caregiver;Signed/date by patient/caregiver   Date IMM was signed 03/03/24   Time IMM was signed 0832

## 2024-03-03 NOTE — PLAN OF CARE
Patient reports sharp, intermittent abdominal pain, mostly in the RLQ and occasionally in the LLQ. Abdomen is firm and distended. PRN pain med administered with moderate relief. Patient vomited green colored contents. Ambulated to restroom with assistance from son.     Problem: Adult Inpatient Plan of Care  Go  al: Plan of Care Review  Outcome: Ongoing, Progressing  Goal: Patient-Specific Goal (Individualized)  Outcome: Ongoing, Progressing  Goal: Absence of Hospital-Acquired Illness or Injury  Outcome: Ongoing, Progressing  Goal: Optimal Comfort and Wellbeing  Outcome: Ongoing, Progressing  Goal: Readiness for Transition of Care  Outcome: Ongoing, Progressing     Problem: Fall Injury Risk  Goal: Absence of Fall and Fall-Related Injury  Outcome: Ongoing, Progressing     Problem: Skin Injury Risk Increased  Goal: Skin Health and Integrity  Outcome: Ongoing, Progressing

## 2024-03-03 NOTE — SUBJECTIVE & OBJECTIVE
Interval History: Patient seen and examined.    Review of Systems   Constitutional:  Positive for fatigue. Negative for activity change, appetite change, chills and fever.   HENT:  Negative for ear pain, mouth sores, nosebleeds and sore throat.    Eyes:  Negative for visual disturbance.   Respiratory:  Negative for cough, shortness of breath and wheezing.    Cardiovascular:  Negative for chest pain, palpitations and leg swelling.   Gastrointestinal:  Positive for abdominal pain, nausea and vomiting. Negative for abdominal distention, blood in stool, constipation and diarrhea.   Endocrine: Negative for polyphagia.   Genitourinary:  Negative for difficulty urinating, dysuria, flank pain and frequency.   Musculoskeletal:  Positive for joint swelling and myalgias. Negative for arthralgias and back pain.   Skin:  Positive for wound. Negative for rash.   Neurological:  Positive for weakness. Negative for dizziness, tremors, seizures, syncope, facial asymmetry, speech difficulty and headaches.   Hematological:  Negative for adenopathy.   Psychiatric/Behavioral:  Positive for confusion. Negative for agitation and hallucinations. The patient is nervous/anxious.      Objective:     Vital Signs (Most Recent):  Temp: 97.6 °F (36.4 °C) (03/03/24 0525)  Pulse: 94 (03/03/24 0525)  Resp: 20 (03/03/24 1043)  BP: 128/75 (03/03/24 0525)  SpO2: 96 % (03/03/24 0525) Vital Signs (24h Range):  Temp:  [97.6 °F (36.4 °C)-98 °F (36.7 °C)] 97.6 °F (36.4 °C)  Pulse:  [82-97] 94  Resp:  [16-20] 20  SpO2:  [95 %-100 %] 96 %  BP: (110-133)/(75-88) 128/75     Weight: 58.3 kg (128 lb 8 oz)  Body mass index is 18.44 kg/m².    Intake/Output Summary (Last 24 hours) at 3/3/2024 1105  Last data filed at 3/3/2024 0637  Gross per 24 hour   Intake 3920.32 ml   Output --   Net 3920.32 ml           Physical Exam  Constitutional:       General: He is awake. He is not in acute distress.     Appearance: He is cachectic. He is ill-appearing (Frail, chronically  ill-appearing). He is not toxic-appearing.      Comments: Tobacco stains under fingernails   HENT:      Head: Normocephalic and atraumatic.      Right Ear: Decreased hearing noted.      Left Ear: Decreased hearing noted.      Nose: No congestion or rhinorrhea.      Mouth/Throat:      Pharynx: No oropharyngeal exudate.      Comments: Tobacco stains to dentures  Eyes:      General: No scleral icterus.  Neck:      Vascular: No carotid bruit.   Cardiovascular:      Rate and Rhythm: Normal rate and regular rhythm.      Pulses: Normal pulses.      Heart sounds: Normal heart sounds. No murmur heard.     No friction rub. No gallop.   Pulmonary:      Effort: Pulmonary effort is normal. No respiratory distress.      Breath sounds: Normal breath sounds. No stridor. No wheezing, rhonchi or rales.   Chest:      Chest wall: No tenderness.   Abdominal:      General: There is no distension.      Palpations: There is no mass.      Tenderness: There is abdominal tenderness (diffuse tenderness to palpation). There is no right CVA tenderness, left CVA tenderness, guarding or rebound.      Hernia: No hernia is present.   Musculoskeletal:         General: No swelling, tenderness or deformity.      Cervical back: No rigidity.      Right lower leg: No edema.      Left lower leg: No edema.   Lymphadenopathy:      Cervical: No cervical adenopathy.   Skin:     General: Skin is warm and dry.      Capillary Refill: Capillary refill takes less than 2 seconds.      Coloration: Skin is pale. Skin is not jaundiced.      Findings: Bruising present. No rash.   Neurological:      Mental Status: He is alert. Mental status is at baseline.      Motor: Weakness present.      Gait: Gait abnormal.             Significant Labs: All pertinent labs within the past 24 hours have been reviewed.  Recent Lab Results         03/03/24  0529        Albumin 2.0       ALP 95       ALT 10       Anion Gap 9       AST 16       Baso # 0.05       Basophil % 0.5        BILIRUBIN TOTAL 0.8  Comment: For infants and newborns, interpretation of results should be based  on gestational age, weight and in agreement with clinical  observations.    Premature Infant recommended reference ranges:  Up to 24 hours.............<8.0 mg/dL  Up to 48 hours............<12.0 mg/dL  3-5 days..................<15.0 mg/dL  6-29 days.................<15.0 mg/dL    For patients on Eltrombopag therapy, use of Dimension [place] TBIL is   not   recommended.         BUN 23       Calcium 8.5       Chloride 116       CO2 20       Creatinine 1.0       Differential Method Automated       eGFR >60.0       Eos # 0.1       Eos % 1.0       Glucose 78       Gran # (ANC) 8.2       Gran % 87.2       Hematocrit 41.2       Hemoglobin 13.3       Immature Grans (Abs) 0.04  Comment: Mild elevation in immature granulocytes is non specific and   can be seen in a variety of conditions including stress response,   acute inflammation, trauma and pregnancy. Correlation with other   laboratory and clinical findings is essential.         Immature Granulocytes 0.4       Lymph # 0.6       Lymph % 6.7       Magnesium  2.2       MCH 34.7       MCHC 32.3              Mono # 0.4       Mono % 4.2       MPV 9.7       nRBC 0       Platelet Count 362       Potassium 3.5       PROTEIN TOTAL 6.0       RBC 3.83       RDW 13.1       Sodium 145       WBC 9.44               Significant Imaging: I have reviewed all pertinent imaging results/findings within the past 24 hours.

## 2024-03-03 NOTE — PROGRESS NOTES
"[facility] Medicine  Progress Note    Patient Name: Rayo Duran  MRN: [id number]  Patient Class: IP- Inpatient   Admission Date: 3/1/2024  Length of Stay: 1 days  Attending Physician: Caroline Florez MD  Primary Care Provider: Caroline Florez MD        Subjective:     Principal Problem:Dehydration        HPI:  ED HPI: 88-year-old male with a history of stomach cancer presents the ER with abdominal pain has been going on for "a very long time" now associated with nausea and vomiting that has been going on for awhile as well.  Denies any fever.  No blood in stool or vomit.  Not ill appearing, talking in full sentences without issue.  No chest pain or shortness of breath.  Describes the pain as diffuse.  Was seen here a few weeks ago for same issue, full workup done including CT scan.  See results:     Impression:     1. Previous gastric surgery  2. New mild to moderate ascites around the liver and spleen and in the pericolic gutters and pelvis. There is some heterogeneity of the omentum anteriorly which could just be due to the fluid but cannot exclude malignant ascites or omental metastasis.  3. Diverticulosis but no diverticulitis  4. Cholecystectomy  5. 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  Patient has been evaluated in the clinic twice within the last month for urinary issues, weight loss, dehydration.  Patient was given 1 L of IV fluids during most recent clinic visit for dehydration, and had labs performed with Oncology later that afternoon.  Patient had a follow up with oncologist on 02/19/2024 and had a PET scan last week.  Patient's daughter who is present at bedside " reports they have an upcoming appointment next Tuesday for the PET scan results.  Per oncology note, he does not feel patient would be an appropriate candidate for systemic palliative chemotherapy due to advanced age.  Labs obtained in the emergency department reviewed.  Patient noted with a mild dehydration, and reports improvement in abdominal pain and weakness after administration of IV fluids.  Patient reports poor appetite, minimal oral intake, severe weakness with recurrent falls.  Patient admitted for rehydration with IVF, antiemetics, monitoring.    Overview/Hospital Course:  3/2 AA, weekend crosscover, patient history of gastric adenocarcinoma, history of partial gastrectomy, came in with abdominal pain nausea vomiting, dehydration, following with Oncology for gastric mass, planning for follow-up with oncologist to discuss results, blood pressure stable, complains of abdominal spasm and pain, pain meds adjusted  3/3 AA, weekend crosscover, no acute events overnight, patient did have small bowel movement with enema yesterday, patient wanting to repeat enema today, blood pressure stable, noted elevated , check B12 level, renal function continuing to improve, continue pain control, IV fluids, discharge planning    Interval History: Patient seen and examined.    Review of Systems   Constitutional:  Positive for fatigue. Negative for activity change, appetite change, chills and fever.   HENT:  Negative for ear pain, mouth sores, nosebleeds and sore throat.    Eyes:  Negative for visual disturbance.   Respiratory:  Negative for cough, shortness of breath and wheezing.    Cardiovascular:  Negative for chest pain, palpitations and leg swelling.   Gastrointestinal:  Positive for abdominal pain, nausea and vomiting. Negative for abdominal distention, blood in stool, constipation and diarrhea.   Endocrine: Negative for polyphagia.   Genitourinary:  Negative for difficulty urinating, dysuria, flank pain and  frequency.   Musculoskeletal:  Positive for joint swelling and myalgias. Negative for arthralgias and back pain.   Skin:  Positive for wound. Negative for rash.   Neurological:  Positive for weakness. Negative for dizziness, tremors, seizures, syncope, facial asymmetry, speech difficulty and headaches.   Hematological:  Negative for adenopathy.   Psychiatric/Behavioral:  Positive for confusion. Negative for agitation and hallucinations. The patient is nervous/anxious.      Objective:     Vital Signs (Most Recent):  Temp: 97.6 °F (36.4 °C) (03/03/24 0525)  Pulse: 94 (03/03/24 0525)  Resp: 20 (03/03/24 1043)  BP: 128/75 (03/03/24 0525)  SpO2: 96 % (03/03/24 0525) Vital Signs (24h Range):  Temp:  [97.6 °F (36.4 °C)-98 °F (36.7 °C)] 97.6 °F (36.4 °C)  Pulse:  [82-97] 94  Resp:  [16-20] 20  SpO2:  [95 %-100 %] 96 %  BP: (110-133)/(75-88) 128/75     Weight: 58.3 kg (128 lb 8 oz)  Body mass index is 18.44 kg/m².    Intake/Output Summary (Last 24 hours) at 3/3/2024 1105  Last data filed at 3/3/2024 0637  Gross per 24 hour   Intake 3920.32 ml   Output --   Net 3920.32 ml           Physical Exam  Constitutional:       General: He is awake. He is not in acute distress.     Appearance: He is cachectic. He is ill-appearing (Frail, chronically ill-appearing). He is not toxic-appearing.      Comments: Tobacco stains under fingernails   HENT:      Head: Normocephalic and atraumatic.      Right Ear: Decreased hearing noted.      Left Ear: Decreased hearing noted.      Nose: No congestion or rhinorrhea.      Mouth/Throat:      Pharynx: No oropharyngeal exudate.      Comments: Tobacco stains to dentures  Eyes:      General: No scleral icterus.  Neck:      Vascular: No carotid bruit.   Cardiovascular:      Rate and Rhythm: Normal rate and regular rhythm.      Pulses: Normal pulses.      Heart sounds: Normal heart sounds. No murmur heard.     No friction rub. No gallop.   Pulmonary:      Effort: Pulmonary effort is normal. No  respiratory distress.      Breath sounds: Normal breath sounds. No stridor. No wheezing, rhonchi or rales.   Chest:      Chest wall: No tenderness.   Abdominal:      General: There is no distension.      Palpations: There is no mass.      Tenderness: There is abdominal tenderness (diffuse tenderness to palpation). There is no right CVA tenderness, left CVA tenderness, guarding or rebound.      Hernia: No hernia is present.   Musculoskeletal:         General: No swelling, tenderness or deformity.      Cervical back: No rigidity.      Right lower leg: No edema.      Left lower leg: No edema.   Lymphadenopathy:      Cervical: No cervical adenopathy.   Skin:     General: Skin is warm and dry.      Capillary Refill: Capillary refill takes less than 2 seconds.      Coloration: Skin is pale. Skin is not jaundiced.      Findings: Bruising present. No rash.   Neurological:      Mental Status: He is alert. Mental status is at baseline.      Motor: Weakness present.      Gait: Gait abnormal.             Significant Labs: All pertinent labs within the past 24 hours have been reviewed.  Recent Lab Results         03/03/24  0529        Albumin 2.0       ALP 95       ALT 10       Anion Gap 9       AST 16       Baso # 0.05       Basophil % 0.5       BILIRUBIN TOTAL 0.8  Comment: For infants and newborns, interpretation of results should be based  on gestational age, weight and in agreement with clinical  observations.    Premature Infant recommended reference ranges:  Up to 24 hours.............<8.0 mg/dL  Up to 48 hours............<12.0 mg/dL  3-5 days..................<15.0 mg/dL  6-29 days.................<15.0 mg/dL    For patients on Eltrombopag therapy, use of Dimension Tibbie TBIL is   not   recommended.         BUN 23       Calcium 8.5       Chloride 116       CO2 20       Creatinine 1.0       Differential Method Automated       eGFR >60.0       Eos # 0.1       Eos % 1.0       Glucose 78       Gran # (ANC) 8.2       Gran %  87.2       Hematocrit 41.2       Hemoglobin 13.3       Immature Grans (Abs) 0.04  Comment: Mild elevation in immature granulocytes is non specific and   can be seen in a variety of conditions including stress response,   acute inflammation, trauma and pregnancy. Correlation with other   laboratory and clinical findings is essential.         Immature Granulocytes 0.4       Lymph # 0.6       Lymph % 6.7       Magnesium  2.2       MCH 34.7       MCHC 32.3              Mono # 0.4       Mono % 4.2       MPV 9.7       nRBC 0       Platelet Count 362       Potassium 3.5       PROTEIN TOTAL 6.0       RBC 3.83       RDW 13.1       Sodium 145       WBC 9.44               Significant Imaging: I have reviewed all pertinent imaging results/findings within the past 24 hours.    Assessment/Plan:      * Dehydration  Patient noted with mild dehydration on labs.  Patient admitted for IV fluids, antiemetics.      Frequent falls  Consulting therapy services.      Hard of hearing  Noted.  Safety precautions.      Malignant neoplasm of body of stomach  S/p partial gastrectomy 07/12/2022.  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  Patient had follow up with oncologist on 02/19/2024 and had a PET-CT performed last week.  Patient has upcoming appointment on Tuesday to receive the results of the PET-CT.  Due to advanced age, patient will be a poor candidate for palliative systemic chemotherapy.      HTN (hypertension), benign  Chronic, controlled. Latest blood pressure and vitals reviewed-     Temp:  [97.6 °F (36.4 °C)-98 °F (36.7 °C)]   Pulse:  [82-97]   Resp:  [16-20]   BP: (110-133)/(75-88)   SpO2:  [95 %-100 %] .       VTE Risk Mitigation (From admission, onward)           Ordered     enoxaparin injection 40 mg  Every 24 hours         03/01/24 1347     IP VTE HIGH RISK PATIENT  Once         03/01/24 1315     Place sequential compression device  Until discontinued         03/01/24 1315                     Discharge Planning   PHILLIP:      Code Status: Full Code   Is the patient medically ready for discharge?:     Reason for patient still in hospital (select all that apply): Patient trending condition, Laboratory test, and Treatment  Discharge Plan A: Home with family                  Benigno Roberts MD  Department of Hospital Medicine   [facility]

## 2024-03-04 NOTE — PLAN OF CARE
Intermittent severe pain and N/V throughout shift. Treated with Morphine, Bentyl, and Zofran. Abdomen remains firm and distended with hypoactive bowel sounds. No BM during shift. Patient only urinated once during shift, Bladder scan shows 271. Patient states he has not urinated much for 2 years now.     Problem: Adult Inpatient Plan of Care  Goal: Plan of Care Review  Outcome: Ongoing, Progressing  Goal: Patient-Specific Goal (Individualized)  Outcome: Ongoing, Progressing  Goal: Absence of Hospital-Acquired Illness or Injury  Outcome: Ongoing, Progressing  Goal: Optimal Comfort and Wellbeing  Outcome: Ongoing, Progressing  Goal: Readiness for Transition of Care  Outcome: Ongoing, Progressing     Problem: Fall Injury Risk  Goal: Absence of Fall and Fall-Related Injury  Outcome: Ongoing, Progressing     Problem: Skin Injury Risk Increased  Goal: Skin Health and Integrity  Outcome: Ongoing, Progressing

## 2024-03-04 NOTE — ASSESSMENT & PLAN NOTE
Chronic, controlled. Latest blood pressure and vitals reviewed-     Temp:  [97.4 °F (36.3 °C)-98 °F (36.7 °C)]   Pulse:  []   Resp:  [16-23]   BP: (108-130)/(73-79)   SpO2:  [93 %-97 %] .

## 2024-03-04 NOTE — PT/OT/SLP PROGRESS
"Physical Therapy Treatment    Patient Name:  Rayo Duran   MRN:  [id number]    Recommendations:     Discharge Recommendations:  (hospice care)  Discharge Equipment Recommendations: other (see comments) (hospice care will provide)  Barriers to discharge: None    Assessment:     Rayo Duran is a 88 y.o. male admitted with a medical diagnosis of Dehydration.  He presents with the following impairments/functional limitations: weakness, impaired joint extensibility, impaired endurance, impaired muscle length, impaired sensation, impaired self care skills, decreased upper extremity function, impaired functional mobility, decreased lower extremity function, gait instability, decreased safety awareness, impaired balance, impaired cardiopulmonary response to activity Patient was found supine in bed with family and nurse present.  Patient just finished with occupational therapy.  Patient needs encouragement to participate, he reports that he does not want to walk and he just wants "to be in bed and die."  Nurse Geoffrey Guaman LPN aware, family at bedside.  Hospice care was consulted.  Patient will be discharged form P.T. services at this time per patient's request. Patient tolerated sitting and standing by the bed and refused to do anything afterwards, positioned for comfort.     Rehab Prognosis: Poor; patient would benefit from acute skilled PT services to address these deficits and reach maximum level of function.    Recent Surgery: * No surgery found *      Plan:     During this hospitalization, patient to be seen  (discharge from P.T. services at this time) to address the identified rehab impairments via gait training, therapeutic activities, therapeutic exercises, neuromuscular re-education and progress toward the following goals:    Plan of Care Expires:  03/08/24    Subjective     Chief Complaint: "I just want to be in bed and die."   Patient/Family Comments/goals: Just be in bed and die."   Pain/Comfort:  Pain " Rating 1: 0/10  Pain Rating Post-Intervention 1: 0/10      Objective:     Communicated with nurse, son, wife and patient  prior to session.  Patient found HOB elevated with peripheral IV upon PT entry to room.     General Precautions: Standard, fall, hearing impaired  Orthopedic Precautions: N/A  Braces: N/A  Respiratory Status: Room air     Functional Mobility:  Bed Mobility:     Rolling Left:  supervision  Rolling Right: supervision  Scooting: supervision  Bridging: supervision  Supine to Sit: supervision  Sit to Supine: supervision  Transfers:     Sit to Stand:  stand by assistance with rolling walker  Gait: refused   Balance: SBA with static sitting, SBA with static standing using a RW       AM-PAC 6 CLICK MOBILITY  Turning over in bed (including adjusting bedclothes, sheets and blankets)?: 3  Sitting down on and standing up from a chair with arms (e.g., wheelchair, bedside commode, etc.): 3  Moving from lying on back to sitting on the side of the bed?: 3  Moving to and from a bed to a chair (including a wheelchair)?: 3  Need to walk in hospital room?: 3 (patient refused to ambulate at this time)  Climbing 3-5 steps with a railing?: 3 (based on clinical judgement)  Basic Mobility Total Score: 18       Treatment & Education:  Rolling side <> side  Supine <> sit  Scooting to the edge  of the bed   Sitting balance/tolerance  Sit <> stand with RW  Bed positioning      Patient left HOB elevated with all lines intact, call button in reach, bed alarm on, nurse  notified, and family  present..    GOALS:   Multidisciplinary Problems       Physical Therapy Goals          Problem: Physical Therapy    Goal Priority Disciplines Outcome Goal Variances Interventions   Physical Therapy Goal     PT, PT/OT Adequate for Care Transition     Description: Goals to be met by: 3/8/2024  Patient will increase functional independence with mobility by performin. Supine to sit with Modified Independent.  2. Sit to supine with  Modified Independent.  3. Bed to chair transfer with Modified Independent with rolling walker using Step Transfer technique.  4. Sit to Stand with Modified Independent with rolling walker.  5. Gait  x 50  feet with Supervision or Set-up Assistance with rolling walker.  6. Lower extremity exercise program x10 reps.                          Time Tracking:     PT Received On: 03/04/24  PT Start Time: 0936     PT Stop Time: 0956  PT Total Time (min): 20 min     Billable Minutes: Therapeutic Activity 20    Treatment Type: Treatment  PT/PTA: PT     Number of PTA visits since last PT visit: 0     03/04/2024

## 2024-03-04 NOTE — ASSESSMENT & PLAN NOTE
S/p partial gastrectomy 07/12/2022.  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  Patient had follow up with oncologist on 02/19/2024 and had a PET-CT performed last week.  Patient has upcoming appointment on Tuesday to receive the results of the PET-CT.  Due to advanced age, patient will be a poor candidate for palliative systemic chemotherapy.    3/4/GT:  Case management consult for informational hospice meeting entered.

## 2024-03-04 NOTE — PLAN OF CARE
Spoke to the patient and his family about the consult for hospice care. They are in agreement with the consult. The patient and his family did not have a preference, and they were okay with the recommendation from the physician for [facility].

## 2024-03-04 NOTE — PT/OT/SLP DISCHARGE
Occupational Therapy Discharge Summary    Rayo Duran  MRN: [id number]   Principal Problem: Dehydration      Patient Discharged from acute Occupational Therapy on 03/04/24.  Please refer to prior OT note dated 03/04/24 for functional status.    Assessment:     Pt refusing to continue OT services while requesting discharge from therapy services with Hospice consult pending.     Objective:     GOALS:   Multidisciplinary Problems       Occupational Therapy Goals          Problem: Occupational Therapy    Goal Priority Disciplines Outcome Interventions   Occupational Therapy Goal     OT, PT/OT Unable to Meet, Plan Revised    Description: Goals to be met by: 03/08/24     Patient will increase functional independence with ADLs by performing:    Feeding with Modified [place].  UE Dressing with Set-up Assistance.  LE Dressing with Set-up Assistance.  Grooming while seated at sink with Modified [place].  Toileting from toilet with Set-up Assistance for hygiene and clothing management.   Bathing from  shower chair/bench with Supervision.  Toilet transfer to toilet with Set-up Assistance.                         Reasons for Discontinuation of Therapy Services  Patient declines to continue care.      Plan:     Patient Discharged to: Palliative Care/Hospice    3/4/2024

## 2024-03-04 NOTE — PLAN OF CARE
"  Problem: Physical Therapy  Goal: Physical Therapy Goal  Description: Goals to be met by: 3/8/2024  Patient will increase functional independence with mobility by performin. Supine to sit with Modified Independent.  2. Sit to supine with Modified Independent.  3. Bed to chair transfer with Modified Independent with rolling walker using Step Transfer technique.  4. Sit to Stand with Modified Independent with rolling walker.  5. Gait  x 50  feet with Supervision or Set-up Assistance with rolling walker.  6. Lower extremity exercise program x10 reps.     Outcome: Not met, patient is requesting to be discharge from P.T. services, he states that he just wants to "get in bed and die", hospice care is being consulted.     "

## 2024-03-04 NOTE — PROGRESS NOTES
"[facility] Medicine  Progress Note    Patient Name: Rayo Duran  MRN: [id number]  Patient Class: IP- Inpatient   Admission Date: 3/1/2024  Length of Stay: 2 days  Attending Physician: Caroline Florez MD  Primary Care Provider: Caroline Florez MD        Subjective:     Principal Problem:Dehydration        HPI:  ED HPI: 88-year-old male with a history of stomach cancer presents the ER with abdominal pain has been going on for "a very long time" now associated with nausea and vomiting that has been going on for awhile as well.  Denies any fever.  No blood in stool or vomit.  Not ill appearing, talking in full sentences without issue.  No chest pain or shortness of breath.  Describes the pain as diffuse.  Was seen here a few weeks ago for same issue, full workup done including CT scan.  See results:     Impression:     1. Previous gastric surgery  2. New mild to moderate ascites around the liver and spleen and in the pericolic gutters and pelvis. There is some heterogeneity of the omentum anteriorly which could just be due to the fluid but cannot exclude malignant ascites or omental metastasis.  3. Diverticulosis but no diverticulitis  4. Cholecystectomy  5. 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  Patient has been evaluated in the clinic twice within the last month for urinary issues, weight loss, dehydration.  Patient was given 1 L of IV fluids during most recent clinic visit for dehydration, and had labs performed with Oncology later that afternoon.  Patient had a follow up with oncologist on 02/19/2024 and had a PET scan last week.  Patient's daughter who is present at bedside " reports they have an upcoming appointment next Tuesday for the PET scan results.  Per oncology note, he does not feel patient would be an appropriate candidate for systemic palliative chemotherapy due to advanced age.  Labs obtained in the emergency department reviewed.  Patient noted with a mild dehydration, and reports improvement in abdominal pain and weakness after administration of IV fluids.  Patient reports poor appetite, minimal oral intake, severe weakness with recurrent falls.  Patient admitted for rehydration with IVF, antiemetics, monitoring.    Overview/Hospital Course:  3/2 AA, weekend crosscover, patient history of gastric adenocarcinoma, history of partial gastrectomy, came in with abdominal pain nausea vomiting, dehydration, following with Oncology for gastric mass, planning for follow-up with oncologist to discuss results, blood pressure stable, complains of abdominal spasm and pain, pain meds adjusted  3/3 AA, weekend crosscover, no acute events overnight, patient did have small bowel movement with enema yesterday, patient wanting to repeat enema today, blood pressure stable, noted elevated , check B12 level, renal function continuing to improve, continue pain control, IV fluids, discharge planning  3/4 GT:  Patient assessed with wife and son at bedside this morning.  Patient is complaining of abdominal discomfort.  Patient is having difficulty with bowel movements.  Changes made to bowel regimen, adding long-acting pain medication to his regimen.  Patient reports he has made peace and is ready to die.   Hospice discussed with patient, spouse, and son at bedside.  All are in agreement and would like an informational meeting.  Case management consult entered.  Patient is supposed to have follow up with Oncology tomorrow for the results of his PET scan, and we have informed family that even if patient is unable to make this appointment, a telephone conference should be held to receive  definitive results.    Interval History: Patient seen and examined.    Review of Systems   Constitutional:  Positive for fatigue. Negative for activity change, appetite change, chills and fever.   HENT:  Negative for ear pain, mouth sores, nosebleeds and sore throat.    Eyes:  Negative for visual disturbance.   Respiratory:  Negative for cough, shortness of breath and wheezing.    Cardiovascular:  Negative for chest pain, palpitations and leg swelling.   Gastrointestinal:  Positive for abdominal pain, constipation, nausea and vomiting. Negative for abdominal distention, blood in stool and diarrhea.   Endocrine: Negative for polyphagia.   Genitourinary:  Negative for difficulty urinating, dysuria, flank pain and frequency.   Musculoskeletal:  Positive for joint swelling and myalgias. Negative for arthralgias and back pain.   Skin:  Positive for wound. Negative for rash.   Neurological:  Positive for weakness. Negative for dizziness, tremors, seizures, syncope, facial asymmetry, speech difficulty and headaches.   Hematological:  Negative for adenopathy.   Psychiatric/Behavioral:  Positive for confusion. Negative for agitation and hallucinations. The patient is nervous/anxious.      Objective:     Vital Signs (Most Recent):  Temp: 97.7 °F (36.5 °C) (03/04/24 0739)  Pulse: 99 (03/04/24 0739)  Resp: 18 (03/04/24 0739)  BP: 130/79 (03/04/24 0739)  SpO2: (!) 94 % (03/04/24 0739) Vital Signs (24h Range):  Temp:  [97.4 °F (36.3 °C)-98 °F (36.7 °C)] 97.7 °F (36.5 °C)  Pulse:  [] 99  Resp:  [16-23] 18  SpO2:  [93 %-97 %] 94 %  BP: (108-130)/(73-79) 130/79     Weight: 58.3 kg (128 lb 8 oz)  Body mass index is 18.44 kg/m².    Intake/Output Summary (Last 24 hours) at 3/4/2024 0919  Last data filed at 3/4/2024 0601  Gross per 24 hour   Intake 1902.88 ml   Output --   Net 1902.88 ml           Physical Exam  Constitutional:       General: He is awake. He is not in acute distress.     Appearance: He is cachectic. He is  ill-appearing (Frail, chronically ill-appearing). He is not toxic-appearing.      Comments: Tobacco stains under fingernails   HENT:      Head: Normocephalic and atraumatic.      Right Ear: Decreased hearing noted.      Left Ear: Decreased hearing noted.      Nose: No congestion or rhinorrhea.      Mouth/Throat:      Pharynx: No oropharyngeal exudate.      Comments: Tobacco stains to dentures  Eyes:      General: No scleral icterus.  Neck:      Vascular: No carotid bruit.   Cardiovascular:      Rate and Rhythm: Normal rate and regular rhythm.      Pulses: Normal pulses.      Heart sounds: Normal heart sounds. No murmur heard.     No friction rub. No gallop.   Pulmonary:      Effort: Pulmonary effort is normal. No respiratory distress.      Breath sounds: Normal breath sounds. No stridor. No wheezing, rhonchi or rales.   Chest:      Chest wall: No tenderness.   Abdominal:      General: There is distension.      Palpations: There is no mass.      Tenderness: There is abdominal tenderness (diffuse tenderness to palpation). There is no right CVA tenderness, left CVA tenderness, guarding or rebound.      Hernia: No hernia is present.   Musculoskeletal:         General: No swelling, tenderness or deformity.      Cervical back: No rigidity.      Right lower leg: No edema.      Left lower leg: No edema.   Lymphadenopathy:      Cervical: No cervical adenopathy.   Skin:     General: Skin is warm and dry.      Capillary Refill: Capillary refill takes less than 2 seconds.      Coloration: Skin is pale. Skin is not jaundiced.      Findings: Bruising present. No rash.   Neurological:      Mental Status: He is alert. Mental status is at baseline.      Motor: Weakness present.      Gait: Gait abnormal.             Significant Labs: All pertinent labs within the past 24 hours have been reviewed.  Recent Lab Results         03/04/24  0511   03/03/24  1115        Albumin 1.9         ALP 94         ALT 9         Anion Gap 12          AST 11         Baso # 0.03         Basophil % 0.2         BILIRUBIN TOTAL 1.0  Comment: For infants and newborns, interpretation of results should be based  on gestational age, weight and in agreement with clinical  observations.    Premature Infant recommended reference ranges:  Up to 24 hours.............<8.0 mg/dL  Up to 48 hours............<12.0 mg/dL  3-5 days..................<15.0 mg/dL  6-29 days.................<15.0 mg/dL    For patients on Eltrombopag therapy, use of Dimension [place] TBIL is   not   recommended.           BUN 23         Calcium 8.5         Chloride 118         CO2 15         Creatinine 1.0         Differential Method Automated         eGFR >60.0         Eos # 0.0         Eos % 0.2         Glucose 79         Gran # (ANC) 11.6         Gran % 92.5         Hematocrit 39.6         Hemoglobin 12.7         Immature Grans (Abs) 0.06  Comment: Mild elevation in immature granulocytes is non specific and   can be seen in a variety of conditions including stress response,   acute inflammation, trauma and pregnancy. Correlation with other   laboratory and clinical findings is essential.           Immature Granulocytes 0.5         Lymph # 0.4         Lymph % 3.4         Magnesium  2.1         MCH 34.3         MCHC 32.1                  Mono # 0.4         Mono % 3.2         MPV 9.6         nRBC 0         Platelet Count 364         Potassium 3.7         PROTEIN TOTAL 5.9         RBC 3.70         RDW 13.2         Sodium 145         Vitamin B12   2667  Comment: ATTENTION: The use of Biotin Supplements may interfere with this   assay.         WBC 12.50                 Significant Imaging: I have reviewed all pertinent imaging results/findings within the past 24 hours.    Assessment/Plan:      * Dehydration  Patient noted with mild dehydration on labs.  Patient admitted for IV fluids, antiemetics.    3/4 GT: Continue with IV fluids at current rate.      Frequent falls  Therapy services  consulted.      Hard of hearing  Noted.  Safety precautions.      Malignant neoplasm of body of stomach  S/p partial gastrectomy 07/12/2022.  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  Patient had follow up with oncologist on 02/19/2024 and had a PET-CT performed last week.  Patient has upcoming appointment on Tuesday to receive the results of the PET-CT.  Due to advanced age, patient will be a poor candidate for palliative systemic chemotherapy.    3/4/GT:  Case management consult for informational hospice meeting entered.      HTN (hypertension), benign  Chronic, controlled. Latest blood pressure and vitals reviewed-     Temp:  [97.4 °F (36.3 °C)-98 °F (36.7 °C)]   Pulse:  []   Resp:  [16-23]   BP: (108-130)/(73-79)   SpO2:  [93 %-97 %] .       VTE Risk Mitigation (From admission, onward)           Ordered     enoxaparin injection 40 mg  Every 24 hours         03/01/24 1347     IP VTE HIGH RISK PATIENT  Once         03/01/24 1315     Place sequential compression device  Until discontinued         03/01/24 1315                    Discharge Planning   PHILLIP:      Code Status: DNR   Is the patient medically ready for discharge?:     Reason for patient still in hospital (select all that apply): Patient trending condition, Treatment, Consult recommendations, and Pending disposition  Discharge Plan A: Home with family                  FITO GRANADOS  Department of Hospital Medicine   [facility] Surg

## 2024-03-04 NOTE — PLAN OF CARE
Problem: Occupational Therapy  Goal: Occupational Therapy Goal  Description: Goals to be met by: 03/08/24     Patient will increase functional independence with ADLs by performing:    Feeding with Modified [place].  UE Dressing with Set-up Assistance.  LE Dressing with Set-up Assistance.  Grooming while seated at sink with Modified [place].  Toileting from toilet with Set-up Assistance for hygiene and clothing management.   Bathing from  shower chair/bench with Supervision.  Toilet transfer to toilet with Set-up Assistance.    Outcome: Ongoing, Progressing

## 2024-03-04 NOTE — SUBJECTIVE & OBJECTIVE
Interval History: Patient seen and examined.    Review of Systems   Constitutional:  Positive for fatigue. Negative for activity change, appetite change, chills and fever.   HENT:  Negative for ear pain, mouth sores, nosebleeds and sore throat.    Eyes:  Negative for visual disturbance.   Respiratory:  Negative for cough, shortness of breath and wheezing.    Cardiovascular:  Negative for chest pain, palpitations and leg swelling.   Gastrointestinal:  Positive for abdominal pain, constipation, nausea and vomiting. Negative for abdominal distention, blood in stool and diarrhea.   Endocrine: Negative for polyphagia.   Genitourinary:  Negative for difficulty urinating, dysuria, flank pain and frequency.   Musculoskeletal:  Positive for joint swelling and myalgias. Negative for arthralgias and back pain.   Skin:  Positive for wound. Negative for rash.   Neurological:  Positive for weakness. Negative for dizziness, tremors, seizures, syncope, facial asymmetry, speech difficulty and headaches.   Hematological:  Negative for adenopathy.   Psychiatric/Behavioral:  Positive for confusion. Negative for agitation and hallucinations. The patient is nervous/anxious.      Objective:     Vital Signs (Most Recent):  Temp: 97.7 °F (36.5 °C) (03/04/24 0739)  Pulse: 99 (03/04/24 0739)  Resp: 18 (03/04/24 0739)  BP: 130/79 (03/04/24 0739)  SpO2: (!) 94 % (03/04/24 0739) Vital Signs (24h Range):  Temp:  [97.4 °F (36.3 °C)-98 °F (36.7 °C)] 97.7 °F (36.5 °C)  Pulse:  [] 99  Resp:  [16-23] 18  SpO2:  [93 %-97 %] 94 %  BP: (108-130)/(73-79) 130/79     Weight: 58.3 kg (128 lb 8 oz)  Body mass index is 18.44 kg/m².    Intake/Output Summary (Last 24 hours) at 3/4/2024 0919  Last data filed at 3/4/2024 0601  Gross per 24 hour   Intake 1902.88 ml   Output --   Net 1902.88 ml           Physical Exam  Constitutional:       General: He is awake. He is not in acute distress.     Appearance: He is cachectic. He is ill-appearing (Frail,  chronically ill-appearing). He is not toxic-appearing.      Comments: Tobacco stains under fingernails   HENT:      Head: Normocephalic and atraumatic.      Right Ear: Decreased hearing noted.      Left Ear: Decreased hearing noted.      Nose: No congestion or rhinorrhea.      Mouth/Throat:      Pharynx: No oropharyngeal exudate.      Comments: Tobacco stains to dentures  Eyes:      General: No scleral icterus.  Neck:      Vascular: No carotid bruit.   Cardiovascular:      Rate and Rhythm: Normal rate and regular rhythm.      Pulses: Normal pulses.      Heart sounds: Normal heart sounds. No murmur heard.     No friction rub. No gallop.   Pulmonary:      Effort: Pulmonary effort is normal. No respiratory distress.      Breath sounds: Normal breath sounds. No stridor. No wheezing, rhonchi or rales.   Chest:      Chest wall: No tenderness.   Abdominal:      General: There is distension.      Palpations: There is no mass.      Tenderness: There is abdominal tenderness (diffuse tenderness to palpation). There is no right CVA tenderness, left CVA tenderness, guarding or rebound.      Hernia: No hernia is present.   Musculoskeletal:         General: No swelling, tenderness or deformity.      Cervical back: No rigidity.      Right lower leg: No edema.      Left lower leg: No edema.   Lymphadenopathy:      Cervical: No cervical adenopathy.   Skin:     General: Skin is warm and dry.      Capillary Refill: Capillary refill takes less than 2 seconds.      Coloration: Skin is pale. Skin is not jaundiced.      Findings: Bruising present. No rash.   Neurological:      Mental Status: He is alert. Mental status is at baseline.      Motor: Weakness present.      Gait: Gait abnormal.             Significant Labs: All pertinent labs within the past 24 hours have been reviewed.  Recent Lab Results         03/04/24  0511   03/03/24  1115        Albumin 1.9         ALP 94         ALT 9         Anion Gap 12         AST 11         Baso #  0.03         Basophil % 0.2         BILIRUBIN TOTAL 1.0  Comment: For infants and newborns, interpretation of results should be based  on gestational age, weight and in agreement with clinical  observations.    Premature Infant recommended reference ranges:  Up to 24 hours.............<8.0 mg/dL  Up to 48 hours............<12.0 mg/dL  3-5 days..................<15.0 mg/dL  6-29 days.................<15.0 mg/dL    For patients on Eltrombopag therapy, use of Dimension [place] TBIL is   not   recommended.           BUN 23         Calcium 8.5         Chloride 118         CO2 15         Creatinine 1.0         Differential Method Automated         eGFR >60.0         Eos # 0.0         Eos % 0.2         Glucose 79         Gran # (ANC) 11.6         Gran % 92.5         Hematocrit 39.6         Hemoglobin 12.7         Immature Grans (Abs) 0.06  Comment: Mild elevation in immature granulocytes is non specific and   can be seen in a variety of conditions including stress response,   acute inflammation, trauma and pregnancy. Correlation with other   laboratory and clinical findings is essential.           Immature Granulocytes 0.5         Lymph # 0.4         Lymph % 3.4         Magnesium  2.1         MCH 34.3         MCHC 32.1                  Mono # 0.4         Mono % 3.2         MPV 9.6         nRBC 0         Platelet Count 364         Potassium 3.7         PROTEIN TOTAL 5.9         RBC 3.70         RDW 13.2         Sodium 145         Vitamin B12   2667  Comment: ATTENTION: The use of Biotin Supplements may interfere with this   assay.         WBC 12.50                 Significant Imaging: I have reviewed all pertinent imaging results/findings within the past 24 hours.

## 2024-03-04 NOTE — NURSING
Upon rounds,nurse found patient spitting out some coffee grind emesis. Nurse questioned, what was coming out his mouth. Patient's son reported it was chewing tobacco. Provided education to son and patient. Verbalized understanding.

## 2024-03-04 NOTE — PT/OT/SLP PROGRESS
Occupational Therapy   Treatment    Name: Rayo Duran  MRN: [id number]  Admitting Diagnosis:  Dehydration       Recommendations:     Discharge Recommendations:  (none hospice care)  Discharge Equipment Recommendations:  none  Barriers to discharge:  None    Assessment:     Rayo Duran is a 88 y.o. male with a medical diagnosis of Dehydration.  He presents with fair participation. Performance deficits affecting function are weakness, impaired endurance, impaired self care skills, impaired functional mobility, gait instability, impaired balance, decreased upper extremity function, decreased lower extremity function, decreased safety awareness, decreased ROM, impaired cardiopulmonary response to activity.     Rehab Prognosis:  Fair; patient would benefit from acute skilled OT services to address these deficits and reach maximum level of function.       Plan:     Patient to be seen 5 x/week to address the above listed problems via self-care/home management, therapeutic activities, therapeutic exercises  Plan of Care Expires: 03/08/24  Plan of Care Reviewed with: patient, spouse, son    Subjective     Chief Complaint: Reported none  Patient/Family Comments/goals: Reported none  Pain/Comfort:  Pain Rating 1: 0/10  Pain Rating Post-Intervention 1: 0/10    Objective:     Communicated with: occupational therapist and nurse prior to session.  Patient found HOB elevated with peripheral IV upon OT entry to room.    General Precautions: Standard, fall, hearing impaired    Orthopedic Precautions:N/A  Braces: N/A  Respiratory Status: Room air     Occupational Performance:     Bed Mobility:    Patient completed Rolling/Turning to Left with  supervision  Patient completed Rolling/Turning to Right with supervision  Patient completed Scooting/Bridging with supervision  Patient completed Supine to Sit with supervision  Patient completed Sit to Supine with supervision     Functional Mobility/Transfers:  Patient completed Sit <> Stand  Transfer with stand by assistance  with  rolling walker   Patient completed Bed <> Chair Transfer using Step Transfer technique with stand by assistance with rolling walker  Functional Mobility: Pt ambulated to chair for linens to be changed while completed ADL tasks.     Activities of Daily Living:  Grooming: modified independence shaving face  Lower Body Dressing: minimum assistance for donning/doffing bilateral socks      [facility] 6 Click ADL: 21    Treatment & Education:  Pt was provided education / instruction regarding role of OT and established OT POC.       Patient left HOB elevated with all lines intact, call button in reach, nurse notified, and family present at bedside    GOALS:   Multidisciplinary Problems       Occupational Therapy Goals          Problem: Occupational Therapy    Goal Priority Disciplines Outcome Interventions   Occupational Therapy Goal     OT, PT/OT Ongoing, Progressing    Description: Goals to be met by: 03/08/24     Patient will increase functional independence with ADLs by performing:    Feeding with Modified [place].  UE Dressing with Set-up Assistance.  LE Dressing with Set-up Assistance.  Grooming while seated at sink with Modified [place].  Toileting from toilet with Set-up Assistance for hygiene and clothing management.   Bathing from  shower chair/bench with Supervision.  Toilet transfer to toilet with Set-up Assistance.                         Time Tracking:     OT Date of Treatment: 03/04/24  OT Start Time: 0924  OT Stop Time: 0942  OT Total Time (min): 18 min    Billable Minutes:Self Care/Home Management 18 min    OT/ANITA: ANITA     Number of ANITA visits since last OT visit: 1    3/4/2024  Joy JALLOH

## 2024-03-04 NOTE — ASSESSMENT & PLAN NOTE
Patient noted with mild dehydration on labs.  Patient admitted for IV fluids, antiemetics.    3/4 GT: Continue with IV fluids at current rate.

## 2024-03-04 NOTE — PLAN OF CARE
Problem: Occupational Therapy  Goal: Occupational Therapy Goal  Description: Goals to be met by: 03/08/24     Patient will increase functional independence with ADLs by performing:    Feeding with Modified [place].  UE Dressing with Set-up Assistance.  LE Dressing with Set-up Assistance.  Grooming while seated at sink with Modified [place].  Toileting from toilet with Set-up Assistance for hygiene and clothing management.   Bathing from  shower chair/bench with Supervision.  Toilet transfer to toilet with Set-up Assistance.    Outcome: Unable to Meet, Discharged

## 2024-03-04 NOTE — PLAN OF CARE
POC reviewed w/ pt and purposeful rounding complete. Meds administered per MAR. VSS on RA. Pt refused enema this shift. PRN mirilax given. PRN morphine given for pain w/ relief obtained. PRN compazine given for nausea w/ some relief obtained. Pt denies needs at this time.   Problem: Adult Inpatient Plan of Care  Goal: Plan of Care Review  Outcome: Ongoing, Progressing  Goal: Patient-Specific Goal (Individualized)  Outcome: Ongoing, Progressing  Goal: Absence of Hospital-Acquired Illness or Injury  Outcome: Ongoing, Progressing  Goal: Optimal Comfort and Wellbeing  Outcome: Ongoing, Progressing  Goal: Readiness for Transition of Care  Outcome: Ongoing, Progressing     Problem: Fall Injury Risk  Goal: Absence of Fall and Fall-Related Injury  Outcome: Ongoing, Progressing     Problem: Skin Injury Risk Increased  Goal: Skin Health and Integrity  Outcome: Ongoing, Progressing

## 2024-03-05 NOTE — ASSESSMENT & PLAN NOTE
S/p partial gastrectomy 07/12/2022.  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  Patient had follow up with oncologist on 02/19/2024 and had a PET-CT performed last week.  Patient has upcoming appointment on Tuesday to receive the results of the PET-CT.  Due to advanced age, patient will be a poor candidate for palliative systemic chemotherapy.    3/4/GT:  Case management consult for informational hospice meeting entered.  3/5 GT:  Family in agreement with hospice.  DME has been delivered to patient's house per report from son.  We will keep patient here 1 more day, and have informed son that he needs to be discharged home with hospice afterwards.

## 2024-03-05 NOTE — PLAN OF CARE
Problem: Adult Inpatient Plan of Care  Goal: Optimal Comfort and Wellbeing  Outcome: Ongoing, Not Progressing  Goal: Readiness for Transition of Care  Outcome: Ongoing, Not Progressing         Problem: Adult Inpatient Plan of Care  Goal: Plan of Care Review  Outcome: Ongoing, Progressing  Goal: Patient-Specific Goal (Individualized)  Outcome: Ongoing, Progressing  Goal: Absence of Hospital-Acquired Illness or Injury  Outcome: Ongoing, Progressing     Problem: Fall Injury Risk  Goal: Absence of Fall and Fall-Related Injury  Outcome: Ongoing, Progressing     Problem: Skin Injury Risk Increased  Goal: Skin Health and Integrity  Outcome: Ongoing, Progressing

## 2024-03-05 NOTE — NURSING
Pita Valentine,RN went into room after being called in there by pt.  Pt was 82% on RA.  Put on 3 L/NC to bring sats up to 91-92%.  RT called and came to room.  Pt pulled up in bed.  Pt calm now.

## 2024-03-05 NOTE — PLAN OF CARE
03/05/24 1521   Medicare Message   Important Message from Medicare regarding Discharge Appeal Rights Given to patient/caregiver;Explained to patient/caregiver;Signed/date by patient/caregiver   Date IMM was signed 03/05/24   Time IMM was signed 1500

## 2024-03-05 NOTE — PLAN OF CARE
24 1622   Post-Acute Status   Post-Acute Authorization Hospice   Hospice Status Referrals Sent   Patient choice form signed by patient/caregiver   (Sent to all agencies in radius of home.)   Discharge Delays (!) Patient and Family Barriers  (post death plans)   Discharge Plan   Discharge Plan A Hospice/home   Discharge Plan B Home with family     Referrals sent to:   [facility] [place], [facility], [facility] [place], [place]     Hosp[facility], [facility]  [place], [facility] hospice not willing to take patient any longer due to post death plans. [facility] requires  home sign on and son would like to bring patient over state lines without going through a  home.      Team updated on status.

## 2024-03-05 NOTE — ASSESSMENT & PLAN NOTE
Chronic, controlled. Latest blood pressure and vitals reviewed-     Temp:  [96.9 °F (36.1 °C)-97.6 °F (36.4 °C)]   Pulse:  []   Resp:  [16-24]   BP: (113-125)/(65-83)   SpO2:  [91 %-97 %] .

## 2024-03-05 NOTE — PROGRESS NOTES
"[facility] Medicine  Progress Note    Patient Name: Rayo Duran  MRN: [id number]  Patient Class: IP- Inpatient   Admission Date: 3/1/2024  Length of Stay: 3 days  Attending Physician: Caroline Florez MD  Primary Care Provider: Caroline Florez MD        Subjective:     Principal Problem:Dehydration        HPI:  ED HPI: 88-year-old male with a history of stomach cancer presents the ER with abdominal pain has been going on for "a very long time" now associated with nausea and vomiting that has been going on for awhile as well.  Denies any fever.  No blood in stool or vomit.  Not ill appearing, talking in full sentences without issue.  No chest pain or shortness of breath.  Describes the pain as diffuse.  Was seen here a few weeks ago for same issue, full workup done including CT scan.  See results:     Impression:     1. Previous gastric surgery  2. New mild to moderate ascites around the liver and spleen and in the pericolic gutters and pelvis. There is some heterogeneity of the omentum anteriorly which could just be due to the fluid but cannot exclude malignant ascites or omental metastasis.  3. Diverticulosis but no diverticulitis  4. Cholecystectomy  5. 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  Patient has been evaluated in the clinic twice within the last month for urinary issues, weight loss, dehydration.  Patient was given 1 L of IV fluids during most recent clinic visit for dehydration, and had labs performed with Oncology later that afternoon.  Patient had a follow up with oncologist on 02/19/2024 and had a PET scan last week.  Patient's daughter who is present at bedside " reports they have an upcoming appointment next Tuesday for the PET scan results.  Per oncology note, he does not feel patient would be an appropriate candidate for systemic palliative chemotherapy due to advanced age.  Labs obtained in the emergency department reviewed.  Patient noted with a mild dehydration, and reports improvement in abdominal pain and weakness after administration of IV fluids.  Patient reports poor appetite, minimal oral intake, severe weakness with recurrent falls.  Patient admitted for rehydration with IVF, antiemetics, monitoring.    Overview/Hospital Course:  3/2 AA, weekend crosscover, patient history of gastric adenocarcinoma, history of partial gastrectomy, came in with abdominal pain nausea vomiting, dehydration, following with Oncology for gastric mass, planning for follow-up with oncologist to discuss results, blood pressure stable, complains of abdominal spasm and pain, pain meds adjusted  3/3 AA, weekend crosscover, no acute events overnight, patient did have small bowel movement with enema yesterday, patient wanting to repeat enema today, blood pressure stable, noted elevated , check B12 level, renal function continuing to improve, continue pain control, IV fluids, discharge planning  3/4 GT:  Patient assessed with wife and son at bedside this morning.  Patient is complaining of abdominal discomfort.  Patient is having difficulty with bowel movements.  Changes made to bowel regimen, adding long-acting pain medication to his regimen.  Patient reports he has made peace and is ready to die.   Hospice discussed with patient, spouse, and son at bedside.  All are in agreement and would like an informational meeting.  Case management consult entered.  Patient is supposed to have follow up with Oncology tomorrow for the results of his PET scan, and we have informed family that even if patient is unable to make this appointment, a telephone conference should be held to receive  definitive results.  3/5 GT:  Patient is lethargic this morning but arousable.  Patient had some hematemesis yesterday, Lovenox was discontinued, Protonix was increased to twice a day, Carafate was added to his medication regimen.  Patient's pain seems to be controlled with long-acting MS Contin that was added yesterday.  Patient did require supplemental oxygen overnight, and is still on nasal cannula this morning.  This morning nursing informed us that patient's son is refusing oral medications because he was fearful that the patient will choke.  Patient's long-acting medication is oral, not transitioning to higher doses of IV pain medication, and nursing was instructed to administer oral MS Contin.  We will keep patient 1 more day, informed son that eventually he needs to be discharged home with hospice.    Interval History: Patient seen and examined.    Review of Systems   Constitutional:  Positive for fatigue. Negative for activity change, appetite change, chills and fever.   HENT:  Negative for ear pain, mouth sores, nosebleeds and sore throat.    Eyes:  Negative for visual disturbance.   Respiratory:  Negative for cough, shortness of breath and wheezing.    Cardiovascular:  Negative for chest pain, palpitations and leg swelling.   Gastrointestinal:  Positive for abdominal pain, constipation, nausea and vomiting. Negative for abdominal distention, blood in stool and diarrhea.   Endocrine: Negative for polyphagia.   Genitourinary:  Negative for difficulty urinating, dysuria, flank pain and frequency.   Musculoskeletal:  Positive for joint swelling and myalgias. Negative for arthralgias and back pain.   Skin:  Positive for wound. Negative for rash.   Neurological:  Positive for weakness. Negative for dizziness, tremors, seizures, syncope, facial asymmetry, speech difficulty and headaches.   Hematological:  Negative for adenopathy.   Psychiatric/Behavioral:  Positive for confusion. Negative for agitation and  hallucinations. The patient is nervous/anxious.      Objective:     Vital Signs (Most Recent):  Temp: 96.9 °F (36.1 °C) (03/05/24 0731)  Pulse: 106 (03/05/24 0752)  Resp: 20 (03/05/24 0917)  BP: 113/65 (03/05/24 0731)  SpO2: 97 % (03/05/24 0752) Vital Signs (24h Range):  Temp:  [96.9 °F (36.1 °C)-97.6 °F (36.4 °C)] 96.9 °F (36.1 °C)  Pulse:  [] 106  Resp:  [16-24] 20  SpO2:  [91 %-97 %] 97 %  BP: (113-125)/(65-83) 113/65     Weight: 58.3 kg (128 lb 8 oz)  Body mass index is 18.44 kg/m².    Intake/Output Summary (Last 24 hours) at 3/5/2024 0941  Last data filed at 3/5/2024 0600  Gross per 24 hour   Intake 1510 ml   Output 3 ml   Net 1507 ml           Physical Exam  Constitutional:       General: He is awake. He is not in acute distress.     Appearance: He is cachectic. He is ill-appearing (Frail, chronically ill-appearing). He is not toxic-appearing.      Interventions: Nasal cannula in place.      Comments: Tobacco stains under fingernails   HENT:      Head: Normocephalic and atraumatic.      Right Ear: Decreased hearing noted.      Left Ear: Decreased hearing noted.      Nose: No congestion or rhinorrhea.      Mouth/Throat:      Pharynx: No oropharyngeal exudate.      Comments: Tobacco stains to dentures  Eyes:      General: No scleral icterus.  Neck:      Vascular: No carotid bruit.   Cardiovascular:      Rate and Rhythm: Normal rate and regular rhythm.      Pulses: Normal pulses.      Heart sounds: Normal heart sounds. No murmur heard.     No friction rub. No gallop.   Pulmonary:      Effort: Pulmonary effort is normal. No respiratory distress.      Breath sounds: Normal breath sounds. No stridor. No wheezing, rhonchi or rales.   Chest:      Chest wall: No tenderness.   Abdominal:      General: There is distension.      Palpations: There is no mass.      Tenderness: There is abdominal tenderness (diffuse tenderness to palpation). There is no right CVA tenderness, left CVA tenderness, guarding or rebound.       Hernia: No hernia is present.   Musculoskeletal:         General: No swelling, tenderness or deformity.      Cervical back: No rigidity.      Right lower leg: No edema.      Left lower leg: No edema.   Lymphadenopathy:      Cervical: No cervical adenopathy.   Skin:     General: Skin is warm and dry.      Capillary Refill: Capillary refill takes less than 2 seconds.      Coloration: Skin is pale. Skin is not jaundiced.      Findings: Bruising present. No rash.   Neurological:      Mental Status: He is easily aroused. Mental status is at baseline. He is lethargic.      Motor: Weakness present.      Gait: Gait abnormal.             Significant Labs: All pertinent labs within the past 24 hours have been reviewed.  Recent Lab Results         03/05/24  0528        Albumin 1.9              ALT 11       Anion Gap 9       AST 16       Bands 20.0       Basophil % 0.0       BILIRUBIN TOTAL 1.0  Comment: For infants and newborns, interpretation of results should be based  on gestational age, weight and in agreement with clinical  observations.    Premature Infant recommended reference ranges:  Up to 24 hours.............<8.0 mg/dL  Up to 48 hours............<12.0 mg/dL  3-5 days..................<15.0 mg/dL  6-29 days.................<15.0 mg/dL    For patients on Eltrombopag therapy, use of Dimension [place] TBIL is   not   recommended.         BUN 26       Calcium 9.1       Chloride 120       CO2 17       Creatinine 1.2       Differential Method Manual  Comment: CORRECTED RESULT; previously reported as Automated on 03/05/2024 at   07:14.    [C]       eGFR 58.9       Eos % 0.0       Giant Platelets Present       Glucose 90       Gran % 76.0       Hematocrit 42.9       Hemoglobin 13.6       Immature Grans (Abs) CANCELED  Comment: Mild elevation in immature granulocytes is non specific and   can be seen in a variety of conditions including stress response,   acute inflammation, trauma and pregnancy. Correlation with other    laboratory and clinical findings is essential.    Result canceled by the ancillary.         Immature Granulocytes CANCELED  Comment: Result canceled by the ancillary.       Lymph % 2.0       Magnesium  2.2       MCH 33.7       MCHC 31.7              Mono % 2.0       MPV 9.8       nRBC 0       Platelet Count 363       Potassium 3.7       PROTEIN TOTAL 6.2       RBC 4.03       RDW 13.4       Sodium 146       WBC 9.85                [C] - Corrected Result               Significant Imaging: I have reviewed all pertinent imaging results/findings within the past 24 hours.    Assessment/Plan:      * Dehydration  Patient noted with mild dehydration on labs.  Patient admitted for IV fluids, antiemetics.    3/4 GT: Continue with IV fluids at current rate.  3/5 GT:  IV fluids discontinued due to patient becoming overloaded last night.      Frequent falls  Therapy services consulted.      Hard of hearing  Noted.  Safety precautions.      Malignant neoplasm of body of stomach  S/p partial gastrectomy 07/12/2022.  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  Patient had follow up with oncologist on 02/19/2024 and had a PET-CT performed last week.  Patient has upcoming appointment on Tuesday to receive the results of the PET-CT.  Due to advanced age, patient will be a poor candidate for palliative systemic chemotherapy.    3/4/GT:  Case management consult for informational hospice meeting entered.  3/5 GT:  Family in agreement with hospice.  DME has been delivered to patient's house per report from son.  We will keep patient here 1 more day, and have informed son that he needs to be discharged home with hospice afterwards.      HTN (hypertension), benign  Chronic, controlled. Latest blood pressure and vitals reviewed-     Temp:  [96.9 °F (36.1 °C)-97.6 °F (36.4 °C)]   Pulse:  []   Resp:  [16-24]   BP: (113-125)/(65-83)   SpO2:  [91 %-97 %] .       VTE Risk Mitigation (From  admission, onward)           Ordered     IP VTE HIGH RISK PATIENT  Once         03/01/24 1315     Place sequential compression device  Until discontinued         03/01/24 1315                    Discharge Planning   PHILLIP:      Code Status: DNR   Is the patient medically ready for discharge?:     Reason for patient still in hospital (select all that apply): Patient trending condition, Treatment, and Pending disposition  Discharge Plan A: Home with family                  FITO GRANADOS  Department of Hospital Medicine   [facility]

## 2024-03-05 NOTE — PLAN OF CARE
Spoke to Marcial gannon [facility] who reports that this patient's referral is still under review due.

## 2024-03-05 NOTE — ASSESSMENT & PLAN NOTE
Patient noted with mild dehydration on labs.  Patient admitted for IV fluids, antiemetics.    3/4 GT: Continue with IV fluids at current rate.  3/5 GT:  IV fluids discontinued due to patient becoming overloaded last night.

## 2024-03-05 NOTE — SUBJECTIVE & OBJECTIVE
Interval History: Patient seen and examined.    Review of Systems   Constitutional:  Positive for fatigue. Negative for activity change, appetite change, chills and fever.   HENT:  Negative for ear pain, mouth sores, nosebleeds and sore throat.    Eyes:  Negative for visual disturbance.   Respiratory:  Negative for cough, shortness of breath and wheezing.    Cardiovascular:  Negative for chest pain, palpitations and leg swelling.   Gastrointestinal:  Positive for abdominal pain, constipation, nausea and vomiting. Negative for abdominal distention, blood in stool and diarrhea.   Endocrine: Negative for polyphagia.   Genitourinary:  Negative for difficulty urinating, dysuria, flank pain and frequency.   Musculoskeletal:  Positive for joint swelling and myalgias. Negative for arthralgias and back pain.   Skin:  Positive for wound. Negative for rash.   Neurological:  Positive for weakness. Negative for dizziness, tremors, seizures, syncope, facial asymmetry, speech difficulty and headaches.   Hematological:  Negative for adenopathy.   Psychiatric/Behavioral:  Positive for confusion. Negative for agitation and hallucinations. The patient is nervous/anxious.      Objective:     Vital Signs (Most Recent):  Temp: 96.9 °F (36.1 °C) (03/05/24 0731)  Pulse: 106 (03/05/24 0752)  Resp: 20 (03/05/24 0917)  BP: 113/65 (03/05/24 0731)  SpO2: 97 % (03/05/24 0752) Vital Signs (24h Range):  Temp:  [96.9 °F (36.1 °C)-97.6 °F (36.4 °C)] 96.9 °F (36.1 °C)  Pulse:  [] 106  Resp:  [16-24] 20  SpO2:  [91 %-97 %] 97 %  BP: (113-125)/(65-83) 113/65     Weight: 58.3 kg (128 lb 8 oz)  Body mass index is 18.44 kg/m².    Intake/Output Summary (Last 24 hours) at 3/5/2024 0941  Last data filed at 3/5/2024 0600  Gross per 24 hour   Intake 1510 ml   Output 3 ml   Net 1507 ml           Physical Exam  Constitutional:       General: He is awake. He is not in acute distress.     Appearance: He is cachectic. He is ill-appearing (Frail, chronically  ill-appearing). He is not toxic-appearing.      Interventions: Nasal cannula in place.      Comments: Tobacco stains under fingernails   HENT:      Head: Normocephalic and atraumatic.      Right Ear: Decreased hearing noted.      Left Ear: Decreased hearing noted.      Nose: No congestion or rhinorrhea.      Mouth/Throat:      Pharynx: No oropharyngeal exudate.      Comments: Tobacco stains to dentures  Eyes:      General: No scleral icterus.  Neck:      Vascular: No carotid bruit.   Cardiovascular:      Rate and Rhythm: Normal rate and regular rhythm.      Pulses: Normal pulses.      Heart sounds: Normal heart sounds. No murmur heard.     No friction rub. No gallop.   Pulmonary:      Effort: Pulmonary effort is normal. No respiratory distress.      Breath sounds: Normal breath sounds. No stridor. No wheezing, rhonchi or rales.   Chest:      Chest wall: No tenderness.   Abdominal:      General: There is distension.      Palpations: There is no mass.      Tenderness: There is abdominal tenderness (diffuse tenderness to palpation). There is no right CVA tenderness, left CVA tenderness, guarding or rebound.      Hernia: No hernia is present.   Musculoskeletal:         General: No swelling, tenderness or deformity.      Cervical back: No rigidity.      Right lower leg: No edema.      Left lower leg: No edema.   Lymphadenopathy:      Cervical: No cervical adenopathy.   Skin:     General: Skin is warm and dry.      Capillary Refill: Capillary refill takes less than 2 seconds.      Coloration: Skin is pale. Skin is not jaundiced.      Findings: Bruising present. No rash.   Neurological:      Mental Status: He is easily aroused. Mental status is at baseline. He is lethargic.      Motor: Weakness present.      Gait: Gait abnormal.             Significant Labs: All pertinent labs within the past 24 hours have been reviewed.  Recent Lab Results         03/05/24  0528        Albumin 1.9              ALT 11       Anion  Gap 9       AST 16       Bands 20.0       Basophil % 0.0       BILIRUBIN TOTAL 1.0  Comment: For infants and newborns, interpretation of results should be based  on gestational age, weight and in agreement with clinical  observations.    Premature Infant recommended reference ranges:  Up to 24 hours.............<8.0 mg/dL  Up to 48 hours............<12.0 mg/dL  3-5 days..................<15.0 mg/dL  6-29 days.................<15.0 mg/dL    For patients on Eltrombopag therapy, use of Dimension [place] TBIL is   not   recommended.         BUN 26       Calcium 9.1       Chloride 120       CO2 17       Creatinine 1.2       Differential Method Manual  Comment: CORRECTED RESULT; previously reported as Automated on 03/05/2024 at   07:14.    [C]       eGFR 58.9       Eos % 0.0       Giant Platelets Present       Glucose 90       Gran % 76.0       Hematocrit 42.9       Hemoglobin 13.6       Immature Grans (Abs) CANCELED  Comment: Mild elevation in immature granulocytes is non specific and   can be seen in a variety of conditions including stress response,   acute inflammation, trauma and pregnancy. Correlation with other   laboratory and clinical findings is essential.    Result canceled by the ancillary.         Immature Granulocytes CANCELED  Comment: Result canceled by the ancillary.       Lymph % 2.0       Magnesium  2.2       MCH 33.7       MCHC 31.7              Mono % 2.0       MPV 9.8       nRBC 0       Platelet Count 363       Potassium 3.7       PROTEIN TOTAL 6.2       RBC 4.03       RDW 13.4       Sodium 146       WBC 9.85                [C] - Corrected Result               Significant Imaging: I have reviewed all pertinent imaging results/findings within the past 24 hours.

## 2024-03-06 NOTE — SUBJECTIVE & OBJECTIVE
Interval History: Patient seen and examined.    Review of Systems   Unable to perform ROS: Acuity of condition     Objective:     Vital Signs (Most Recent):  Temp: 98.4 °F (36.9 °C) (03/06/24 0505)  Pulse: 108 (03/06/24 0750)  Resp: 16 (03/06/24 0750)  BP: (!) 90/58 (03/06/24 0520)  SpO2: (!) 93 % (03/06/24 0750) Vital Signs (24h Range):  Temp:  [97.1 °F (36.2 °C)-98.4 °F (36.9 °C)] 98.4 °F (36.9 °C)  Pulse:  [101-116] 108  Resp:  [16-24] 16  SpO2:  [92 %-95 %] 93 %  BP: ()/(58-73) 90/58     Weight: 58.3 kg (128 lb 8 oz)  Body mass index is 18.44 kg/m².    Intake/Output Summary (Last 24 hours) at 3/6/2024 0932  Last data filed at 3/6/2024 0600  Gross per 24 hour   Intake 0 ml   Output 300 ml   Net -300 ml           Physical Exam  Constitutional:       General: He is not in acute distress.     Appearance: He is cachectic. He is ill-appearing (Frail, chronically ill-appearing). He is not toxic-appearing.      Interventions: Nasal cannula in place.      Comments: Tobacco stains under fingernails   HENT:      Head: Normocephalic and atraumatic.      Right Ear: Decreased hearing noted.      Left Ear: Decreased hearing noted.      Nose: No congestion or rhinorrhea.      Mouth/Throat:      Pharynx: No oropharyngeal exudate.      Comments: Tobacco stains to dentures  Eyes:      General: No scleral icterus.  Neck:      Vascular: No carotid bruit.   Cardiovascular:      Rate and Rhythm: Normal rate and regular rhythm.      Pulses: Normal pulses.      Heart sounds: Normal heart sounds. No murmur heard.     No friction rub. No gallop.   Pulmonary:      Effort: Tachypnea and accessory muscle usage present. No respiratory distress.      Breath sounds: Normal breath sounds. No stridor. No wheezing, rhonchi or rales.      Comments: On supplemental oxygen, mouth breathing, mildly tachypneic  Chest:      Chest wall: No tenderness.   Abdominal:      General: There is distension.      Palpations: There is no mass.       Tenderness: There is no abdominal tenderness. There is no right CVA tenderness, left CVA tenderness, guarding or rebound.      Hernia: No hernia is present.   Genitourinary:     Comments: Gray with minimal dark urine output  Musculoskeletal:         General: No swelling, tenderness or deformity.      Cervical back: No rigidity.      Right lower leg: No edema.      Left lower leg: No edema.   Lymphadenopathy:      Cervical: No cervical adenopathy.   Skin:     General: Skin is warm and dry.      Capillary Refill: Capillary refill takes less than 2 seconds.      Coloration: Skin is pale. Skin is not jaundiced.      Findings: Bruising present. No rash.   Neurological:      Mental Status: Mental status is at baseline. He is lethargic.      Motor: Weakness present.      Gait: Gait normal.             Significant Labs: All pertinent labs within the past 24 hours have been reviewed.  Recent Lab Results         03/06/24  0531   03/05/24  1149        Albumin 2.0                  ALT 11         Anion Gap 7         AST 24         Baso # 0.06         Basophil % 0.4         BILIRUBIN TOTAL 1.0  Comment: For infants and newborns, interpretation of results should be based  on gestational age, weight and in agreement with clinical  observations.    Premature Infant recommended reference ranges:  Up to 24 hours.............<8.0 mg/dL  Up to 48 hours............<12.0 mg/dL  3-5 days..................<15.0 mg/dL  6-29 days.................<15.0 mg/dL    For patients on Eltrombopag therapy, use of Dimension [place] TBIL is   not   recommended.           BUN 36         Calcium 10.2         Chloride 119         CO2 19         Creatinine 1.9         Differential Method Automated         eGFR 33.9         Eos # 0.0         Eos % 0.0         Glucose 107         Gran # (ANC) 14.7         Gran % 95.8         Hematocrit 45.2         Hemoglobin 14.6         Immature Grans (Abs) 0.13  Comment: Mild elevation in immature granulocytes is non  specific and   can be seen in a variety of conditions including stress response,   acute inflammation, trauma and pregnancy. Correlation with other   laboratory and clinical findings is essential.           Immature Granulocytes 0.8         Lymph # 0.2         Lymph % 1.4         Magnesium  2.6         MCH 34.0         MCHC 32.3                  Mono # 0.2         Mono % 1.6         MPV 9.9         nRBC 0         Platelet Estimate Clumped         Platelet Count 397         POCT Glucose   94       Potassium 4.9         PROTEIN TOTAL 6.9         RBC 4.30         RDW 13.8         Sodium 145         WBC 15.34                 Significant Imaging: I have reviewed all pertinent imaging results/findings within the past 24 hours.

## 2024-03-06 NOTE — ASSESSMENT & PLAN NOTE
S/p partial gastrectomy 07/12/2022.  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  Patient had follow up with oncologist on 02/19/2024 and had a PET-CT performed last week.  Patient has upcoming appointment on Tuesday to receive the results of the PET-CT.  Due to advanced age, patient will be a poor candidate for palliative systemic chemotherapy.    3/4/GT:  Case management consult for informational hospice meeting entered.  3/5 GT:  Family in agreement with hospice.  DME has been delivered to patient's house per report from son.  We will keep patient here 1 more day, and have informed son that he needs to be discharged home with hospice afterwards.  3/6 GT:  Patient is currently transitioning.  Expect patient to expiring and neck 24-48 hours.  There is issues with the obtaining hospice care for patient due to the sons post death wishes.  Case management assisting.

## 2024-03-06 NOTE — PLAN OF CARE
03/06/24 1209   Post-Acute Status   Post-Acute Authorization Hospice  (Inpatient Hospice)   Discharge Plan   Discharge Plan A Inpatient Hospice   Discharge Plan B Other  (TBD)     New referral was sent to [facility] for possible inpatient hospice services.     Addendum: Alissa with [facility] came to meet with the patient and family. She informed me that the nurse should be here to assess the patient around 4PM. Dr. Florez and NP were notified.

## 2024-03-06 NOTE — ASSESSMENT & PLAN NOTE
Chronic, controlled. Latest blood pressure and vitals reviewed-     Temp:  [97.1 °F (36.2 °C)-98.4 °F (36.9 °C)]   Pulse:  [101-116]   Resp:  [16-24]   BP: ()/(58-73)   SpO2:  [92 %-95 %] .

## 2024-03-06 NOTE — PROGRESS NOTES
"[facility] Medicine  Progress Note    Patient Name: Rayo Duran  MRN: [id number]  Patient Class: IP- Inpatient   Admission Date: 3/1/2024  Length of Stay: 4 days  Attending Physician: Caroline Florez MD  Primary Care Provider: Caroline Florez MD        Subjective:     Principal Problem:Dehydration        HPI:  ED HPI: 88-year-old male with a history of stomach cancer presents the ER with abdominal pain has been going on for "a very long time" now associated with nausea and vomiting that has been going on for awhile as well.  Denies any fever.  No blood in stool or vomit.  Not ill appearing, talking in full sentences without issue.  No chest pain or shortness of breath.  Describes the pain as diffuse.  Was seen here a few weeks ago for same issue, full workup done including CT scan.  See results:     Impression:     1. Previous gastric surgery  2. New mild to moderate ascites around the liver and spleen and in the pericolic gutters and pelvis. There is some heterogeneity of the omentum anteriorly which could just be due to the fluid but cannot exclude malignant ascites or omental metastasis.  3. Diverticulosis but no diverticulitis  4. Cholecystectomy  5. 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  Patient has been evaluated in the clinic twice within the last month for urinary issues, weight loss, dehydration.  Patient was given 1 L of IV fluids during most recent clinic visit for dehydration, and had labs performed with Oncology later that afternoon.  Patient had a follow up with oncologist on 02/19/2024 and had a PET scan last week.  Patient's daughter who is present at bedside " reports they have an upcoming appointment next Tuesday for the PET scan results.  Per oncology note, he does not feel patient would be an appropriate candidate for systemic palliative chemotherapy due to advanced age.  Labs obtained in the emergency department reviewed.  Patient noted with a mild dehydration, and reports improvement in abdominal pain and weakness after administration of IV fluids.  Patient reports poor appetite, minimal oral intake, severe weakness with recurrent falls.  Patient admitted for rehydration with IVF, antiemetics, monitoring.    Overview/Hospital Course:  3/2 AA, weekend crosscover, patient history of gastric adenocarcinoma, history of partial gastrectomy, came in with abdominal pain nausea vomiting, dehydration, following with Oncology for gastric mass, planning for follow-up with oncologist to discuss results, blood pressure stable, complains of abdominal spasm and pain, pain meds adjusted  3/3 AA, weekend crosscover, no acute events overnight, patient did have small bowel movement with enema yesterday, patient wanting to repeat enema today, blood pressure stable, noted elevated , check B12 level, renal function continuing to improve, continue pain control, IV fluids, discharge planning  3/4 GT:  Patient assessed with wife and son at bedside this morning.  Patient is complaining of abdominal discomfort.  Patient is having difficulty with bowel movements.  Changes made to bowel regimen, adding long-acting pain medication to his regimen.  Patient reports he has made peace and is ready to die.   Hospice discussed with patient, spouse, and son at bedside.  All are in agreement and would like an informational meeting.  Case management consult entered.  Patient is supposed to have follow up with Oncology tomorrow for the results of his PET scan, and we have informed family that even if patient is unable to make this appointment, a telephone conference should be held to receive  definitive results.  3/5 GT:  Patient is lethargic this morning but arousable.  Patient had some hematemesis yesterday, Lovenox was discontinued, Protonix was increased to twice a day, Carafate was added to his medication regimen.  Patient's pain seems to be controlled with long-acting MS Contin that was added yesterday.  Patient did require supplemental oxygen overnight, and is still on nasal cannula this morning.  This morning nursing informed us that patient's son is refusing oral medications because he was fearful that the patient will choke.  Patient's long-acting medication is oral, not transitioning to higher doses of IV pain medication, and nursing was instructed to administer oral MS Contin.  We will keep patient 1 more day, informed son that eventually he needs to be discharged home with hospice.  3/6 GT:  Patient is currently in the process of transitioning.  Patient is tachycardic and is noted with elevated leukocytosis.  Patient had urinary retention yesterday requiring insertion of an indwelling Gray catheter.  Patient has minimal urine output noted in catheter bag this morning.  Patient is lethargic, mouth breathing, mildly tachypneic, minimally responsive.  [facility] is no longer willing to accept patient due to patient's sons post death plan.  Patient's spouse who is at bedside reports she would like patient's stay hospitalized for comfort care being that we expect him to  in the next 24-48 hours.  Patient's son is requesting patient be discharged so that he can begin the drive to [place] and have him pronounced there.  Patient reports he spoke to [facility] and he reports a informed him that they will go to a hotel room to see the patient, but son is unsure of the hotel which he is planning to take patient to.  Prognostically, we have informed both the son and the spouse that patient may not make the ride.  Case management updated.    Interval History: Patient seen and  examined.    Review of Systems   Unable to perform ROS: Acuity of condition     Objective:     Vital Signs (Most Recent):  Temp: 98.4 °F (36.9 °C) (03/06/24 0505)  Pulse: 108 (03/06/24 0750)  Resp: 16 (03/06/24 0750)  BP: (!) 90/58 (03/06/24 0520)  SpO2: (!) 93 % (03/06/24 0750) Vital Signs (24h Range):  Temp:  [97.1 °F (36.2 °C)-98.4 °F (36.9 °C)] 98.4 °F (36.9 °C)  Pulse:  [101-116] 108  Resp:  [16-24] 16  SpO2:  [92 %-95 %] 93 %  BP: ()/(58-73) 90/58     Weight: 58.3 kg (128 lb 8 oz)  Body mass index is 18.44 kg/m².    Intake/Output Summary (Last 24 hours) at 3/6/2024 0932  Last data filed at 3/6/2024 0600  Gross per 24 hour   Intake 0 ml   Output 300 ml   Net -300 ml           Physical Exam  Constitutional:       General: He is not in acute distress.     Appearance: He is cachectic. He is ill-appearing (Frail, chronically ill-appearing). He is not toxic-appearing.      Interventions: Nasal cannula in place.      Comments: Tobacco stains under fingernails   HENT:      Head: Normocephalic and atraumatic.      Right Ear: Decreased hearing noted.      Left Ear: Decreased hearing noted.      Nose: No congestion or rhinorrhea.      Mouth/Throat:      Pharynx: No oropharyngeal exudate.      Comments: Tobacco stains to dentures  Eyes:      General: No scleral icterus.  Neck:      Vascular: No carotid bruit.   Cardiovascular:      Rate and Rhythm: Normal rate and regular rhythm.      Pulses: Normal pulses.      Heart sounds: Normal heart sounds. No murmur heard.     No friction rub. No gallop.   Pulmonary:      Effort: Tachypnea and accessory muscle usage present. No respiratory distress.      Breath sounds: Normal breath sounds. No stridor. No wheezing, rhonchi or rales.      Comments: On supplemental oxygen, mouth breathing, mildly tachypneic  Chest:      Chest wall: No tenderness.   Abdominal:      General: There is distension.      Palpations: There is no mass.      Tenderness: There is no abdominal tenderness.  There is no right CVA tenderness, left CVA tenderness, guarding or rebound.      Hernia: No hernia is present.   Genitourinary:     Comments: Gray with minimal dark urine output  Musculoskeletal:         General: No swelling, tenderness or deformity.      Cervical back: No rigidity.      Right lower leg: No edema.      Left lower leg: No edema.   Lymphadenopathy:      Cervical: No cervical adenopathy.   Skin:     General: Skin is warm and dry.      Capillary Refill: Capillary refill takes less than 2 seconds.      Coloration: Skin is pale. Skin is not jaundiced.      Findings: Bruising present. No rash.   Neurological:      Mental Status: Mental status is at baseline. He is lethargic.      Motor: Weakness present.      Gait: Gait normal.             Significant Labs: All pertinent labs within the past 24 hours have been reviewed.  Recent Lab Results         03/06/24  0531   03/05/24  1149        Albumin 2.0                  ALT 11         Anion Gap 7         AST 24         Baso # 0.06         Basophil % 0.4         BILIRUBIN TOTAL 1.0  Comment: For infants and newborns, interpretation of results should be based  on gestational age, weight and in agreement with clinical  observations.    Premature Infant recommended reference ranges:  Up to 24 hours.............<8.0 mg/dL  Up to 48 hours............<12.0 mg/dL  3-5 days..................<15.0 mg/dL  6-29 days.................<15.0 mg/dL    For patients on Eltrombopag therapy, use of Dimension [place] TBIL is   not   recommended.           BUN 36         Calcium 10.2         Chloride 119         CO2 19         Creatinine 1.9         Differential Method Automated         eGFR 33.9         Eos # 0.0         Eos % 0.0         Glucose 107         Gran # (ANC) 14.7         Gran % 95.8         Hematocrit 45.2         Hemoglobin 14.6         Immature Grans (Abs) 0.13  Comment: Mild elevation in immature granulocytes is non specific and   can be seen in a variety of  conditions including stress response,   acute inflammation, trauma and pregnancy. Correlation with other   laboratory and clinical findings is essential.           Immature Granulocytes 0.8         Lymph # 0.2         Lymph % 1.4         Magnesium  2.6         MCH 34.0         MCHC 32.3                  Mono # 0.2         Mono % 1.6         MPV 9.9         nRBC 0         Platelet Estimate Clumped         Platelet Count 397         POCT Glucose   94       Potassium 4.9         PROTEIN TOTAL 6.9         RBC 4.30         RDW 13.8         Sodium 145         WBC 15.34                 Significant Imaging: I have reviewed all pertinent imaging results/findings within the past 24 hours.    Assessment/Plan:      * Dehydration  Patient noted with mild dehydration on labs.  Patient admitted for IV fluids, antiemetics.    3/4 GT: Continue with IV fluids at current rate.  3/5 GT:  IV fluids discontinued due to patient becoming overloaded last night.      Frequent falls  DC therapy services.  Patient is in the process of transitioning.      Hard of hearing  Noted.  Safety precautions.      Malignant neoplasm of body of stomach  S/p partial gastrectomy 07/12/2022.  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  Patient had follow up with oncologist on 02/19/2024 and had a PET-CT performed last week.  Patient has upcoming appointment on Tuesday to receive the results of the PET-CT.  Due to advanced age, patient will be a poor candidate for palliative systemic chemotherapy.    3/4/GT:  Case management consult for informational hospice meeting entered.  3/5 GT:  Family in agreement with hospice.  DME has been delivered to patient's house per report from son.  We will keep patient here 1 more day, and have informed son that he needs to be discharged home with hospice afterwards.  3/6 GT:  Patient is currently transitioning.  Expect patient to expiring and neck 24-48 hours.  There is issues  with the obtaining hospice care for patient due to the sons post death wishes.  Case management assisting.      HTN (hypertension), benign  Chronic, controlled. Latest blood pressure and vitals reviewed-     Temp:  [97.1 °F (36.2 °C)-98.4 °F (36.9 °C)]   Pulse:  [101-116]   Resp:  [16-24]   BP: ()/(58-73)   SpO2:  [92 %-95 %] .       VTE Risk Mitigation (From admission, onward)           Ordered     IP VTE HIGH RISK PATIENT  Once         03/01/24 1315     Place sequential compression device  Until discontinued         03/01/24 1315                    Discharge Planning   PHILLIP:      Code Status: DNR   Is the patient medically ready for discharge?:     Reason for patient still in hospital (select all that apply): Patient unstable, Patient trending condition, Treatment, Pending disposition, and Other (specify) issues with son's post death wishes  Discharge Plan A: Hospice/home   Discharge Delays: (!) Patient and Family Barriers (post death plans)              FITO GRANADOS  Department of Hospital Medicine   [facility]

## 2024-03-06 NOTE — PLAN OF CARE
Problem: Adult Inpatient Plan of Care  Goal: Optimal Comfort and Wellbeing  Outcome: Ongoing, Not Progressing  Goal: Readiness for Transition of Care  Outcome: Ongoing, Not Progressing     Problem: Infection  Goal: Absence of Infection Signs and Symptoms  Outcome: Ongoing, Not Progressing         Problem: Adult Inpatient Plan of Care  Goal: Plan of Care Review  Outcome: Ongoing, Progressing  Goal: Patient-Specific Goal (Individualized)  Outcome: Ongoing, Progressing  Goal: Absence of Hospital-Acquired Illness or Injury  Outcome: Ongoing, Progressing     Problem: Fall Injury Risk  Goal: Absence of Fall and Fall-Related Injury  Outcome: Ongoing, Progressing     Problem: Skin Injury Risk Increased  Goal: Skin Health and Integrity  Outcome: Ongoing, Progressing

## 2024-03-06 NOTE — NURSING
Bladder scan noted 406 mL urine in bladder. Notified Joaquina Smith NP. New orders given for morrow. 16Fr. Inserted without difficulties.

## 2024-03-06 NOTE — PLAN OF CARE
Spoke to Mrs. Arriaga this morning, and she confirmed that she is designating her son, Rayo Valente, to speak on her behalf. I spoke to Rayo about inpatient hospice care for the patient, and he is okay with someone coming out to see if the patient would meet for inpatient hospice. He also stated that they plan to look for a plot here in [place]. Dr. Florez and Joaquina were notified.

## 2024-03-07 NOTE — DISCHARGE SUMMARY
"[facility] Medicine  Discharge Summary      Patient Name: Rayo Duran  MRN: [id number]  CHLOÉ: [id number]  Patient Class: IP- Inpatient  Admission Date: 3/1/2024  Hospital Length of Stay: 4 days  Discharge Date and Time: 3/6/2024  5:17 PM  Attending Physician: No att. providers found   Discharging Provider: Caroline Florez MD  Primary Care Provider: Caroline Florez MD    Primary Care Team: Networked reference to record PCT     HPI:   ED HPI: 88-year-old male with a history of stomach cancer presents the ER with abdominal pain has been going on for "a very long time" now associated with nausea and vomiting that has been going on for awhile as well.  Denies any fever.  No blood in stool or vomit.  Not ill appearing, talking in full sentences without issue.  No chest pain or shortness of breath.  Describes the pain as diffuse.  Was seen here a few weeks ago for same issue, full workup done including CT scan.  See results:     Impression:     1. Previous gastric surgery  2. New mild to moderate ascites around the liver and spleen and in the pericolic gutters and pelvis. There is some heterogeneity of the omentum anteriorly which could just be due to the fluid but cannot exclude malignant ascites or omental metastasis.  3. Diverticulosis but no diverticulitis  4. Cholecystectomy  5. 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  Patient has been evaluated in the clinic twice within the last month for urinary issues, weight loss, dehydration.  Patient was given 1 L of IV fluids during most recent clinic visit for dehydration, and had labs performed with Oncology later that afternoon.  Patient " had a follow up with oncologist on 02/19/2024 and had a PET scan last week.  Patient's daughter who is present at bedside reports they have an upcoming appointment next Tuesday for the PET scan results.  Per oncology note, he does not feel patient would be an appropriate candidate for systemic palliative chemotherapy due to advanced age.  Labs obtained in the emergency department reviewed.  Patient noted with a mild dehydration, and reports improvement in abdominal pain and weakness after administration of IV fluids.  Patient reports poor appetite, minimal oral intake, severe weakness with recurrent falls.  Patient admitted for rehydration with IVF, antiemetics, monitoring.    * No surgery found *      Hospital Course:   3/2 AA, weekend crosscover, patient history of gastric adenocarcinoma, history of partial gastrectomy, came in with abdominal pain nausea vomiting, dehydration, following with Oncology for gastric mass, planning for follow-up with oncologist to discuss results, blood pressure stable, complains of abdominal spasm and pain, pain meds adjusted  3/3 AA, weekend crosscover, no acute events overnight, patient did have small bowel movement with enema yesterday, patient wanting to repeat enema today, blood pressure stable, noted elevated , check B12 level, renal function continuing to improve, continue pain control, IV fluids, discharge planning  3/4 GT:  Patient assessed with wife and son at bedside this morning.  Patient is complaining of abdominal discomfort.  Patient is having difficulty with bowel movements.  Changes made to bowel regimen, adding long-acting pain medication to his regimen.  Patient reports he has made peace and is ready to die.   Hospice discussed with patient, spouse, and son at bedside.  All are in agreement and would like an informational meeting.  Case management consult entered.  Patient is supposed to have follow up with Oncology tomorrow for the results of his PET scan,  and we have informed family that even if patient is unable to make this appointment, a telephone conference should be held to receive definitive results.  3/5 GT:  Patient is lethargic this morning but arousable.  Patient had some hematemesis yesterday, Lovenox was discontinued, Protonix was increased to twice a day, Carafate was added to his medication regimen.  Patient's pain seems to be controlled with long-acting MS Contin that was added yesterday.  Patient did require supplemental oxygen overnight, and is still on nasal cannula this morning.  This morning nursing informed us that patient's son is refusing oral medications because he was fearful that the patient will choke.  Patient's long-acting medication is oral, not transitioning to higher doses of IV pain medication, and nursing was instructed to administer oral MS Contin.  We will keep patient 1 more day, informed son that eventually he needs to be discharged home with hospice.  3/6 GT:  Patient is currently in the process of transitioning.  Patient is tachycardic and is noted with elevated leukocytosis.  Patient had urinary retention yesterday requiring insertion of an indwelling Gray catheter.  Patient has minimal urine output noted in catheter bag this morning.  Patient is lethargic, mouth breathing, mildly tachypneic, minimally responsive.  [facility] is no longer willing to accept patient due to patient's sons post death plan.  Patient's spouse who is at bedside reports she would like patient's stay hospitalized for comfort care being that we expect him to  in the next 24-48 hours.  Patient's son is requesting patient be discharged so that he can begin the drive to [place] and have him pronounced there.  Patient reports he spoke to [facility] hospice and he reports a informed him that they will go to a hotel room to see the patient, but son is unsure of the hotel which he is planning to take patient to.  Prognostically, we have informed both  the son and the spouse that patient may not make the ride.  Case management updated.    Pt admitted to inpt hospice this afternoon - will continue comfort care.       Goals of Care Treatment Preferences:  Code Status: DNR      Consults:   Consults (From admission, onward)          Status Ordering Provider     Inpatient consult to Social Work/Case Management  Once        Provider:  (Not yet assigned)    Acknowledged MERARI PEREZ            Oncology  Malignant neoplasm of body of stomach  S/p partial gastrectomy 07/12/2022.  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  Patient had follow up with oncologist on 02/19/2024 and had a PET-CT performed last week.  Patient has upcoming appointment on Tuesday to receive the results of the PET-CT.  Due to advanced age, patient will be a poor candidate for palliative systemic chemotherapy.    3/4/GT:  Case management consult for informational hospice meeting entered.  3/5 GT:  Family in agreement with hospice.  DME has been delivered to patient's house per report from son.  We will keep patient here 1 more day, and have informed son that he needs to be discharged home with hospice afterwards.  3/6 GT:  Patient is currently transitioning.  Expect patient to expiring and neck 24-48 hours.  There is issues with the obtaining hospice care for patient due to the sons post death wishes.  Case management assisting.    Pt actively declining - ashlee discharge to inpt hospice for continued comfort care      Final Active Diagnoses:    Diagnosis Date Noted POA    PRINCIPAL PROBLEM:  Dehydration [E86.0] 03/01/2024 Yes    Frequent falls [R29.6] 12/07/2023 Not Applicable    Hard of hearing [H91.90]  Yes    Malignant neoplasm of body of stomach [C16.2] 07/12/2022 Yes    HTN (hypertension), benign [I10] 05/30/2022 Yes      Problems Resolved During this Admission:       Discharged Condition: poor    Disposition: Hospice/Medical Facility    Follow  Up:    Patient Instructions:      Ambulatory referral/consult to Outpatient Case Management   Referral Priority: Routine Referral Type: Consultation   Referral Reason: Specialty Services Required   Requested Specialty: Internal Medicine   Number of Visits Requested: 1       Significant Diagnostic Studies: Labs: All labs within the past 24 hours have been reviewed    Pending Diagnostic Studies:       None           Medications:  Meds per hospice    Indwelling Lines/Drains at time of discharge:   Lines/Drains/Airways       Drain  Duration                  Urethral Catheter 03/05/24 1910 16 Fr. <1 day                    Time spent on the discharge of patient: 25 minutes         Caroline Florez MD  Department of Hospital Medicine  [facility]

## 2024-03-07 NOTE — PLAN OF CARE
Pt .  Problem: Adult Inpatient Plan of Care  Goal: Plan of Care Review  Outcome: Met  Goal: Patient-Specific Goal (Individualized)  Outcome: Met  Goal: Absence of Hospital-Acquired Illness or Injury  Outcome: Met  Goal: Optimal Comfort and Wellbeing  Outcome: Met  Goal: Readiness for Transition of Care  Outcome: Met     Problem: Infection  Goal: Absence of Infection Signs and Symptoms  Outcome: Met     Problem: Fall Injury Risk  Goal: Absence of Fall and Fall-Related Injury  Outcome: Met

## 2024-03-07 NOTE — ASSESSMENT & PLAN NOTE
S/p partial gastrectomy 07/12/2022.  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  Patient had follow up with oncologist on 02/19/2024 and had a PET-CT performed last week.  Patient has upcoming appointment on Tuesday to receive the results of the PET-CT.  Due to advanced age, patient will be a poor candidate for palliative systemic chemotherapy.    3/4/GT:  Case management consult for informational hospice meeting entered.  3/5 GT:  Family in agreement with hospice.  DME has been delivered to patient's house per report from son.  We will keep patient here 1 more day, and have informed son that he needs to be discharged home with hospice afterwards.  3/6 GT:  Patient is currently transitioning.  Expect patient to expiring and neck 24-48 hours.  There is issues with the obtaining hospice care for patient due to the sons post death wishes.  Case management assisting.    Pt actively declining - ashlee discharge to inpt hospice for continued comfort care

## 2024-03-07 NOTE — NURSING
Spoke to Joaquina Smith, NP with Dr. Florez office about pt., we are losing IV access, IV leaking and we were unsuccessful to get another IV on him, is there any way we can change the route of his Morphine, New order given for Morphine 4mg IM q 2 hours PRN for end of life/comfort care, read back order. Will continue to monitor.

## 2024-03-08 ENCOUNTER — OUTPATIENT CASE MANAGEMENT (OUTPATIENT)
Dept: ADMINISTRATIVE | Facility: OTHER | Age: 89
End: 2024-03-08
Payer: OTHER MISCELLANEOUS

## 2024-03-12 NOTE — SUBJECTIVE & OBJECTIVE
Past Medical History:   Diagnosis Date    Cancer     gastric    Dizziness     Encounter for blood transfusion     GI bleed     Hard of hearing     Hypertension     Insomnia     Personal history of fall     Stomach ulcer     Transfusion reaction     Tumor     removed from stomach       Past Surgical History:   Procedure Laterality Date    APPENDECTOMY      CHOLECYSTECTOMY      CLOSED REDUCTION OF INJURY OF HIP Left 02/09/2023    Procedure: CLOSED REDUCTION, HIP, LEFT;  Surgeon: Ck Collado MD;  Location: [facility] OR;  Service: Orthopedics;  Laterality: Left;    ESOPHAGOGASTRODUODENOSCOPY N/A 05/31/2022    Procedure: EGD (ESOPHAGOGASTRODUODENOSCOPY);  Surgeon: Mario Escalera MD;  Location: [facility] ENDO;  Service: Endoscopy;  Laterality: N/A;    GASTRECTOMY      GASTRECTOMY N/A 07/12/2022    Procedure: GASTRECTOMY;  Surgeon: Jordan Montesinos MD;  Location: [facility] OR;  Service: General;  Laterality: N/A;  RN PREOP 6/29/2022   T/S DAY OF SURGERY (LIVES 110 MILES AWAY)  HAS PCP CLEAR.    KNEE SURGERY      PARTIAL GASTRECTOMY      PERCUTANEOUS PINNING OF FRACTURE Left 02/09/2023    Procedure: PINNING, FRACTURE, PERCUTANEOUS;  Surgeon: Ck Collado MD;  Location: [facility] OR;  Service: Orthopedics;  Laterality: Left;    SHOULDER SURGERY Right        Review of patient's allergies indicates:   Allergen Reactions    Ativan [lorazepam] Anxiety     Patient had paradoxical effect after ativan IV and was so agitated he had to be restrained and transferred to ICU for precedex.       No current facility-administered medications on file prior to encounter.     Current Outpatient Medications on File Prior to Encounter   Medication Sig    diclofenac sodium (VOLTAREN) 1 % Gel Apply 2 g topically 4 (four) times daily as needed (pain). (Patient not taking: Reported on 2/19/2024)    HYDROcodone-acetaminophen (NORCO) 5-325 mg per tablet Take 1 tablet by mouth every 6 (six) hours as needed for Pain. (Patient not taking: Reported on 2/19/2024)     hydrOXYzine HCL (ATARAX) 25 MG tablet Take 1 tablet (25 mg total) by mouth 3 (three) times daily as needed for Anxiety. (Patient not taking: Reported on 2/19/2024)    ibuprofen (ADVIL,MOTRIN) 600 MG tablet Take 1 tablet (600 mg total) by mouth every 6 (six) hours as needed for Pain. (Patient not taking: Reported on 2/19/2024)    megestroL (MEGACE) 40 MG Tab Take 1 tablet (40 mg total) by mouth once daily. (Patient not taking: Reported on 2/19/2024.)    mupirocin (BACTROBAN) 2 % ointment Apply topically 2 (two) times daily. (Patient not taking: Reported on 2/19/2024)    ondansetron (ZOFRAN) 4 MG tablet Take 1 tablet (4 mg total) by mouth every 8 (eight) hours as needed for Nausea. (Patient not taking: Reported on 2/19/2024)    ondansetron (ZOFRAN) 4 MG tablet Take 1 tablet (4 mg total) by mouth every 6 (six) hours as needed. (Patient not taking: Reported on 2/19/2024)    ondansetron (ZOFRAN-ODT) 4 MG TbDL Take 1 tablet (4 mg total) by mouth 3 (three) times daily as needed (nausea). (Patient not taking: Reported on 2/19/2024)    pantoprazole (PROTONIX) 40 MG tablet Take 1 tablet (40 mg total) by mouth once daily. (Patient not taking: Reported on 2/1/2024)    tamsulosin (FLOMAX) 0.4 mg Cap Take 1 capsule (0.4 mg total) by mouth once daily. (Patient not taking: Reported on 2/19/2024)    triamcinolone acetonide 0.1% (KENALOG) 0.1 % cream Apply topically 2 (two) times daily. (Patient not taking: Reported on 2/19/2024)     Family History       Problem Relation (Age of Onset)    Hypertension Sister    No Known Problems Mother, Father, Brother, Maternal Grandmother, Maternal Grandfather, Paternal Grandmother, Paternal Grandfather    Uterine cancer Sister          Tobacco Use    Smoking status: Never    Smokeless tobacco: Current     Types: Chew   Substance and Sexual Activity    Alcohol use: Yes     Alcohol/week: 2.0 standard drinks of alcohol     Types: 2 Shots of liquor per week    Drug use: Never    Sexual activity: Not  Currently     Review of Systems   Unable to perform ROS: Patient unresponsive     Objective:     Vital Signs (Most Recent):  Temp: 98.8 °F (37.1 °C) (03/06/24 1952)  Pulse: (!) 117 (03/06/24 1952)  Resp: 20 (03/06/24 2341)  BP: 119/85 (03/06/24 1952)  SpO2: (!) 81 % (03/06/24 1952) Vital Signs (24h Range):           There is no height or weight on file to calculate BMI.     Physical Exam  Constitutional:       Appearance: He is ill-appearing.      Comments: Thin male   HENT:      Mouth/Throat:      Mouth: Mucous membranes are dry.   Eyes:      Pupils: Pupils are equal, round, and reactive to light.   Cardiovascular:      Rate and Rhythm: Normal rate and regular rhythm.   Pulmonary:      Effort: Pulmonary effort is normal.      Breath sounds: Normal breath sounds.   Abdominal:      General: There is no distension.      Palpations: Abdomen is soft.      Tenderness: There is no abdominal tenderness.   Skin:     General: Skin is warm.      Capillary Refill: Capillary refill takes 2 to 3 seconds.              CRANIAL NERVES     CN III, IV, VI   Pupils are equal, round, and reactive to light.       Significant Labs: All pertinent labs within the past 24 hours have been reviewed.  None    Significant Imaging: I have reviewed all pertinent imaging results/findings within the past 24 hours.

## 2024-03-12 NOTE — H&P
[facility] Medicine  History & Physical    Patient Name: Rayo Duran  MRN: [id number]  Patient Class: IP- Hospice  Admission Date: 3/6/2024  Attending Physician: No att. providers found   Primary Care Provider: Caroline Florez MD         Patient information was obtained from spouse/SO and past medical records.     Subjective:     Principal Problem:Malignant neoplasm of body of stomach    Chief Complaint:   Chief Complaint   Patient presents with    pallative care        HPI: 87 yo male with gastric cancer with worsening abd pain, anorexia.  Pt has been getting pain control.   Pt became more lethargic and family decided to go with pallative care and comfort measures/dnr status    Past Medical History:   Diagnosis Date    Cancer     gastric    Dizziness     Encounter for blood transfusion     GI bleed     Hard of hearing     Hypertension     Insomnia     Personal history of fall     Stomach ulcer     Transfusion reaction     Tumor     removed from stomach       Past Surgical History:   Procedure Laterality Date    APPENDECTOMY      CHOLECYSTECTOMY      CLOSED REDUCTION OF INJURY OF HIP Left 02/09/2023    Procedure: CLOSED REDUCTION, HIP, LEFT;  Surgeon: Ck Collado MD;  Location: [facility] OR;  Service: Orthopedics;  Laterality: Left;    ESOPHAGOGASTRODUODENOSCOPY N/A 05/31/2022    Procedure: EGD (ESOPHAGOGASTRODUODENOSCOPY);  Surgeon: Mario Escalera MD;  Location: [facility];  Service: Endoscopy;  Laterality: N/A;    GASTRECTOMY      GASTRECTOMY N/A 07/12/2022    Procedure: GASTRECTOMY;  Surgeon: Jordan Montesinos MD;  Location: [facility] OR;  Service: General;  Laterality: N/A;  RN PREOP 6/29/2022   T/S DAY OF SURGERY (LIVES 110 MILES AWAY)  HAS PCP CLEAR.    KNEE SURGERY      PARTIAL GASTRECTOMY      PERCUTANEOUS PINNING OF FRACTURE Left 02/09/2023    Procedure: PINNING, FRACTURE, PERCUTANEOUS;  Surgeon: Ck Collado MD;  Location: [facility] OR;  Service: Orthopedics;  Laterality: Left;    SHOULDER  SURGERY Right        Review of patient's allergies indicates:   Allergen Reactions    Ativan [lorazepam] Anxiety     Patient had paradoxical effect after ativan IV and was so agitated he had to be restrained and transferred to ICU for precedex.       No current facility-administered medications on file prior to encounter.     Current Outpatient Medications on File Prior to Encounter   Medication Sig    diclofenac sodium (VOLTAREN) 1 % Gel Apply 2 g topically 4 (four) times daily as needed (pain). (Patient not taking: Reported on 2/19/2024)    HYDROcodone-acetaminophen (NORCO) 5-325 mg per tablet Take 1 tablet by mouth every 6 (six) hours as needed for Pain. (Patient not taking: Reported on 2/19/2024)    hydrOXYzine HCL (ATARAX) 25 MG tablet Take 1 tablet (25 mg total) by mouth 3 (three) times daily as needed for Anxiety. (Patient not taking: Reported on 2/19/2024)    ibuprofen (ADVIL,MOTRIN) 600 MG tablet Take 1 tablet (600 mg total) by mouth every 6 (six) hours as needed for Pain. (Patient not taking: Reported on 2/19/2024)    megestroL (MEGACE) 40 MG Tab Take 1 tablet (40 mg total) by mouth once daily. (Patient not taking: Reported on 2/19/2024.)    mupirocin (BACTROBAN) 2 % ointment Apply topically 2 (two) times daily. (Patient not taking: Reported on 2/19/2024)    ondansetron (ZOFRAN) 4 MG tablet Take 1 tablet (4 mg total) by mouth every 8 (eight) hours as needed for Nausea. (Patient not taking: Reported on 2/19/2024)    ondansetron (ZOFRAN) 4 MG tablet Take 1 tablet (4 mg total) by mouth every 6 (six) hours as needed. (Patient not taking: Reported on 2/19/2024)    ondansetron (ZOFRAN-ODT) 4 MG TbDL Take 1 tablet (4 mg total) by mouth 3 (three) times daily as needed (nausea). (Patient not taking: Reported on 2/19/2024)    pantoprazole (PROTONIX) 40 MG tablet Take 1 tablet (40 mg total) by mouth once daily. (Patient not taking: Reported on 2/1/2024)    tamsulosin (FLOMAX) 0.4 mg Cap Take 1 capsule (0.4 mg total)  by mouth once daily. (Patient not taking: Reported on 2/19/2024)    triamcinolone acetonide 0.1% (KENALOG) 0.1 % cream Apply topically 2 (two) times daily. (Patient not taking: Reported on 2/19/2024)     Family History       Problem Relation (Age of Onset)    Hypertension Sister    No Known Problems Mother, Father, Brother, Maternal Grandmother, Maternal Grandfather, Paternal Grandmother, Paternal Grandfather    Uterine cancer Sister          Tobacco Use    Smoking status: Never    Smokeless tobacco: Current     Types: Chew   Substance and Sexual Activity    Alcohol use: Yes     Alcohol/week: 2.0 standard drinks of alcohol     Types: 2 Shots of liquor per week    Drug use: Never    Sexual activity: Not Currently     Review of Systems   Unable to perform ROS: Patient unresponsive     Objective:     Vital Signs (Most Recent):  Temp: 98.8 °F (37.1 °C) (03/06/24 1952)  Pulse: (!) 117 (03/06/24 1952)  Resp: 20 (03/06/24 2341)  BP: 119/85 (03/06/24 1952)  SpO2: (!) 81 % (03/06/24 1952) Vital Signs (24h Range):           There is no height or weight on file to calculate BMI.     Physical Exam  Constitutional:       Appearance: He is ill-appearing.      Comments: Thin male   HENT:      Mouth/Throat:      Mouth: Mucous membranes are dry.   Eyes:      Pupils: Pupils are equal, round, and reactive to light.   Cardiovascular:      Rate and Rhythm: Normal rate and regular rhythm.   Pulmonary:      Effort: Pulmonary effort is normal.      Breath sounds: Normal breath sounds.   Abdominal:      General: There is no distension.      Palpations: Abdomen is soft.      Tenderness: There is no abdominal tenderness.   Skin:     General: Skin is warm.      Capillary Refill: Capillary refill takes 2 to 3 seconds.              CRANIAL NERVES     CN III, IV, VI   Pupils are equal, round, and reactive to light.       Significant Labs: All pertinent labs within the past 24 hours have been reviewed.  None    Significant Imaging: I have reviewed  all pertinent imaging results/findings within the past 24 hours.  Assessment/Plan:     * Malignant neoplasm of body of stomach  Comfort care       Palliative care encounter  Inpt hospice care - comfort measures        VTE Risk Mitigation (From admission, onward)      None                            Caroline Florez MD  Department of Hospital Medicine  [facility] Surg

## 2024-03-12 NOTE — HPI
87 yo male with gastric cancer with worsening abd pain, anorexia.  Pt has been getting pain control.   Pt became more lethargic and family decided to go with pallative care and comfort measures/dnr status

## 2024-03-12 NOTE — DISCHARGE SUMMARY
[facility]  Hospital Medicine  Discharge Summary      Patient Name: Rayo Duran  MRN: [id number]  CHLOÉ: [id number]  Patient Class: IP- Hospice  Admission Date: 3/6/2024  Hospital Length of Stay: 1 days  Discharge Date and Time:    Attending Physician: No att. providers found   Discharging Provider: Caroline Florez MD  Primary Care Provider: Caroline Florez MD    Primary Care Team: Networked reference to record PCT     HPI:   89 yo male with gastric cancer with worsening abd pain, anorexia.  Pt has been getting pain control.   Pt became more lethargic and family decided to go with pallative care and comfort measures/dnr status    * No surgery found *      Hospital Course:   89 yo male with gastric cancer on pallative care.  Pt passed away peacefuly on 3/7/2024     Goals of Care Treatment Preferences:  Code Status: DNR      Consults:     Oncology  * Malignant neoplasm of body of stomach  Comfort care     Pt passed away peacefully on 3/7/2024      Final Active Diagnoses:    Diagnosis Date Noted POA    PRINCIPAL PROBLEM:  Malignant neoplasm of body of stomach [C16.2] 2022 Yes    Palliative care encounter [Z51.5] 2022 Not Applicable      Problems Resolved During this Admission:       Discharged Condition:     Disposition:     Follow Up:    Patient Instructions:   No discharge procedures on file.    Significant Diagnostic Studies: N/A    Pending Diagnostic Studies:       None           Medications:  None    Indwelling Lines/Drains at time of discharge:   Lines/Drains/Airways       None                   Time spent on the discharge of patient: 15 minutes         Caroline Florez MD  Department of Hospital Medicine  [facility]
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
## (undated) DEVICE — TIP YANKAUERS BULB NO VENT

## (undated) DEVICE — BINDER ABDOMINAL 9 30-45

## (undated) DEVICE — APPLIER LIGACLIP MED 11IN

## (undated) DEVICE — PACK ENDOSCOPY GENERAL

## (undated) DEVICE — GLOVE BIOGEL 7.5

## (undated) DEVICE — Device

## (undated) DEVICE — CANISTER SUCTION 2 LTR

## (undated) DEVICE — DRESSING ABSRBNT ISLAND 3.6X8

## (undated) DEVICE — RELOAD ECHELON FLEX GRN 60MM

## (undated) DEVICE — SEALANT VISTASEAL FIBRIN 10ML

## (undated) DEVICE — APPLICATOR CHLORAPREP ORN 26ML

## (undated) DEVICE — SOL 9P NACL IRR PIC IL

## (undated) DEVICE — RELOAD ECHELON FLEX BLU 60MM

## (undated) DEVICE — SYR 10CC LUER LOCK

## (undated) DEVICE — STAPLER INT PROX TX 30X3.5MM

## (undated) DEVICE — COVER OVERHEAD SURG LT BLUE

## (undated) DEVICE — BLANKET UPPER BODY 78.7X29.9IN

## (undated) DEVICE — STAPLER ECHELON FLEX GST 60MM

## (undated) DEVICE — SUT PDS PLUS 1 CTX 36IN

## (undated) DEVICE — NDL HYPO REG 25G X 1 1/2

## (undated) DEVICE — SUT PDS PLUS 3-0 SH 27IN

## (undated) DEVICE — SUT VICRYL PLUS 3-0 SH 18IN

## (undated) DEVICE — TRAY FOLEY 16FR INFECTION CONT

## (undated) DEVICE — CONTAINER SPECIMEN STRL 4OZ

## (undated) DEVICE — STAPLER SKIN PROXIMATE WIDE

## (undated) DEVICE — SPONGE LAP 18X18 PREWASHED

## (undated) DEVICE — POSITIONER HEAD DONUT 9IN FOAM

## (undated) DEVICE — TOWEL OR DISP STRL BLUE 4/PK

## (undated) DEVICE — CART STAPLE RELD 30X3.5 BLU

## (undated) DEVICE — ELECTRODE 6 FLAT BLD

## (undated) DEVICE — ELECTRODE REM PLYHSV RETURN 9

## (undated) DEVICE — SHEARS HARMONIC 20CM HD 1000I